# Patient Record
Sex: MALE | Race: WHITE | NOT HISPANIC OR LATINO
[De-identification: names, ages, dates, MRNs, and addresses within clinical notes are randomized per-mention and may not be internally consistent; named-entity substitution may affect disease eponyms.]

---

## 2018-02-27 ENCOUNTER — APPOINTMENT (OUTPATIENT)
Dept: PULMONOLOGY | Facility: CLINIC | Age: 23
End: 2018-02-27
Payer: COMMERCIAL

## 2018-02-27 VITALS
HEART RATE: 66 BPM | HEIGHT: 69 IN | SYSTOLIC BLOOD PRESSURE: 124 MMHG | DIASTOLIC BLOOD PRESSURE: 80 MMHG | OXYGEN SATURATION: 98 % | BODY MASS INDEX: 42.51 KG/M2 | WEIGHT: 287 LBS

## 2018-02-27 DIAGNOSIS — F41.9 ANXIETY DISORDER, UNSPECIFIED: ICD-10-CM

## 2018-02-27 DIAGNOSIS — Z82.61 FAMILY HISTORY OF ARTHRITIS: ICD-10-CM

## 2018-02-27 DIAGNOSIS — Z83.3 FAMILY HISTORY OF DIABETES MELLITUS: ICD-10-CM

## 2018-02-27 DIAGNOSIS — Z86.59 PERSONAL HISTORY OF OTHER MENTAL AND BEHAVIORAL DISORDERS: ICD-10-CM

## 2018-02-27 DIAGNOSIS — Z78.9 OTHER SPECIFIED HEALTH STATUS: ICD-10-CM

## 2018-02-27 DIAGNOSIS — Z83.49 FAMILY HISTORY OF OTHER ENDOCRINE, NUTRITIONAL AND METABOLIC DISEASES: ICD-10-CM

## 2018-02-27 DIAGNOSIS — Z82.49 FAMILY HISTORY OF ISCHEMIC HEART DISEASE AND OTHER DISEASES OF THE CIRCULATORY SYSTEM: ICD-10-CM

## 2018-02-27 DIAGNOSIS — Z86.14 PERSONAL HISTORY OF METHICILLIN RESISTANT STAPHYLOCOCCUS AUREUS INFECTION: ICD-10-CM

## 2018-02-27 PROCEDURE — 99204 OFFICE O/P NEW MOD 45 MIN: CPT | Mod: 25

## 2018-02-27 PROCEDURE — 94010 BREATHING CAPACITY TEST: CPT

## 2018-02-27 PROCEDURE — 71046 X-RAY EXAM CHEST 2 VIEWS: CPT

## 2018-02-27 RX ORDER — ESCITALOPRAM OXALATE 20 MG/1
20 TABLET, FILM COATED ORAL
Refills: 0 | Status: ACTIVE | COMMUNITY

## 2018-05-01 ENCOUNTER — APPOINTMENT (OUTPATIENT)
Dept: PULMONOLOGY | Facility: CLINIC | Age: 23
End: 2018-05-01
Payer: COMMERCIAL

## 2018-05-01 VITALS
SYSTOLIC BLOOD PRESSURE: 124 MMHG | HEIGHT: 69 IN | WEIGHT: 290 LBS | OXYGEN SATURATION: 98 % | DIASTOLIC BLOOD PRESSURE: 80 MMHG | HEART RATE: 60 BPM | BODY MASS INDEX: 42.95 KG/M2

## 2018-05-01 PROCEDURE — 99214 OFFICE O/P EST MOD 30 MIN: CPT | Mod: 25

## 2018-05-01 PROCEDURE — 94010 BREATHING CAPACITY TEST: CPT

## 2018-08-28 ENCOUNTER — APPOINTMENT (OUTPATIENT)
Dept: PULMONOLOGY | Facility: CLINIC | Age: 23
End: 2018-08-28
Payer: COMMERCIAL

## 2018-08-28 ENCOUNTER — NON-APPOINTMENT (OUTPATIENT)
Age: 23
End: 2018-08-28

## 2018-08-28 VITALS
OXYGEN SATURATION: 98 % | SYSTOLIC BLOOD PRESSURE: 130 MMHG | WEIGHT: 300 LBS | HEART RATE: 85 BPM | HEIGHT: 69 IN | DIASTOLIC BLOOD PRESSURE: 88 MMHG | RESPIRATION RATE: 14 BRPM | BODY MASS INDEX: 44.43 KG/M2

## 2018-08-28 PROCEDURE — 99214 OFFICE O/P EST MOD 30 MIN: CPT | Mod: 25

## 2018-08-28 PROCEDURE — 94010 BREATHING CAPACITY TEST: CPT

## 2018-09-19 ENCOUNTER — RX RENEWAL (OUTPATIENT)
Age: 23
End: 2018-09-19

## 2018-10-15 ENCOUNTER — MEDICATION RENEWAL (OUTPATIENT)
Age: 23
End: 2018-10-15

## 2018-11-03 ENCOUNTER — RX RENEWAL (OUTPATIENT)
Age: 23
End: 2018-11-03

## 2018-11-05 ENCOUNTER — APPOINTMENT (OUTPATIENT)
Dept: FAMILY MEDICINE | Facility: CLINIC | Age: 23
End: 2018-11-05
Payer: COMMERCIAL

## 2018-11-05 ENCOUNTER — TRANSCRIPTION ENCOUNTER (OUTPATIENT)
Age: 23
End: 2018-11-05

## 2018-11-05 VITALS
HEIGHT: 69 IN | BODY MASS INDEX: 46.06 KG/M2 | DIASTOLIC BLOOD PRESSURE: 80 MMHG | RESPIRATION RATE: 16 BRPM | HEART RATE: 84 BPM | OXYGEN SATURATION: 98 % | SYSTOLIC BLOOD PRESSURE: 130 MMHG | WEIGHT: 311 LBS

## 2018-11-05 DIAGNOSIS — F95.2 TOURETTE'S DISORDER: ICD-10-CM

## 2018-11-05 PROCEDURE — 36415 COLL VENOUS BLD VENIPUNCTURE: CPT

## 2018-11-05 PROCEDURE — 99385 PREV VISIT NEW AGE 18-39: CPT | Mod: 25

## 2018-11-05 RX ORDER — MODAFINIL 200 MG/1
200 TABLET ORAL
Qty: 30 | Refills: 5 | Status: DISCONTINUED | COMMUNITY
Start: 2018-05-01 | End: 2018-11-05

## 2018-11-06 ENCOUNTER — APPOINTMENT (OUTPATIENT)
Dept: PULMONOLOGY | Facility: CLINIC | Age: 23
End: 2018-11-06
Payer: COMMERCIAL

## 2018-11-06 ENCOUNTER — NON-APPOINTMENT (OUTPATIENT)
Age: 23
End: 2018-11-06

## 2018-11-06 VITALS
SYSTOLIC BLOOD PRESSURE: 120 MMHG | WEIGHT: 304 LBS | DIASTOLIC BLOOD PRESSURE: 83 MMHG | BODY MASS INDEX: 43.52 KG/M2 | OXYGEN SATURATION: 98 % | RESPIRATION RATE: 17 BRPM | HEART RATE: 86 BPM | HEIGHT: 70 IN

## 2018-11-06 PROCEDURE — 99214 OFFICE O/P EST MOD 30 MIN: CPT | Mod: 25

## 2018-11-06 PROCEDURE — 94010 BREATHING CAPACITY TEST: CPT

## 2018-11-06 PROCEDURE — 71046 X-RAY EXAM CHEST 2 VIEWS: CPT

## 2018-11-06 NOTE — REASON FOR VISIT
[Follow-Up] : a follow-up visit [FreeTextEntry1] : abnormal PFTs, low testosterone STEPHANIE on CPAP, overweight, poor sleep, RLS and SOB

## 2018-11-06 NOTE — HISTORY OF PRESENT ILLNESS
[FreeTextEntry1] : Mr. Mathur is a 23 year old male with a history of abnormal PFTs, low testosterone STEPHANIE on CPAP, overweight, poor sleep, RLS and SOB presenting to the office today for a follow up visit. His chief complaint is poor sleep\par - He has been experimenting with his Modinafil. Since it would last too long, he has been waking up at 5 AM, forcing himself to take 200 mg, and going back to sleep. He notes that this has been helping. \par - He has been getting to sleep easier since taking Melatonin, but he has more difficulty remaining asleep. \par - he is up for at least once an hour. \par - Most of the time, he wakes up from rolling over \par - He has been SOB. \par - He has been having reflux \par - Coughing. He has been coughing leading to emesis. \par - He has a frequent feeling of a lump in his throat he needs to clear.\par - He has been feeling congested recently. \par - He denies any chest pressure, chest pain, wheezing

## 2018-11-06 NOTE — PROCEDURE
[FreeTextEntry1] : CXR revealed a normal sized heart; there was no evidence of infiltrate or effusion-- A normal chest radiograph. \par \par PFT- spi reveals mild restrictive dysfunction; FEV1 was 3.61L which is 78% of predicted; normal flow volume loop

## 2018-11-06 NOTE — ADDENDUM
[FreeTextEntry1] : Documented by Oracio Haile acting as a scribe for Dr. Brant Marin on 11/6/18\par \par All medical record entries made by the Scribe were at my, Dr. Brant Marin's, direction and personally dictated by me on 11/6/18. I have reviewed the chart and agree that the record accurately reflects my personal performance of the history, physical exam, assessment and plan. I have also personally directed, reviewed, and agree with the discharge instructions. \par \par \par \par \par

## 2018-11-06 NOTE — ASSESSMENT
[FreeTextEntry1] : Mr. Mathur is a 23 year old male with a history of SOB, GERD, poor sleep, Tourette's syndrome, ADD, sleep apnea, ?RLS, insomnia, obesity, OCD, and anxiety presenting to the office for an initial pulmonary re-evaluation.\par \par His shortness of breath is multifactorial due to:\par -obesity/out of shape\par -poor breathing mechanics\par Less likely\par -?CAD\par -?asthma\par \par problem 1: STEPHANIE\par -continue use of CPAP at pressure of 10, using religiously, tolerating it well, and seeing benefits. \par -continue of Modafinil 200 mg QAM \par -Sleep apnea is associated with adverse clinical consequences which an affect most organ systems.  Cardiovascular disease risk includes arrhythmias, atrial fibrillation, hypertension, coronary artery disease, and stroke. Metabolic disorders include diabetes type 2, non-alcoholic fatty liver disease. Mood disorder especially depression; and cognitive decline especially in the elderly. Associations with  chronic reflux/Leahy’s esophagus some but not all inclusive. \par -Reasons  include arousal consistent with hypopnea; respiratory events most prominent in REM sleep or supine position; therefore sleep staging and body position are important for accurate diagnosis and estimation of AHI. \par \par problem 2: insomnia/poor sleep\par -recommended to use Sleep Guard\par -recommended to use sunglasses 30 minutes before bedtime and to try room darkening window shades\par -Good sleep hygiene was encouraged including avoiding watching television an hour before bed, keeping caffeine at a low,  avoiding reading, television, or anything, in bed, no drinking any liquids three hours before bedtime, and only getting into bed when tired and ready for sleep. \par \par problem 3: RLS\par - He is s/p blood work, which revealed: ferratin level (normal), iron binding level (normal), testosterone level (low), TFT (normal) and vitamin D level (normal)\par - Continue Requip 1.0 mg QHS \par -Restless Legs Syndrome (RLS), also known as Stewart-Ekbom Disease, is a common sleep -related movement disorder. About 1 in 10 adults in the [place] have problems from restless leg syndrome. It also can be seen in about 2% of children. Women are twice as likely as men to have RLS. People with RLS will have symptoms  most often during times when they are less active, especially at bedtime. RLS most often causes an overwhelming urge to move your legs and sometimes other parts of your body. This urge is associated with unpleasant sensations in different parts of th body. The symptoms can be mild to severe and can affect your ability to go to sleep and stay asleep. People with RLS often sleep less at night and feel more tired during the day. \par \par problem 4: abnormal PFTs- ?Asthma \par -confirms sleep apnea\par - He is being given a script for an MCT to r/o for asthma\par - Add Breo 200 1 inhalation QD\par \par problem 5: residual fatigue\par -continue Modafinil 200 mg QAM (5AM)\par \par Problem 6: GERD\par - Continue Prilosec before breakfast\par - Add Zantac 300 mg QHS\par - He is scheduled to have an endoscopy performed at [facility] \par \par problem 7: low testosterone\par -recommended supplemental Boron 6mg QD\par -Referred to endocrinologist for further evaluation \par \par problem 8: poor breathing mechanics\par -Proper breathing techniques were reviewed with an emphasis of exhalation. Patient instructed to breath in for 1 second and out for four seconds. Patient was encouraged to not talk while walking. \par \par problem 9: obesity/out of shape\par -recommended to increase protein and decrease carbohydrates\par -recommended to reduce caffeine and increase water intake\par -Weight loss, exercise, and diet control were discussed and are highly encouraged. Treatment options were given such as, aqua therapy, and contacting a nutritionist. Recommended to use the elliptical, stationary bike, refrain from use of treadmill. Mindful eating was explained to the patient. Obesity is associated with worsening asthma, shortness of breath, and potential for cardiac disease, diabetes, and other underlying medical conditions.\par \par problem 10: health maintenance \par - He received an influenza vaccination 2018\par -recommended strep pneumonia vaccines: Prevnar-13 vaccine, followed by Pneumo vaccine 23 one year following\par -recommended early intervention for URIs\par -recommended regular osteoporosis evaluations\par -recommended early dermatological evaluations\par -recommended after the age of 50 to the age of 70, colonoscopy every 5 years \par  \par \par Follow up in 4 months \par Patient is encouraged to call with any changes, concerns or questions.

## 2018-11-07 LAB
ALBUMIN SERPL ELPH-MCNC: 4.3 G/DL
ALP BLD-CCNC: 81 U/L
ALT SERPL-CCNC: 60 U/L
ANION GAP SERPL CALC-SCNC: 13 MMOL/L
AST SERPL-CCNC: 26 U/L
BASOPHILS # BLD AUTO: 0.02 K/UL
BASOPHILS NFR BLD AUTO: 0.3 %
BILIRUB SERPL-MCNC: 0.5 MG/DL
BUN SERPL-MCNC: 6 MG/DL
CALCIUM SERPL-MCNC: 9.4 MG/DL
CHLORIDE SERPL-SCNC: 103 MMOL/L
CHOLEST SERPL-MCNC: 150 MG/DL
CHOLEST/HDLC SERPL: 3.9 RATIO
CO2 SERPL-SCNC: 24 MMOL/L
CREAT SERPL-MCNC: 0.6 MG/DL
EOSINOPHIL # BLD AUTO: 0.09 K/UL
EOSINOPHIL NFR BLD AUTO: 1.1 %
GLUCOSE SERPL-MCNC: 90 MG/DL
HBA1C MFR BLD HPLC: 5.3 %
HCT VFR BLD CALC: 48.6 %
HDLC SERPL-MCNC: 38 MG/DL
HGB BLD-MCNC: 14.8 G/DL
IMM GRANULOCYTES NFR BLD AUTO: 0.1 %
LDLC SERPL CALC-MCNC: 84 MG/DL
LYMPHOCYTES # BLD AUTO: 2.47 K/UL
LYMPHOCYTES NFR BLD AUTO: 31 %
MAN DIFF?: NORMAL
MCHC RBC-ENTMCNC: 28.5 PG
MCHC RBC-ENTMCNC: 30.5 GM/DL
MCV RBC AUTO: 93.5 FL
MONOCYTES # BLD AUTO: 0.78 K/UL
MONOCYTES NFR BLD AUTO: 9.8 %
NEUTROPHILS # BLD AUTO: 4.59 K/UL
NEUTROPHILS NFR BLD AUTO: 57.7 %
PLATELET # BLD AUTO: 245 K/UL
POTASSIUM SERPL-SCNC: 4.2 MMOL/L
PROT SERPL-MCNC: 7 G/DL
RBC # BLD: 5.2 M/UL
RBC # FLD: 13.4 %
SODIUM SERPL-SCNC: 140 MMOL/L
TRIGL SERPL-MCNC: 140 MG/DL
TSH SERPL-ACNC: 6.5 UIU/ML
WBC # FLD AUTO: 7.96 K/UL

## 2018-11-19 ENCOUNTER — MEDICATION RENEWAL (OUTPATIENT)
Age: 23
End: 2018-11-19

## 2018-11-28 ENCOUNTER — APPOINTMENT (OUTPATIENT)
Dept: PULMONOLOGY | Facility: CLINIC | Age: 23
End: 2018-11-28
Payer: COMMERCIAL

## 2018-11-28 PROCEDURE — 94070 EVALUATION OF WHEEZING: CPT

## 2018-11-28 PROCEDURE — 95070 INHLJ BRNCL CHALLENGE TSTG: CPT

## 2019-01-03 ENCOUNTER — NON-APPOINTMENT (OUTPATIENT)
Age: 24
End: 2019-01-03

## 2019-01-03 ENCOUNTER — APPOINTMENT (OUTPATIENT)
Dept: PULMONOLOGY | Facility: CLINIC | Age: 24
End: 2019-01-03
Payer: COMMERCIAL

## 2019-01-03 VITALS
HEIGHT: 71 IN | RESPIRATION RATE: 14 BRPM | HEART RATE: 77 BPM | SYSTOLIC BLOOD PRESSURE: 123 MMHG | BODY MASS INDEX: 43.12 KG/M2 | WEIGHT: 308 LBS | OXYGEN SATURATION: 97 % | DIASTOLIC BLOOD PRESSURE: 72 MMHG

## 2019-01-03 PROCEDURE — 94010 BREATHING CAPACITY TEST: CPT

## 2019-01-03 PROCEDURE — 99214 OFFICE O/P EST MOD 30 MIN: CPT | Mod: 25

## 2019-01-03 NOTE — REASON FOR VISIT
[Follow-Up] : a follow-up visit [FreeTextEntry1] : abnormal PFTs, GERD, low testosterone, STEPHANIE on CPAP, overweight, poor sleep, RLS, and SOB

## 2019-01-03 NOTE — HISTORY OF PRESENT ILLNESS
[FreeTextEntry1] : Mr. Mathur is a 23 year old male with a history of abnormal PFTs, GERD, low testosterone, STEPHANIE on CPAP, overweight, poor sleep, RLS, and SOB presenting to the office today for a follow up visit. His chief complaint is difficulty breathing / nasal congestion. \par -he states that he has been sick with a URI. he has been given doxycycline, promethazine and prednisone. \par -he reports that he has still been breathing heavily, and his sinuses are very congested \par -he states that he has had difficulty sleeping as well\par -he denies any headaches, nausea, vomiting, fever, chills, sweats, chest pain, chest pressure, diarrhea, constipation, dysphagia, dizziness, leg swelling, leg pain, itchy eyes, itchy ears, heartburn, reflux, or sour taste in the mouth.

## 2019-01-03 NOTE — PHYSICAL EXAM

## 2019-01-03 NOTE — ADDENDUM
[FreeTextEntry1] : All medical record entries made by tomas Ashford were at Dr. Brant Marin's, direction and personally dictated by me on 01/03/2019. I have reviewed the chart and agree that the record accurately reflects my personal performance of the history, physical exam, assessment and plan. I have also personally directed, reviewed, and agree with the discharge instructions.

## 2019-01-03 NOTE — ASSESSMENT
[FreeTextEntry1] : Mr. Mathur is a 23 year old male with a history of SOB, GERD, poor sleep, Tourette's syndrome, ADD, sleep apnea, ?RLS, insomnia, obesity, OCD, and anxiety presenting to the office for an initial pulmonary re-evaluation.\par \par His shortness of breath is multifactorial due to:\par -obesity/out of shape\par -poor breathing mechanics\par Less likely\par -?CAD\par -?asthma\par \par problem 1: STEPHANIE\par -continue use of CPAP at pressure of 10, using religiously, tolerating it well, and seeing benefits. \par -continue of Modafinil 200 mg QAM \par -Sleep apnea is associated with adverse clinical consequences which an affect most organ systems.  Cardiovascular disease risk includes arrhythmias, atrial fibrillation, hypertension, coronary artery disease, and stroke. Metabolic disorders include diabetes type 2, non-alcoholic fatty liver disease. Mood disorder especially depression; and cognitive decline especially in the elderly. Associations with  chronic reflux/Leahy’s esophagus some but not all inclusive. \par -Reasons  include arousal consistent with hypopnea; respiratory events most prominent in REM sleep or supine position; therefore sleep staging and body position are important for accurate diagnosis and estimation of AHI. \par \par problem 2: insomnia/poor sleep\par -recommended to use Sleep Guard\par -recommended to use sunglasses 30 minutes before bedtime and to try room darkening window shades\par -Good sleep hygiene was encouraged including avoiding watching television an hour before bed, keeping caffeine at a low,  avoiding reading, television, or anything, in bed, no drinking any liquids three hours before bedtime, and only getting into bed when tired and ready for sleep. \par \par problem 3: RLS\par - He is s/p blood work, which revealed: ferratin level (normal), iron binding level (normal), testosterone level (low), TFT (normal) and vitamin D level (normal)\par - Continue Requip 1.0 mg QHS \par -Restless Legs Syndrome (RLS), also known as Stewart-Ekbom Disease, is a common sleep -related movement disorder. About 1 in 10 adults in the [place] have problems from restless leg syndrome. It also can be seen in about 2% of children. Women are twice as likely as men to have RLS. People with RLS will have symptoms  most often during times when they are less active, especially at bedtime. RLS most often causes an overwhelming urge to move your legs and sometimes other parts of your body. This urge is associated with unpleasant sensations in different parts of th body. The symptoms can be mild to severe and can affect your ability to go to sleep and stay asleep. People with RLS often sleep less at night and feel more tired during the day. \par \par problem 4: abnormal PFTs-  c/w Asthma \par -MCT c/w asthma\par -confirms sleep apnea\par -continue Breo 200 1 inhalation QD\par -add Ventolin 2 puffs Q6H \par -add Singulair 10 mg QHS \par \par problem 5: allergy sinus \par -add Olopatadine 0.6% 1 sniff BID \par Environmental measures for allergies were encouraged including mattress and pillow cover, air purifier, and environmental controls \par \par problem 6: residual fatigue\par -continue Modafinil 200 mg QAM (5AM)\par \par Problem 7: GERD\par - Continue Prilosec before breakfast\par - continue Zantac 300 mg QHS\par - He is scheduled to have an endoscopy performed at [facility] \par \par Things to avoid including overeating, spicy foods, tight clothing, eating within three hours of bed, this list is not all inclusive. \par -Rule of 2s: avoid eating too much, eating too late, eating too spicy, eating two hours before bed\par -For treatment of reflux, possible options discussed including diet control, H2 blockers, PPIs, as well as coating motility agents discussed as treatment options. Timing of meals and proximity of last meal to sleep were discussed. If symptoms persist, a formal gastrointestinal evaluation is needed. \par \par problem 8: low testosterone\par -recommended supplemental Boron 6mg QD\par -Referred to endocrinologist for further evaluation \par \par problem 9: poor breathing mechanics\par -Proper breathing techniques were reviewed with an emphasis of exhalation. Patient instructed to breath in for 1 second and out for four seconds. Patient was encouraged to not talk while walking. \par \par problem 10: obesity/out of shape\par -recommended to increase protein and decrease carbohydrates\par -recommended to reduce caffeine and increase water intake\par -Weight loss, exercise, and diet control were discussed and are highly encouraged. Treatment options were given such as, aqua therapy, and contacting a nutritionist. Recommended to use the elliptical, stationary bike, refrain from use of treadmill. Mindful eating was explained to the patient. Obesity is associated with worsening asthma, shortness of breath, and potential for cardiac disease, diabetes, and other underlying medical conditions.\par \par problem 11: health maintenance \par - He received an influenza vaccination 2018\par -recommended strep pneumonia vaccines: Prevnar-13 vaccine, followed by Pneumo vaccine 23 one year following\par -recommended early intervention for URIs\par -recommended regular osteoporosis evaluations\par -recommended early dermatological evaluations\par -recommended after the age of 50 to the age of 70, colonoscopy every 5 years \par  \par \par Follow up in 4 months \par Patient is encouraged to call with any changes, concerns or questions.

## 2019-01-03 NOTE — PROCEDURE
[FreeTextEntry1] : PFT - spi reveals mild obstructive dysfunction; FEV1 is 3.76 which is 80% of predicted, normal flow volume loop

## 2019-01-07 ENCOUNTER — RX RENEWAL (OUTPATIENT)
Age: 24
End: 2019-01-07

## 2019-03-21 ENCOUNTER — RX RENEWAL (OUTPATIENT)
Age: 24
End: 2019-03-21

## 2019-03-21 ENCOUNTER — TRANSCRIPTION ENCOUNTER (OUTPATIENT)
Age: 24
End: 2019-03-21

## 2019-04-10 ENCOUNTER — APPOINTMENT (OUTPATIENT)
Dept: PULMONOLOGY | Facility: CLINIC | Age: 24
End: 2019-04-10
Payer: COMMERCIAL

## 2019-04-10 ENCOUNTER — NON-APPOINTMENT (OUTPATIENT)
Age: 24
End: 2019-04-10

## 2019-04-10 VITALS
DIASTOLIC BLOOD PRESSURE: 70 MMHG | HEART RATE: 92 BPM | BODY MASS INDEX: 46.65 KG/M2 | RESPIRATION RATE: 16 BRPM | WEIGHT: 315 LBS | SYSTOLIC BLOOD PRESSURE: 110 MMHG | OXYGEN SATURATION: 97 % | HEIGHT: 69 IN

## 2019-04-10 PROCEDURE — 99214 OFFICE O/P EST MOD 30 MIN: CPT | Mod: 25

## 2019-04-10 PROCEDURE — 94010 BREATHING CAPACITY TEST: CPT

## 2019-04-10 NOTE — ADDENDUM
[FreeTextEntry1] : Documented by Trang Hammonds acting as a scribe for Dr. Brant Marin on 4/10/2019.\par \par All medical record entries made by the scribe, Trang Hammonds, were at my, Dr. Brant Marin's, direction and personally dictated by me on 4/10/2019. I have reviewed the chart and agree that the record accurately reflects my personal performance of the history, physical exam, assessment and plan. I have also personally directed, reviewed, and agree with the discharge instructions.

## 2019-04-10 NOTE — ASSESSMENT
[FreeTextEntry1] : Mr. Mathur is a 24 year old male with a history of SOB, GERD, poor sleep, Tourette's syndrome, ADD, sleep apnea, ?RLS, insomnia, obesity, OCD, and anxiety presenting to the office for an initial pulmonary re-evaluation - residual fatigue. \par \par His shortness of breath is multifactorial due to:\par -obesity/out of shape\par -poor breathing mechanics\par Less likely\par -?CAD\par -?asthma\par \par problem 1: STEPHANIE\par -continue use of CPAP at pressure of 10, using religiously, tolerating it well, and seeing benefits. \par -continue of Modafinil 200 mg QAM \par -Sleep apnea is associated with adverse clinical consequences which an affect most organ systems.  Cardiovascular disease risk includes arrhythmias, atrial fibrillation, hypertension, coronary artery disease, and stroke. Metabolic disorders include diabetes type 2, non-alcoholic fatty liver disease. Mood disorder especially depression; and cognitive decline especially in the elderly. Associations with  chronic reflux/Leahy’s esophagus some but not all inclusive. \par -Reasons  include arousal consistent with hypopnea; respiratory events most prominent in REM sleep or supine position; therefore sleep staging and body position are important for accurate diagnosis and estimation of AHI. \par \par problem 2: insomnia/poor sleep\par -recommended to use Sleep Guard\par -recommended to use sunglasses 30 minutes before bedtime and to try room darkening window shades\par -Good sleep hygiene was encouraged including avoiding watching television an hour before bed, keeping caffeine at a low,  avoiding reading, television, or anything, in bed, no drinking any liquids three hours before bedtime, and only getting into bed when tired and ready for sleep. \par \par problem 3: RLS\par - He is s/p blood work, which revealed: ferratin level (normal), iron binding level (normal), testosterone level (low), TFT (normal) and vitamin D level (normal)\par - Continue Requip 2.0 mg QHS \par -Restless Legs Syndrome (RLS), also known as Stewart-Ekbom Disease, is a common sleep -related movement disorder. About 1 in 10 adults in the [place] have problems from restless leg syndrome. It also can be seen in about 2% of children. Women are twice as likely as men to have RLS. People with RLS will have symptoms  most often during times when they are less active, especially at bedtime. RLS most often causes an overwhelming urge to move your legs and sometimes other parts of your body. This urge is associated with unpleasant sensations in different parts of th body. The symptoms can be mild to severe and can affect your ability to go to sleep and stay asleep. People with RLS often sleep less at night and feel more tired during the day. \par \par problem 4: abnormal PFTs-  c/w Asthma \par -MCT c/w asthma\par -confirms sleep apnea - inspiratory limb flattened\par -continue Breo 200 1 inhalation QD\par -add Ventolin 2 puffs Q6H \par -add Singulair 10 mg QHS \par \par problem 5: allergy sinus \par -add Olopatadine 0.6% 1 sniff BID \par Environmental measures for allergies were encouraged including mattress and pillow cover, air purifier, and environmental controls \par \par problem 6: residual fatigue\par -continue Modafinil 200 mg QAM (5AM) - I-STOP reviewed\par \par Problem 7: GERD\par - Continue Prilosec before breakfast\par - continue Zantac 300 mg QHS\par - Marmaduke Feldman - c/w mild GERD/HH \par \par Things to avoid including overeating, spicy foods, tight clothing, eating within three hours of bed, this list is not all inclusive. \par -Rule of 2s: avoid eating too much, eating too late, eating too spicy, eating two hours before bed\par -For treatment of reflux, possible options discussed including diet control, H2 blockers, PPIs, as well as coating motility agents discussed as treatment options. Timing of meals and proximity of last meal to sleep were discussed. If symptoms persist, a formal gastrointestinal evaluation is needed. \par \par problem 8: low testosterone\par -recommended supplemental Boron 6mg QD\par -Referred to endocrinologist for further evaluation \par \par problem 9: poor breathing mechanics\par -Proper breathing techniques were reviewed with an emphasis of exhalation. Patient instructed to breath in for 1 second and out for four seconds. Patient was encouraged to not talk while walking. \par \par problem 10: obesity/out of shape\par -recommended to increase protein and decrease carbohydrates\par -recommended to reduce caffeine and increase water intake\par -Weight loss, exercise, and diet control were discussed and are highly encouraged. Treatment options were given such as, aqua therapy, and contacting a nutritionist. Recommended to use the elliptical, stationary bike, refrain from use of treadmill. Mindful eating was explained to the patient. Obesity is associated with worsening asthma, shortness of breath, and potential for cardiac disease, diabetes, and other underlying medical conditions.\par \par problem 11: health maintenance \par - He received an influenza vaccination 2018\par -recommended strep pneumonia vaccines: Prevnar-13 vaccine, followed by Pneumo vaccine 23 one year following\par -recommended early intervention for URIs\par -recommended regular osteoporosis evaluations\par -recommended early dermatological evaluations\par -recommended after the age of 50 to the age of 70, colonoscopy every 5 years \par  \par \par Follow up in 4 months \par Patient is encouraged to call with any changes, concerns or questions.

## 2019-04-10 NOTE — PROCEDURE
[FreeTextEntry1] : PFT - spi reveals mild obstructive dysfunction; FEV1 is 4.22 which is 95% of predicted, normal flow volume loop

## 2019-04-10 NOTE — HISTORY OF PRESENT ILLNESS
[FreeTextEntry1] : Mr. Mathur is a 24 year old male with a history of abnormal PFTs, GERD, low testosterone, STEPHANIE on CPAP, overweight, poor sleep, RLS, and SOB presenting to the office today for a follow up visit. His chief complaint is fatigue. \par -he notes the past three days he felt noticeably exhausted. \par -he notes he has to sleep 12 hours each night, if he sleeps less he feels groggy and unable to concentrate. \par -he notes he uses the CPAP every night. \par -he reports that he has still been breathing heavily, and his sinuses are very congested \par -he states that he continues to have difficulty falling sleeping. \par -he denies any headaches, nausea, vomiting, fever, chills, sweats, chest pain, chest pressure, diarrhea, constipation, dysphagia, dizziness, leg swelling, leg pain, itchy eyes, itchy ears, heartburn, reflux, or sour taste in the mouth.

## 2019-05-07 ENCOUNTER — RX RENEWAL (OUTPATIENT)
Age: 24
End: 2019-05-07

## 2019-05-07 ENCOUNTER — APPOINTMENT (OUTPATIENT)
Dept: FAMILY MEDICINE | Facility: CLINIC | Age: 24
End: 2019-05-07
Payer: COMMERCIAL

## 2019-05-07 VITALS
RESPIRATION RATE: 18 BRPM | WEIGHT: 310 LBS | HEART RATE: 94 BPM | SYSTOLIC BLOOD PRESSURE: 120 MMHG | BODY MASS INDEX: 44.38 KG/M2 | DIASTOLIC BLOOD PRESSURE: 70 MMHG | HEIGHT: 70 IN | OXYGEN SATURATION: 97 %

## 2019-05-07 PROCEDURE — 36415 COLL VENOUS BLD VENIPUNCTURE: CPT

## 2019-05-07 PROCEDURE — 99213 OFFICE O/P EST LOW 20 MIN: CPT | Mod: 25

## 2019-05-08 LAB
MEV IGG FLD QL IA: 168 AU/ML
MEV IGG+IGM SER-IMP: POSITIVE
TESTOST SERPL-MCNC: 158 NG/DL
TSH SERPL-ACNC: 4.44 UIU/ML

## 2019-06-04 ENCOUNTER — MEDICATION RENEWAL (OUTPATIENT)
Age: 24
End: 2019-06-04

## 2019-06-21 ENCOUNTER — RX RENEWAL (OUTPATIENT)
Age: 24
End: 2019-06-21

## 2019-07-14 ENCOUNTER — RX RENEWAL (OUTPATIENT)
Age: 24
End: 2019-07-14

## 2019-08-01 ENCOUNTER — APPOINTMENT (OUTPATIENT)
Dept: FAMILY MEDICINE | Facility: CLINIC | Age: 24
End: 2019-08-01
Payer: COMMERCIAL

## 2019-08-01 VITALS
OXYGEN SATURATION: 98 % | HEIGHT: 70 IN | SYSTOLIC BLOOD PRESSURE: 118 MMHG | HEART RATE: 108 BPM | WEIGHT: 311 LBS | RESPIRATION RATE: 18 BRPM | DIASTOLIC BLOOD PRESSURE: 80 MMHG | BODY MASS INDEX: 44.52 KG/M2

## 2019-08-01 PROCEDURE — 99213 OFFICE O/P EST LOW 20 MIN: CPT | Mod: 25

## 2019-08-01 PROCEDURE — 36415 COLL VENOUS BLD VENIPUNCTURE: CPT

## 2019-08-02 ENCOUNTER — APPOINTMENT (OUTPATIENT)
Dept: PULMONOLOGY | Facility: CLINIC | Age: 24
End: 2019-08-02
Payer: COMMERCIAL

## 2019-08-02 ENCOUNTER — NON-APPOINTMENT (OUTPATIENT)
Age: 24
End: 2019-08-02

## 2019-08-02 VITALS
HEART RATE: 83 BPM | OXYGEN SATURATION: 98 % | DIASTOLIC BLOOD PRESSURE: 80 MMHG | WEIGHT: 311 LBS | BODY MASS INDEX: 44.52 KG/M2 | RESPIRATION RATE: 17 BRPM | HEIGHT: 70 IN | SYSTOLIC BLOOD PRESSURE: 120 MMHG

## 2019-08-02 LAB — TSH SERPL-ACNC: 2.27 UIU/ML

## 2019-08-02 PROCEDURE — 99214 OFFICE O/P EST MOD 30 MIN: CPT | Mod: 25

## 2019-08-02 PROCEDURE — 94010 BREATHING CAPACITY TEST: CPT

## 2019-08-02 NOTE — ASSESSMENT
[FreeTextEntry1] : Mr. Mathur is a 24 year old male with a history of SOB, GERD, poor sleep, Tourette's syndrome, asthma, allergy, ADD, sleep apnea, ?RLS, insomnia, obesity, OCD, and anxiety presenting to the office for a follow up pulmonary re-evaluation - residual fatigue. \par \par His shortness of breath is multifactorial due to:\par -obesity/out of shape\par -poor breathing mechanics\par Less likely\par -?CAD\par -?asthma\par \par problem 1: STEPHANIE\par -continue use of CPAP at pressure of 10, using religiously, tolerating it well, and seeing benefits. \par -continue of Modafinil 200 mg QAM \par -Sleep apnea is associated with adverse clinical consequences which an affect most organ systems.  Cardiovascular disease risk includes arrhythmias, atrial fibrillation, hypertension, coronary artery disease, and stroke. Metabolic disorders include diabetes type 2, non-alcoholic fatty liver disease. Mood disorder especially depression; and cognitive decline especially in the elderly. Associations with  chronic reflux/Leahy’s esophagus some but not all inclusive. \par -Reasons  include arousal consistent with hypopnea; respiratory events most prominent in REM sleep or supine position; therefore sleep staging and body position are important for accurate diagnosis and estimation of AHI. \par \par problem 2: insomnia/poor sleep\par -Recommended to use Insomnitol (take 1st)\par -recommended to use Sleep Guard (2nd, if he wakes up)\par -Recommended to take Requip (3rd, if he wakes up again)\par -recommended to use sunglasses 30 minutes before bedtime and to try room darkening window shades\par -Good sleep hygiene was encouraged including avoiding watching television an hour before bed, keeping caffeine at a low,  avoiding reading, television, or anything, in bed, no drinking any liquids three hours before bedtime, and only getting into bed when tired and ready for sleep. \par \par problem 3: RLS\par - He is s/p blood work, which revealed: ferratin level (normal), iron binding level (normal), testosterone level (low), TFT (normal) and vitamin D level (normal)\par - Continue Requip 2.0 mg QHS \par -Restless Legs Syndrome (RLS), also known as Stewart-Ekbom Disease, is a common sleep -related movement disorder. About 1 in 10 adults in the [place] have problems from restless leg syndrome. It also can be seen in about 2% of children. Women are twice as likely as men to have RLS. People with RLS will have symptoms  most often during times when they are less active, especially at bedtime. RLS most often causes an overwhelming urge to move your legs and sometimes other parts of your body. This urge is associated with unpleasant sensations in different parts of th body. The symptoms can be mild to severe and can affect your ability to go to sleep and stay asleep. People with RLS often sleep less at night and feel more tired during the day. \par \par problem 4: abnormal PFTs-  c/w Asthma \par -MCT c/w asthma\par -confirms sleep apnea - inspiratory limb flattened\par -continue Breo 200 1 inhalation QD\par -continue Ventolin 2 puffs Q6H \par -continue Singulair 10 mg QHS \par \par problem 5: allergy sinus \par -continue Olopatadine 0.6% 1 sniff BID \par Environmental measures for allergies were encouraged including mattress and pillow cover, air purifier, and environmental controls \par \par problem 6: residual fatigue\par -continue Modafinil 200 mg QAM (5AM) - I-STOP reviewed\par \par Problem 7: GERD\par - Continue Prilosec before breakfast\par - continue Zantac 300 mg QHS\par - [place] Feldman - c/w mild GERD/HH \par \par Things to avoid including overeating, spicy foods, tight clothing, eating within three hours of bed, this list is not all inclusive. \par -Rule of 2s: avoid eating too much, eating too late, eating too spicy, eating two hours before bed\par -For treatment of reflux, possible options discussed including diet control, H2 blockers, PPIs, as well as coating motility agents discussed as treatment options. Timing of meals and proximity of last meal to sleep were discussed. If symptoms persist, a formal gastrointestinal evaluation is needed. \par \par problem 8: low testosterone\par -recommended supplemental Boron 6mg QD\par -Referred to endocrinologist for further evaluation \par \par problem 9: poor breathing mechanics\par -Proper breathing techniques were reviewed with an emphasis of exhalation. Patient instructed to breath in for 1 second and out for four seconds. Patient was encouraged to not talk while walking. \par \par problem 10: obesity/out of shape\par -recommended to increase protein and decrease carbohydrates\par -recommended to reduce caffeine and increase water intake\par -Weight loss, exercise, and diet control were discussed and are highly encouraged. Treatment options were given such as, aqua therapy, and contacting a nutritionist. Recommended to use the elliptical, stationary bike, refrain from use of treadmill. Mindful eating was explained to the patient. Obesity is associated with worsening asthma, shortness of breath, and potential for cardiac disease, diabetes, and other underlying medical conditions.\par \par problem 11: health maintenance \par - He received an influenza vaccination 2018\par -recommended strep pneumonia vaccines: Prevnar-13 vaccine, followed by Pneumo vaccine 23 one year following\par -recommended early intervention for URIs\par -recommended regular osteoporosis evaluations\par -recommended early dermatological evaluations\par -recommended after the age of 50 to the age of 70, colonoscopy every 5 years \par  \par \par Follow up in 4 months \par Patient is encouraged to call with any changes, concerns or questions.

## 2019-08-02 NOTE — ADDENDUM
[FreeTextEntry1] : Documented by Julio Henry acting as a scribe for Dr. Brant Marin on 08/02/2019.\par \par All medical record entries made by the Scribe were at my, Dr. Brant Marin's, direction and personally dictated by me on 08/02/2019. I have reviewed the chart and agree that the record accurately reflects my personal performance of the history, physical exam, assessment and plan. I have also personally directed, reviewed, and agree with the discharge instructions.

## 2019-08-02 NOTE — PROCEDURE
[FreeTextEntry1] : PFT revealed normal flows, with a FEV1 of 4.14L, which is 93% of predicted, with a normal flow volume loop

## 2019-08-02 NOTE — REVIEW OF SYSTEMS
[Dyspnea] : dyspnea [Dysphagia] : dysphagia [As Noted in HPI] : as noted in HPI [Difficulty Maintaining Sleep] : difficulty maintaining sleep [Negative] : Pulmonary Hypertension [Difficulty Initiating Sleep] : difficulty falling asleep [Cough] : no cough [Snoring] : no snoring

## 2019-08-02 NOTE — HISTORY OF PRESENT ILLNESS
[FreeTextEntry1] : Mr. Mathur is a 24 year old male with a history of abnormal PFTs, GERD, low testosterone, STEPHANIE on CPAP, overweight, poor sleep, RLS, and SOB presenting to the office today for a follow up visit. His chief complaint is poor sleep.\par -he notes he has been having difficulty sleeping, waking up 5 times nightly.  He reports he rolls over to try to return to sleep. He is unsure if he moves around while he sleeps\par -he notes rarely having dysphagia from eating too quickly\par -he reports having itchy ears\par -he notes he gets a sour / dry taste in his mouth\par -He notes His bowels are regular \par -he reports feeling SOB upon walking up stairs\par -he notes his vision is good\par -he reports he uses the CPAP well and doesn’t snore with it in place.  He notes he isnt able to sleep without the CPAP\par -he notes it takes some time for him to fall asleep, and even longer without using any medications\par -he reports he used Xanax a week ago, and he had the most restful sleep he has had for a long time\par -he reports his weight is stable\par -he denies any coughing, wheezing, chest pain, chest pressure, diarrhea, constipation, dizziness, leg swelling, leg pain, itchy eyes, heartburn, reflux

## 2019-08-02 NOTE — PHYSICAL EXAM
[General Appearance - Well Developed] : well developed [Normal Appearance] : normal appearance [Well Groomed] : well groomed [General Appearance - Well Nourished] : well nourished [No Deformities] : no deformities [General Appearance - In No Acute Distress] : no acute distress [Normal Conjunctiva] : the conjunctiva exhibited no abnormalities [Eyelids - No Xanthelasma] : the eyelids demonstrated no xanthelasmas [Normal Oropharynx] : normal oropharynx [Neck Appearance] : the appearance of the neck was normal [Neck Cervical Mass (___cm)] : no neck mass was observed [Jugular Venous Distention Increased] : there was no jugular-venous distention [Thyroid Diffuse Enlargement] : the thyroid was not enlarged [Thyroid Nodule] : there were no palpable thyroid nodules [Heart Rate And Rhythm] : heart rate and rhythm were normal [Heart Sounds] : normal S1 and S2 [Murmurs] : no murmurs present [Respiration, Rhythm And Depth] : normal respiratory rhythm and effort [Exaggerated Use Of Accessory Muscles For Inspiration] : no accessory muscle use [Auscultation Breath Sounds / Voice Sounds] : lungs were clear to auscultation bilaterally [Abdomen Soft] : soft [Abdomen Tenderness] : non-tender [Abdomen Mass (___ Cm)] : no abdominal mass palpated [Abnormal Walk] : normal gait [Gait - Sufficient For Exercise Testing] : the gait was sufficient for exercise testing [Nail Clubbing] : no clubbing of the fingernails [Cyanosis, Localized] : no localized cyanosis [Petechial Hemorrhages (___cm)] : no petechial hemorrhages [Skin Color & Pigmentation] : normal skin color and pigmentation [] : no rash [No Venous Stasis] : no venous stasis [Skin Lesions] : no skin lesions [No Skin Ulcers] : no skin ulcer [No Xanthoma] : no  xanthoma was observed [Deep Tendon Reflexes (DTR)] : deep tendon reflexes were 2+ and symmetric [Sensation] : the sensory exam was normal to light touch and pinprick [Oriented To Time, Place, And Person] : oriented to person, place, and time [No Focal Deficits] : no focal deficits [Impaired Insight] : insight and judgment were intact [Affect] : the affect was normal [III] : III [FreeTextEntry1] : I:E ratio 1:3; clear

## 2019-09-23 PROBLEM — Z01.818 PRE-OPERATIVE EXAMINATION: Status: ACTIVE | Noted: 2019-09-23

## 2019-09-24 ENCOUNTER — APPOINTMENT (OUTPATIENT)
Dept: SURGERY | Facility: CLINIC | Age: 24
End: 2019-09-24
Payer: COMMERCIAL

## 2019-09-24 VITALS
OXYGEN SATURATION: 99 % | TEMPERATURE: 98.7 F | SYSTOLIC BLOOD PRESSURE: 137 MMHG | BODY MASS INDEX: 44.09 KG/M2 | WEIGHT: 308 LBS | RESPIRATION RATE: 17 BRPM | HEART RATE: 90 BPM | DIASTOLIC BLOOD PRESSURE: 81 MMHG | HEIGHT: 70 IN

## 2019-09-24 DIAGNOSIS — Z01.818 ENCOUNTER FOR OTHER PREPROCEDURAL EXAMINATION: ICD-10-CM

## 2019-09-24 DIAGNOSIS — Z87.19 PERSONAL HISTORY OF OTHER DISEASES OF THE DIGESTIVE SYSTEM: ICD-10-CM

## 2019-09-24 DIAGNOSIS — Z78.9 OTHER SPECIFIED HEALTH STATUS: ICD-10-CM

## 2019-09-24 PROCEDURE — 99245 OFF/OP CONSLTJ NEW/EST HI 55: CPT

## 2019-09-24 RX ORDER — ROPINIROLE 1 MG/1
1 TABLET, FILM COATED ORAL
Qty: 90 | Refills: 1 | Status: DISCONTINUED | COMMUNITY
Start: 2019-06-21 | End: 2019-09-24

## 2019-09-24 RX ORDER — ROPINIROLE 0.5 MG/1
0.5 TABLET, FILM COATED ORAL
Qty: 30 | Refills: 5 | Status: DISCONTINUED | COMMUNITY
Start: 2018-02-27 | End: 2019-09-24

## 2019-09-24 NOTE — HISTORY OF PRESENT ILLNESS
[de-identified] : The patient is a 24 year-old morbidly obese man, 5 feet 10 inches 308 pounds (BMI= 44).  The patient presents requesting weight loss surgery.  He has been more than 100 lb. overweight for the past 5 years and is currently [at] his greatest weight. \par \par The patient has tried numerous weight loss programs including Weight Watchers, and self-directed and physician supervised diets. Patient has not taken weight loss medication due to known side effects. Patient has lost up to 60 pounds on more than one occasion.\par \par The patient denies diabetes, and shortness of breath with exertion, but has weight bearing joint pain, and low back pain.  He denies kidney, urinary tract disease, headaches, dizziness, seizure or neurological disorder other than Tourette syndrome.  He denies untreated thyroid, adrenal, pituitary disease, depression or psychiatric disorder.  The patient denies gallstones, heartburn, ulcer or liver disease.  He denies high blood pressure, high cholesterol, heart attack or stroke.  He denies anemia, bleeding disorder, thrombosis, clotting disorder or easy bruisability.  He denies peripheral edema and has been treated for obstructive sleep apnea.  He denies erectile dysfunction.\par \par  [de-identified] : Lisandro is a 24 year old male here for consultation for weight loss surgery.\par

## 2019-09-24 NOTE — ASSESSMENT
[FreeTextEntry1] : The patient is a morbidly obese man, (BMI= 44), with significant weight related comorbidity including: Obstructive sleep apnea, weightbearing joint pain, low back pain; unable to lose weight and improve his co-morbid conditions with medical management including diet, exercise and weight loss medication.

## 2019-09-24 NOTE — CONSULT LETTER
[Dear  ___] : Dear  [unfilled], [Consult Letter:] : I had the pleasure of evaluating your patient, [unfilled]. [Please see my note below.] : Please see my note below. [Consult Closing:] : Thank you very much for allowing me to participate in the care of this patient.  If you have any questions, please do not hesitate to contact me. [Sincerely,] : Sincerely, [FreeTextEntry3] : Pérez Gambino MD, FACS, FASMBS\par , Department of Surgery [facility]\par Director of Metabolic and Bariatric Surgery, and Robotic Minimally Invasive Surgery at [facility] [DrJosefina  ___] : Dr. BULL

## 2019-09-24 NOTE — PHYSICAL EXAM
[Obese, well nourished, in no acute distress] : obese, well nourished, in no acute distress [Normal] : grossly intact [de-identified] : Normoactive bowel sounds, no hepatosplenomegaly, no masses, non-tender.

## 2019-10-09 ENCOUNTER — APPOINTMENT (OUTPATIENT)
Dept: FAMILY MEDICINE | Facility: CLINIC | Age: 24
End: 2019-10-09
Payer: COMMERCIAL

## 2019-10-09 VITALS
WEIGHT: 308 LBS | OXYGEN SATURATION: 97 % | DIASTOLIC BLOOD PRESSURE: 84 MMHG | RESPIRATION RATE: 17 BRPM | SYSTOLIC BLOOD PRESSURE: 122 MMHG | HEART RATE: 84 BPM | BODY MASS INDEX: 44.09 KG/M2 | HEIGHT: 70 IN

## 2019-10-09 DIAGNOSIS — Z23 ENCOUNTER FOR IMMUNIZATION: ICD-10-CM

## 2019-10-09 PROCEDURE — G0008: CPT

## 2019-10-09 PROCEDURE — 90686 IIV4 VACC NO PRSV 0.5 ML IM: CPT

## 2019-10-09 PROCEDURE — 99213 OFFICE O/P EST LOW 20 MIN: CPT | Mod: 25

## 2019-10-09 NOTE — ASSESSMENT
[FreeTextEntry1] : Patient was given a vaccine and was counseled regarding all side affects. Patient was advised to ice the area if necessary if it is tender or red. Patient was told to return to office if has any fever, nausea, vomiting or increased pain.\par

## 2019-10-09 NOTE — HISTORY OF PRESENT ILLNESS
[FreeTextEntry8] : 24 year old male here for a flu shot. Patients active medications, allergies and issues were all reviewed with the patient at time of visit.\par

## 2019-10-09 NOTE — PHYSICAL EXAM
[Well Nourished] : well nourished [Normal Gait] : normal gait [Normal Insight/Judgement] : insight and judgment were intact [No Rash] : no rash

## 2019-10-31 ENCOUNTER — RX RENEWAL (OUTPATIENT)
Age: 24
End: 2019-10-31

## 2019-12-02 ENCOUNTER — RX RENEWAL (OUTPATIENT)
Age: 24
End: 2019-12-02

## 2019-12-04 ENCOUNTER — NON-APPOINTMENT (OUTPATIENT)
Age: 24
End: 2019-12-04

## 2019-12-04 ENCOUNTER — APPOINTMENT (OUTPATIENT)
Dept: PULMONOLOGY | Facility: CLINIC | Age: 24
End: 2019-12-04
Payer: COMMERCIAL

## 2019-12-04 VITALS
HEIGHT: 69 IN | HEART RATE: 86 BPM | BODY MASS INDEX: 44.14 KG/M2 | WEIGHT: 298 LBS | DIASTOLIC BLOOD PRESSURE: 60 MMHG | OXYGEN SATURATION: 99 % | SYSTOLIC BLOOD PRESSURE: 118 MMHG | RESPIRATION RATE: 17 BRPM

## 2019-12-04 PROCEDURE — 99214 OFFICE O/P EST MOD 30 MIN: CPT | Mod: 25

## 2019-12-04 PROCEDURE — 94010 BREATHING CAPACITY TEST: CPT

## 2019-12-04 NOTE — PHYSICAL EXAM
[General Appearance - Well Developed] : well developed [Normal Appearance] : normal appearance [General Appearance - Well Nourished] : well nourished [Well Groomed] : well groomed [Normal Conjunctiva] : the conjunctiva exhibited no abnormalities [No Deformities] : no deformities [General Appearance - In No Acute Distress] : no acute distress [Normal Oropharynx] : normal oropharynx [Eyelids - No Xanthelasma] : the eyelids demonstrated no xanthelasmas [III] : III [Neck Appearance] : the appearance of the neck was normal [Thyroid Diffuse Enlargement] : the thyroid was not enlarged [Jugular Venous Distention Increased] : there was no jugular-venous distention [Neck Cervical Mass (___cm)] : no neck mass was observed [Thyroid Nodule] : there were no palpable thyroid nodules [Heart Rate And Rhythm] : heart rate and rhythm were normal [Heart Sounds] : normal S1 and S2 [Murmurs] : no murmurs present [Respiration, Rhythm And Depth] : normal respiratory rhythm and effort [Auscultation Breath Sounds / Voice Sounds] : lungs were clear to auscultation bilaterally [Exaggerated Use Of Accessory Muscles For Inspiration] : no accessory muscle use [Abdomen Soft] : soft [Abdomen Tenderness] : non-tender [Abdomen Mass (___ Cm)] : no abdominal mass palpated [Gait - Sufficient For Exercise Testing] : the gait was sufficient for exercise testing [Abnormal Walk] : normal gait [Cyanosis, Localized] : no localized cyanosis [Nail Clubbing] : no clubbing of the fingernails [Skin Color & Pigmentation] : normal skin color and pigmentation [Petechial Hemorrhages (___cm)] : no petechial hemorrhages [Skin Lesions] : no skin lesions [No Venous Stasis] : no venous stasis [] : no rash [Deep Tendon Reflexes (DTR)] : deep tendon reflexes were 2+ and symmetric [No Skin Ulcers] : no skin ulcer [No Xanthoma] : no  xanthoma was observed [No Focal Deficits] : no focal deficits [Sensation] : the sensory exam was normal to light touch and pinprick [Oriented To Time, Place, And Person] : oriented to person, place, and time [Affect] : the affect was normal [Impaired Insight] : insight and judgment were intact [FreeTextEntry1] : I:E ratio 1:3; clear

## 2019-12-04 NOTE — ASSESSMENT
[FreeTextEntry1] : Mr. Mathur is a 24 year old male with a history of SOB, GERD, poor sleep, Tourette's syndrome, asthma, allergy, ADD, sleep apnea, ?RLS, insomnia, obesity, OCD, and anxiety presenting to the office for a follow up pulmonary re-evaluation - residual fatigue, and interrupted sleep.\par \par His shortness of breath is multifactorial due to:\par -obesity/out of shape\par -poor breathing mechanics\par Less likely\par -?CAD\par -?asthma\par \par problem 1: STEPHANIE\par -continue use of CPAP at pressure of 10, using religiously, tolerating it well, and seeing benefits. \par -continue of Modafinil 200 mg QAM \par -Sleep apnea is associated with adverse clinical consequences which an affect most organ systems.  Cardiovascular disease risk includes arrhythmias, atrial fibrillation, hypertension, coronary artery disease, and stroke. Metabolic disorders include diabetes type 2, non-alcoholic fatty liver disease. Mood disorder especially depression; and cognitive decline especially in the elderly. Associations with  chronic reflux/Leahy’s esophagus some but not all inclusive. \par -Reasons  include arousal consistent with hypopnea; respiratory events most prominent in REM sleep or supine position; therefore sleep staging and body position are important for accurate diagnosis and estimation of AHI. \par \par problem 2: insomnia/poor sleep\par -Recommended to use Insomnitol (take 1st) / Requip .5 mg\par -recommended to use Sleep Guard (2nd, if he wakes up)\par -Recommended to take Silenor 3 mg QHS (if he wakes up)\par -recommended to use sunglasses 30 minutes before bedtime and to try room darkening window shades\par -Good sleep hygiene was encouraged including avoiding watching television an hour before bed, keeping caffeine at a low,  avoiding reading, television, or anything, in bed, no drinking any liquids three hours before bedtime, and only getting into bed when tired and ready for sleep. \par \par problem 3: RLS\par - He is s/p blood work, which revealed: ferratin level (normal), iron binding level (normal), testosterone level (low), TFT (normal) and vitamin D level (normal)\par - Continue Requip 2.0 mg QHS \par -Restless Legs Syndrome (RLS), also known as Stewart-Ekbom Disease, is a common sleep -related movement disorder. About 1 in 10 adults in the [place] have problems from restless leg syndrome. It also can be seen in about 2% of children. Women are twice as likely as men to have RLS. People with RLS will have symptoms  most often during times when they are less active, especially at bedtime. RLS most often causes an overwhelming urge to move your legs and sometimes other parts of your body. This urge is associated with unpleasant sensations in different parts of th body. The symptoms can be mild to severe and can affect your ability to go to sleep and stay asleep. People with RLS often sleep less at night and feel more tired during the day. \par \par problem 4: abnormal PFTs-  c/w Asthma \par -MCT c/w asthma\par -confirms sleep apnea - inspiratory limb flattened\par -continue Breo 200 1 inhalation QD\par -continue Ventolin 2 puffs Q6H \par -continue Singulair 10 mg QHS \par \par problem 5: allergy sinus \par -continue Olopatadine 0.6% 1 sniff BID \par Environmental measures for allergies were encouraged including mattress and pillow cover, air purifier, and environmental controls \par \par problem 6: residual fatigue\par -continue Modafinil 200 mg QAM (5AM) - I-STOP reviewed\par \par Problem 7: GERD\par - Continue Prilosec before breakfast\par - continue Zantac 300 mg QHS\par - [place] Feldman - c/w mild GERD/HH \par \par Things to avoid including overeating, spicy foods, tight clothing, eating within three hours of bed, this list is not all inclusive. \par -Rule of 2s: avoid eating too much, eating too late, eating too spicy, eating two hours before bed\par -For treatment of reflux, possible options discussed including diet control, H2 blockers, PPIs, as well as coating motility agents discussed as treatment options. Timing of meals and proximity of last meal to sleep were discussed. If symptoms persist, a formal gastrointestinal evaluation is needed. \par \par problem 8: low testosterone\par -recommended supplemental Boron 6mg QD\par -Referred to endocrinologist for further evaluation \par \par problem 9: poor breathing mechanics\par -Proper breathing techniques were reviewed with an emphasis of exhalation. Patient instructed to breath in for 1 second and out for four seconds. Patient was encouraged to not talk while walking. \par \par problem 10: obesity/out of shape\par -recommended to increase protein and decrease carbohydrates\par -recommended to reduce caffeine and increase water intake\par -Weight loss, exercise, and diet control were discussed and are highly encouraged. Treatment options were given such as, aqua therapy, and contacting a nutritionist. Recommended to use the elliptical, stationary bike, refrain from use of treadmill. Mindful eating was explained to the patient. Obesity is associated with worsening asthma, shortness of breath, and potential for cardiac disease, diabetes, and other underlying medical conditions.\par \par problem 11: health maintenance \par - He received an influenza vaccination 2018\par -recommended strep pneumonia vaccines: Prevnar-13 vaccine, followed by Pneumo vaccine 23 one year following\par -recommended early intervention for URIs\par -recommended regular osteoporosis evaluations\par -recommended early dermatological evaluations\par -recommended after the age of 50 to the age of 70, colonoscopy every 5 years \par  \par \par Follow up in 4 months \par Patient is encouraged to call with any changes, concerns or questions.

## 2019-12-04 NOTE — ADDENDUM
[FreeTextEntry1] : Documented by Julio Henry acting as a scribe for Dr. Brant Marin on 12/04/2019.\par \par All medical record entries made by the Scribe were at my, Dr. Brant Marin's, direction and personally dictated by me on 12/04/2019. I have reviewed the chart and agree that the record accurately reflects my personal performance of the history, physical exam, assessment and plan. I have also personally directed, reviewed, and agree with the discharge instructions.

## 2019-12-04 NOTE — HISTORY OF PRESENT ILLNESS
[FreeTextEntry1] : Mr. Mathur is a 24 year old male with a history of abnormal PFTs, GERD, low testosterone, STEPHANIE on CPAP, overweight, poor sleep, RLS, and SOB presenting to the office today for a follow up visit. His chief complaint is poor sleep.\par -he reports having sleep issues, interrupted over 10 times per night\par -he states he has sweats\par -he reports haivng some dysphagia\par -he notes having an occasional dry mouth\par -he reports he needs to wash his eyes in the morning to prevent a burning sensation\par -he reports having lost 10 pounds by going to the gym 4-5 times per week\par -he reports having MAO\par -he reports he uses his CPAP nightly, and sleeps better with it than without it\par -he doesn’t believe he moves at night\par -he notes his sinuses are clear\par -he states he takes sleep guard and insomnitol, and have helped him initiate sleep, but not remain asleep. He notes he takes Requip 1 hour before going to bed\par -he denies any coughing, wheezing, headaches, nausea, vomiting, fever, chills, chest pain, chest pressure, diarrhea, constipation, dysphagia, dizziness, sour taste in the mouth, heartburn, reflux

## 2019-12-04 NOTE — PROCEDURE
[FreeTextEntry1] : PFT revealed normal flows, with a FEV1 of 4.18L, which is 94% of predicted, with a normal flow volume loop

## 2019-12-04 NOTE — REVIEW OF SYSTEMS
[Recent Wt Loss (___ Lbs)] : recent [unfilled] ~Ulb weight loss [As Noted in HPI] : as noted in HPI [Difficulty Maintaining Sleep] : difficulty maintaining sleep [Snoring] : snoring [Awakes With Dry Mouth] : awakes with dry mouth [Nonrestorative Sleep] : nonrestorative sleep [Hypersomnolence] : sleeping much more than usual [Negative] : Sleep Disorder [Cough] : no cough [Reflux] : no reflux [Wheezing] : no wheezing [Chest Discomfort] : no chest discomfort [Heartburn] : no heartburn [Constipation] : no constipation [Diarrhea] : no diarrhea [Dysphagia] : no dysphagia [Dizziness] : no dizziness [Difficulty Initiating Sleep] : no difficulty falling asleep

## 2020-01-05 ENCOUNTER — RX RENEWAL (OUTPATIENT)
Age: 25
End: 2020-01-05

## 2020-01-06 ENCOUNTER — RX RENEWAL (OUTPATIENT)
Age: 25
End: 2020-01-06

## 2020-02-04 ENCOUNTER — RX RENEWAL (OUTPATIENT)
Age: 25
End: 2020-02-04

## 2020-03-04 ENCOUNTER — APPOINTMENT (OUTPATIENT)
Dept: PULMONOLOGY | Facility: CLINIC | Age: 25
End: 2020-03-04
Payer: COMMERCIAL

## 2020-03-04 ENCOUNTER — LABORATORY RESULT (OUTPATIENT)
Age: 25
End: 2020-03-04

## 2020-03-04 VITALS
DIASTOLIC BLOOD PRESSURE: 70 MMHG | SYSTOLIC BLOOD PRESSURE: 120 MMHG | HEART RATE: 79 BPM | WEIGHT: 296.2 LBS | RESPIRATION RATE: 17 BRPM | HEIGHT: 69 IN | OXYGEN SATURATION: 98 % | BODY MASS INDEX: 43.87 KG/M2

## 2020-03-04 PROCEDURE — 99214 OFFICE O/P EST MOD 30 MIN: CPT | Mod: 25

## 2020-03-04 PROCEDURE — 94010 BREATHING CAPACITY TEST: CPT

## 2020-03-04 PROCEDURE — 95012 NITRIC OXIDE EXP GAS DETER: CPT

## 2020-03-04 RX ORDER — ROPINIROLE 2 MG/1
2 TABLET, FILM COATED ORAL
Qty: 90 | Refills: 1 | Status: DISCONTINUED | COMMUNITY
Start: 2018-11-06 | End: 2020-03-04

## 2020-03-04 NOTE — PHYSICAL EXAM
[Normal Appearance] : normal appearance [General Appearance - Well Developed] : well developed [General Appearance - Well Nourished] : well nourished [No Deformities] : no deformities [Well Groomed] : well groomed [General Appearance - In No Acute Distress] : no acute distress [Normal Conjunctiva] : the conjunctiva exhibited no abnormalities [Eyelids - No Xanthelasma] : the eyelids demonstrated no xanthelasmas [Normal Oropharynx] : normal oropharynx [III] : III [Neck Appearance] : the appearance of the neck was normal [Jugular Venous Distention Increased] : there was no jugular-venous distention [Neck Cervical Mass (___cm)] : no neck mass was observed [Thyroid Diffuse Enlargement] : the thyroid was not enlarged [Heart Rate And Rhythm] : heart rate and rhythm were normal [Thyroid Nodule] : there were no palpable thyroid nodules [Heart Sounds] : normal S1 and S2 [Murmurs] : no murmurs present [Respiration, Rhythm And Depth] : normal respiratory rhythm and effort [Exaggerated Use Of Accessory Muscles For Inspiration] : no accessory muscle use [Auscultation Breath Sounds / Voice Sounds] : lungs were clear to auscultation bilaterally [Abdomen Tenderness] : non-tender [Abdomen Soft] : soft [Gait - Sufficient For Exercise Testing] : the gait was sufficient for exercise testing [Abdomen Mass (___ Cm)] : no abdominal mass palpated [Abnormal Walk] : normal gait [Cyanosis, Localized] : no localized cyanosis [Nail Clubbing] : no clubbing of the fingernails [Skin Color & Pigmentation] : normal skin color and pigmentation [Petechial Hemorrhages (___cm)] : no petechial hemorrhages [] : no rash [No Venous Stasis] : no venous stasis [Skin Lesions] : no skin lesions [No Skin Ulcers] : no skin ulcer [No Xanthoma] : no  xanthoma was observed [Deep Tendon Reflexes (DTR)] : deep tendon reflexes were 2+ and symmetric [No Focal Deficits] : no focal deficits [Sensation] : the sensory exam was normal to light touch and pinprick [Oriented To Time, Place, And Person] : oriented to person, place, and time [Impaired Insight] : insight and judgment were intact [Affect] : the affect was normal [FreeTextEntry1] : I:E ratio 1:3; clear

## 2020-03-04 NOTE — ASSESSMENT
[FreeTextEntry1] : Mr. Mathur is a 24 year old male with a history of SOB, GERD, poor sleep, Tourette's syndrome, asthma, allergy, ADD, sleep apnea, ?RLS, insomnia, obesity, OCD, and anxiety presenting to the office for a follow up pulmonary re-evaluation - residual fatigue, despite 10-12 hours of sleep - ?food allergies. \par \par His shortness of breath is multifactorial due to:\par -obesity/out of shape\par -poor breathing mechanics\par Less likely\par -?CAD\par -?asthma\par \par problem 1: STEPHANIE\par -continue use of CPAP at pressure of 10, using religiously, tolerating it well, and seeing benefits. \par -continue of Modafinil 200 mg QAM \par -Sleep apnea is associated with adverse clinical consequences which an affect most organ systems.  Cardiovascular disease risk includes arrhythmias, atrial fibrillation, hypertension, coronary artery disease, and stroke. Metabolic disorders include diabetes type 2, non-alcoholic fatty liver disease. Mood disorder especially depression; and cognitive decline especially in the elderly. Associations with  chronic reflux/Leahy’s esophagus some but not all inclusive. \par -Reasons  include arousal consistent with hypopnea; respiratory events most prominent in REM sleep or supine position; therefore sleep staging and body position are important for accurate diagnosis and estimation of AHI. \par \par problem 2: insomnia/poor sleep\par -Recommended to use Insomnitol (take 1st) / Requip .5 mg\par -recommended to use Sleep Guard (2nd, if he wakes up)\par -recommended to use sunglasses 30 minutes before bedtime and to try room darkening window shades\par -Good sleep hygiene was encouraged including avoiding watching television an hour before bed, keeping caffeine at a low,  avoiding reading, television, or anything, in bed, no drinking any liquids three hours before bedtime, and only getting into bed when tired and ready for sleep. \par \par problem 3: RLS\par - He is s/p blood work, which revealed: ferratin level (normal), iron binding level (normal), testosterone level (low), TFT (normal) and vitamin D level (normal)\par - Discontinue Requip 2.0 mg QHS and transition to Mirapex 0.5mg QHS\par -Restless Legs Syndrome (RLS), also known as Stewart-Ekbom Disease, is a common sleep -related movement disorder. About 1 in 10 adults in the [place] have problems from restless leg syndrome. It also can be seen in about 2% of children. Women are twice as likely as men to have RLS. People with RLS will have symptoms  most often during times when they are less active, especially at bedtime. RLS most often causes an overwhelming urge to move your legs and sometimes other parts of your body. This urge is associated with unpleasant sensations in different parts of th body. The symptoms can be mild to severe and can affect your ability to go to sleep and stay asleep. People with RLS often sleep less at night and feel more tired during the day. \par \par problem 4: abnormal PFTs-  c/w Asthma \par -MCT c/w asthma\par -confirms sleep apnea - inspiratory limb flattened\par -continue Breo 200 1 inhalation QD\par -continue Ventolin 2 puffs Q6H \par -continue Singulair 10 mg QHS \par \par problem 5: allergy sinus \par -continue Olopatadine 0.6% 1 sniff BID \par Environmental measures for allergies were encouraged including mattress and pillow cover, air purifier, and environmental controls \par \par problem 6: residual fatigue\par -continue Modafinil 200 mg QAM (5AM) - I-STOP reviewed\par \par Problem 7: GERD\par - Continue Prilosec before breakfast\par -Add Pepcid 40mg QHS \par - Bayamon Feldman - c/w mild GERD/HH \par \par Things to avoid including overeating, spicy foods, tight clothing, eating within three hours of bed, this list is not all inclusive. \par -Rule of 2s: avoid eating too much, eating too late, eating too spicy, eating two hours before bed\par -For treatment of reflux, possible options discussed including diet control, H2 blockers, PPIs, as well as coating motility agents discussed as treatment options. Timing of meals and proximity of last meal to sleep were discussed. If symptoms persist, a formal gastrointestinal evaluation is needed. \par \par problem 8: low testosterone\par -recommended supplemental Boron 6mg QD\par -Referred to endocrinologist for further evaluation \par \par problem 9: poor breathing mechanics\par -Proper breathing techniques were reviewed with an emphasis of exhalation. Patient instructed to breath in for 1 second and out for four seconds. Patient was encouraged to not talk while walking. \par \par problem 10: obesity/out of shape\par -recommended to increase protein and decrease carbohydrates\par -recommended to reduce caffeine and increase water intake\par -Weight loss, exercise, and diet control were discussed and are highly encouraged. Treatment options were given such as, aqua therapy, and contacting a nutritionist. Recommended to use the elliptical, stationary bike, refrain from use of treadmill. Mindful eating was explained to the patient. Obesity is associated with worsening asthma, shortness of breath, and potential for cardiac disease, diabetes, and other underlying medical conditions.\par \par problem 11: health maintenance \par - He received an influenza vaccination 2019\par -recommended strep pneumonia vaccines: Prevnar-13 vaccine, followed by Pneumo vaccine 23 one year following\par -recommended early intervention for URIs\par -recommended regular osteoporosis evaluations\par -recommended early dermatological evaluations\par -recommended after the age of 50 to the age of 70, colonoscopy every 5 years \par  \par \par Follow up in 4 months \par Patient is encouraged to call with any changes, concerns or questions.

## 2020-03-04 NOTE — ADDENDUM
[FreeTextEntry1] : Documented by Aram Burgess acting as a scribe for Dr. Brant Marin on 03/04/2020 \par \par All medical record entries made by the Scribe were at my, Dr. Brant Marin's, direction and personally dictated by me on 03/04/2020 . I have reviewed the chart and agree that the record accurately reflects my personal performance of the history, physical exam, assessment and plan. I have also personally directed, reviewed, and agree with the discharge instructions.

## 2020-03-04 NOTE — PROCEDURE
[FreeTextEntry1] : PFT reveals normal flows, with an FEV1 of  4.12L, which is 93% of predicted, with a normal flow volume loop \par \par FENO was 25; a normal value being less than 25\par Fractional exhaled nitric oxide (FENO) is regarded as a simple, noninvasive method for assessing eosinophilic airway inflammation. Produced by a variety of cells within the lung, nitric oxide (NO) concentrations are generally low in healthy individuals. However, high concentrations of NO appear to be involved in nonspecific host defense mechanisms and chronic inflammatory diseases such as asthma. The American Thoracic Society (ATS) therefore has recommended using FENO to aid in the diagnosis and monitoring of eosinophilic airway inflammation and asthma, and for identifying steroid responsive individuals whose chronic respiratory symptoms may be caused by airway inflammation.

## 2020-03-04 NOTE — HISTORY OF PRESENT ILLNESS
[FreeTextEntry1] : Mr. Mathur is a 25 year old male with a history of abnormal PFTs, GERD, low testosterone, STEPHANIE on CPAP, overweight, poor sleep, RLS, and SOB presenting to the office today for a follow up visit. His chief complaint is residual fatigue despite 10-12 hours of sleep. \par -not doing well from the sleep perspective. \par -he states that he feels the need to sleep longer to feel rested. \par -has been sleeping 12 hours and still does not feel rested; wakes up more than 10 times during the 12 hours. \par -he states that he feels like he is sleeping well but does not feel rested when he gets up.\par -denies palpitations. \par -has itchy eyes \par -has a lot of sweats when he is sleeping. \par -not coughing much.\par -no leg swelling or leg pain. \par -no SOB. \par -he notes that His bowels are regular. \par -sense of taste is good. \par -sometimes sense of smell is diminished. \par -works out by going to the gym 3 times a week. \par -he reports that after working out he feels the need to sleep. \par -he has been eating a lot more vegetable and eliminated Carbs and sugars. \par -has lost 15 pounds recently. \par -has seen an endocrinologist - Dr. farshad kinsey\par -he states that any of the meds are not causing him to feel tired, since he has been taking them since he was 9. \par -has been using the CPAP regularly. \par -takes Sleep Guard and Insomintol for his sleep. \par -still taking his RLS medication. \par \par -He denies any chest pain, chest pressure, diarrhea, constipation, dysphagia, dizziness, sour taste in the mouth, leg swelling, leg pain,  itchy ears, heartburn, reflux, myalgias or arthralgias.

## 2020-03-05 LAB
24R-OH-CALCIDIOL SERPL-MCNC: 64.4 PG/ML
25(OH)D3 SERPL-MCNC: 20.2 NG/ML
BASOPHILS # BLD AUTO: 0.03 K/UL
BASOPHILS NFR BLD AUTO: 0.4 %
EOSINOPHIL # BLD AUTO: 0.06 K/UL
EOSINOPHIL NFR BLD AUTO: 0.7 %
HCT VFR BLD CALC: 48.3 %
HGB BLD-MCNC: 15.3 G/DL
IMM GRANULOCYTES NFR BLD AUTO: 0.2 %
LYMPHOCYTES # BLD AUTO: 2.25 K/UL
LYMPHOCYTES NFR BLD AUTO: 26.3 %
MAN DIFF?: NORMAL
MCHC RBC-ENTMCNC: 28 PG
MCHC RBC-ENTMCNC: 31.7 GM/DL
MCV RBC AUTO: 88.5 FL
MONOCYTES # BLD AUTO: 0.8 K/UL
MONOCYTES NFR BLD AUTO: 9.4 %
NEUTROPHILS # BLD AUTO: 5.38 K/UL
NEUTROPHILS NFR BLD AUTO: 63 %
PLATELET # BLD AUTO: 255 K/UL
RBC # BLD: 5.46 M/UL
RBC # FLD: 12.6 %
WBC # FLD AUTO: 8.54 K/UL

## 2020-03-06 ENCOUNTER — NON-APPOINTMENT (OUTPATIENT)
Age: 25
End: 2020-03-06

## 2020-03-06 LAB
A ALTERNATA IGE QN: <0.1 KUA/L
A FUMIGATUS IGE QN: <0.1 KUA/L
C ALBICANS IGE QN: <0.1 KUA/L
C HERBARUM IGE QN: <0.1 KUA/L
CAT DANDER IGE QN: 0.71 KUA/L
COMMON RAGWEED IGE QN: <0.1 KUA/L
D FARINAE IGE QN: <0.1 KUA/L
D PTERONYSS IGE QN: <0.1 KUA/L
DEPRECATED A ALTERNATA IGE RAST QL: 0
DEPRECATED A FUMIGATUS IGE RAST QL: 0
DEPRECATED C ALBICANS IGE RAST QL: 0
DEPRECATED C HERBARUM IGE RAST QL: 0
DEPRECATED CAT DANDER IGE RAST QL: 2
DEPRECATED COMMON RAGWEED IGE RAST QL: 0
DEPRECATED D FARINAE IGE RAST QL: 0
DEPRECATED D PTERONYSS IGE RAST QL: 0
DEPRECATED DOG DANDER IGE RAST QL: NORMAL
DEPRECATED M RACEMOSUS IGE RAST QL: 0
DEPRECATED ROACH IGE RAST QL: 0
DEPRECATED TIMOTHY IGE RAST QL: 2
DEPRECATED WHITE OAK IGE RAST QL: 0
DOG DANDER IGE QN: 0.3 KUA/L
M RACEMOSUS IGE QN: <0.1 KUA/L
ROACH IGE QN: <0.1 KUA/L
TIMOTHY IGE QN: 3.09 KUA/L
TOTAL IGE SMQN RAST: 29 KU/L
WHITE OAK IGE QN: <0.1 KUA/L

## 2020-03-08 LAB
CLAM IGE QN: <0.1 KUA/L
CODFISH IGE QN: <0.1 KUA/L
CORN IGE QN: <0.1 KUA/L
COW MILK IGE QN: 0.1 KUA/L
DEPRECATED CLAM IGE RAST QL: 0
DEPRECATED CODFISH IGE RAST QL: 0
DEPRECATED CORN IGE RAST QL: 0
DEPRECATED COW MILK IGE RAST QL: NORMAL
DEPRECATED EGG WHITE IGE RAST QL: 0
DEPRECATED PEANUT IGE RAST QL: 0
DEPRECATED SCALLOP IGE RAST QL: <0.1 KUA/L
DEPRECATED SESAME SEED IGE RAST QL: 0
DEPRECATED SHRIMP IGE RAST QL: 0
DEPRECATED SOYBEAN IGE RAST QL: 0
DEPRECATED WALNUT IGE RAST QL: 0
DEPRECATED WHEAT IGE RAST QL: 0
EGG WHITE IGE QN: <0.1 KUA/L
PEANUT IGE QN: <0.1 KUA/L
SCALLOP IGE QN: 0
SCALLOP IGE QN: <0.1 KUA/L
SESAME SEED IGE QN: <0.1 KUA/L
SOYBEAN IGE QN: <0.1 KUA/L
WALNUT IGE QN: <0.1 KUA/L
WHEAT IGE QN: <0.1 KUA/L

## 2020-04-28 ENCOUNTER — TRANSCRIPTION ENCOUNTER (OUTPATIENT)
Age: 25
End: 2020-04-28

## 2020-05-04 ENCOUNTER — APPOINTMENT (OUTPATIENT)
Dept: ENDOCRINOLOGY | Facility: CLINIC | Age: 25
End: 2020-05-04
Payer: COMMERCIAL

## 2020-05-04 VITALS — WEIGHT: 296 LBS | BODY MASS INDEX: 43.84 KG/M2 | HEIGHT: 69 IN

## 2020-05-04 PROCEDURE — 99204 OFFICE O/P NEW MOD 45 MIN: CPT | Mod: 95

## 2020-05-04 NOTE — ASSESSMENT
[FreeTextEntry1] : Discussed with the patient in details current approaches to hypogonadism and metabolic syndrome management. \par It is likely multifactorial in etiology , including underlying obesity and STEPHANIE. \par I've advised extensive nutritional education program, including weight loss teaching and evaluation. Proper dietary restrictions and exercise routines discussed. He unfortunately failed several weight loss programs including Weight Watchers.Medical weight loss therapies were reviewed with the patient. Bariatric options discussed again. Patient might be a good candidate for a bariatric surgery (preferably a sleeve gastrectomy) and he'll follow up with Dr. Gambino. \par - R+B of testosterone replacement reviewed in details, including worsening of STEPHANIE and potential adverse effects on CV system, effect on hematocrit, PSA, and liver functions, as well as decrease in sperm count.\par - I reviewed with the patient topical vs injectable vs implantable testosterone preparations, as well as proper use/applications/precautions and monitoring.\par - I advised that the successful weight loss will likely to improve testosterone levels, as well as sleep apnea, blood pressure and sugar, and a cholesterol.\par - will check fasting lipids, a1c, CMP, testosterone, gonadotropins, prolactin. If indicated, will do pituitary MRI\par - monitor thyroid levels. Continue L-thyroxine 50 mcg , pending labs\par RTC post labs

## 2020-05-04 NOTE — HISTORY OF PRESENT ILLNESS
[Home] : at home, [unfilled] , at the time of the visit. [Medical Office: ([facility])___] : at the medical office located in  [Patient] : the patient [Self] : self [FreeTextEntry1] : 25 year  Male referred for the evaluation of his chronic fatigue, low testosterone levels and hypothyroidism management. \par Mr Mathur has been suffering from the morbid obesity and has been diagnosed with STEPHANIE about 4-5 years ago. He's been treated with CPAP which helped with sleeping, but his fatigue did not improve much. He was told that his testosterone was low in the past, but he did not take any testosterone supplements. He was also diagnosed with hypothyroidism about 2 years ago, and he's been on a stable dose of L-thyroxine 50 mcg qd.\par He denies ED and his spontaneous am erections are more or less preserved. However, his libido has decreased a lot. Shaving is well preserved. He is not in relationship right now; masturbates once every other week because of the low desire. Denies visual disturbances or headaches, dizziness, nausea, vomiting, abdominal pain,  breast discharge, worsening in peripheral vision. \par He underwent puberty at a normal rate compared with his peers. No history of learning disabilities or behavioral disorders. No history of insults to the brain or testes, including surgery, trauma, tumors, or radiation exposure. \par No history of diabetes, HIV, liver disease, or kidney disease. No history of medication use including anabolic steroids, glucocorticoids, chronic pain medications, or history of chemotherapy. His sense of smell is intact. \par Family history is negative for infertility problems and low testosterone.\par \par

## 2020-05-04 NOTE — REASON FOR VISIT
[Consultation] : a consultation visit [Hypothyroidism] : hypothyroidism [Weight Management/Obesity] : weight management/obesity [Hypogonadism] : hypogonadism

## 2020-05-05 ENCOUNTER — RX RENEWAL (OUTPATIENT)
Age: 25
End: 2020-05-05

## 2020-06-09 ENCOUNTER — APPOINTMENT (OUTPATIENT)
Dept: PULMONOLOGY | Facility: CLINIC | Age: 25
End: 2020-06-09
Payer: COMMERCIAL

## 2020-06-09 PROCEDURE — 99214 OFFICE O/P EST MOD 30 MIN: CPT | Mod: 95

## 2020-06-09 NOTE — ADDENDUM
[FreeTextEntry1] : Documented by Darleen Jackson acting as a scribe for Dr. Brant Marin on (06/09/2020).\par \par All medical record entries made by the Scribe were at my, Dr. Brant Marin's, direction and personally dictated by me on (06/09/2020). I have reviewed the chart and agree that the record accurately reflects my personal performance of the history, physical exam, assessment and plan. I have also personally directed, reviewed, and agree with the discharge instructions. \par \par \par

## 2020-06-09 NOTE — HISTORY OF PRESENT ILLNESS
[Home] : at home, [unfilled] , at the time of the visit. [Medical Office: ([facility])___] : at the medical office located in  [Verbal consent obtained from patient] : the patient, [unfilled] [FreeTextEntry1] : Mr. Mathur is a 25 year old male with a history of abnormal PFTs, GERD, low testosterone, STEPHANIE on CPAP, overweight, poor sleep, RLS, and SOB presenting to the office today via video call for a follow up visit. His chief complaint is\par - He is doing okay \par - He is fatigued, same since his last visit\par - He is sleeping well \par - He started taking vitamin D supplements\par - He started seeing endocrinology (Mathieu)\par - He is exercising \par - He is not SOB\par - He is sleeping bad because his father was recently diagnosed with cancer \par - He is not dieting \par -he denies any headaches, nausea, vomiting, fever, chills, sweats, chest pain, chest pressure, diarrhea, constipation, dysphagia, dizziness, sour taste in the mouth, leg swelling, leg pain, itchy eyes, itchy ears, heartburn, reflux, myalgias or arthralgias.\par

## 2020-06-09 NOTE — REASON FOR VISIT
[Follow-Up] : a follow-up visit [FreeTextEntry1] : video call- abnormal PFTs, GERD, low testosterone, STEPHANIE on CPAP, overweight, poor sleep, RLS, and SOB

## 2020-06-09 NOTE — ASSESSMENT
[FreeTextEntry1] : Mr. Mathur is a 25 year old male with a history of SOB, GERD, poor sleep, Tourette's syndrome, asthma, allergy, ADD, sleep apnea, ?RLS, insomnia, obesity, OCD, and anxiety presenting to the office via video call for a follow up pulmonary re-evaluation - residual fatigue, despite 10-12 hours of sleep - +food allergies. Dx of hypogonadism, hypothyroidism, allergies- still fatigued. \par \par His shortness of breath is multifactorial due to:\par -obesity/out of shape\par -poor breathing mechanics\par Less likely\par -?CAD\par -?asthma\par \par problem 1: STEPHANIE\par -continue use of CPAP at pressure of 10, using religiously, tolerating it well, and seeing benefits. \par -continue of Modafinil 200 mg QAM \par -Sleep apnea is associated with adverse clinical consequences which an affect most organ systems.  Cardiovascular disease risk includes arrhythmias, atrial fibrillation, hypertension, coronary artery disease, and stroke. Metabolic disorders include diabetes type 2, non-alcoholic fatty liver disease. Mood disorder especially depression; and cognitive decline especially in the elderly. Associations with  chronic reflux/Leahy’s esophagus some but not all inclusive. \par -Reasons  include arousal consistent with hypopnea; respiratory events most prominent in REM sleep or supine position; therefore sleep staging and body position are important for accurate diagnosis and estimation of AHI. \par \par problem 2: insomnia/poor sleep\par -Recommended to use Insomnitol (take 1st) / Requip .5 mg\par -recommended to use Sleep Guard (2nd, if he wakes up)\par -recommended to use sunglasses 30 minutes before bedtime and to try room darkening window shades\par -Good sleep hygiene was encouraged including avoiding watching television an hour before bed, keeping caffeine at a low,  avoiding reading, television, or anything, in bed, no drinking any liquids three hours before bedtime, and only getting into bed when tired and ready for sleep. \par \par problem 3: RLS\par - He is s/p blood work, which revealed: ferritin level (normal), iron binding level (normal), testosterone level (low), TFT (normal) and vitamin D level (normal)\par - continue  Mirapex 0.5mg QHS\par -Restless Legs Syndrome (RLS), also known as Stewart-Ekbom Disease, is a common sleep -related movement disorder. About 1 in 10 adults in the [place] have problems from restless leg syndrome. It also can be seen in about 2% of children. Women are twice as likely as men to have RLS. People with RLS will have symptoms  most often during times when they are less active, especially at bedtime. RLS most often causes an overwhelming urge to move your legs and sometimes other parts of your body. This urge is associated with unpleasant sensations in different parts of th body. The symptoms can be mild to severe and can affect your ability to go to sleep and stay asleep. People with RLS often sleep less at night and feel more tired during the day. \par \par problem 4: abnormal PFTs-  c/w Asthma \par -MCT c/w asthma\par -confirms sleep apnea - inspiratory limb flattened\par -continue Breo 200 1 inhalation QD\par -continue Ventolin 2 puffs Q6H \par -continue Singulair 10 mg QHS \par \par problem 5: allergy sinus \par -continue Olopatadine 0.6% 1 sniff BID \par - add Clarinex 5 mg QAM\par Environmental measures for allergies were encouraged including mattress and pillow cover, air purifier, and environmental controls \par \par problem 6: residual fatigue\par -continue Modafinil 200 mg QAM (5AM) - I-STOP reviewed\par - reread endocrinology results \par \par Problem 7: GERD\par - Continue Prilosec before breakfast\par -continue Pepcid 40mg QHS \par - Alexander Feldman - c/w mild GERD/HH \par \par Things to avoid including overeating, spicy foods, tight clothing, eating within three hours of bed, this list is not all inclusive. \par -Rule of 2s: avoid eating too much, eating too late, eating too spicy, eating two hours before bed\par -For treatment of reflux, possible options discussed including diet control, H2 blockers, PPIs, as well as coating motility agents discussed as treatment options. Timing of meals and proximity of last meal to sleep were discussed. If symptoms persist, a formal gastrointestinal evaluation is needed. \par \par problem 8: low testosterone\par -recommended supplemental Boron 6mg QD\par -Referred to endocrinologist for further evaluation (Mathieu)\par - Referred to urologist (Dave Hoenig)\par \par problem 9: poor breathing mechanics\par -Proper breathing techniques were reviewed with an emphasis of exhalation. Patient instructed to breath in for 1 second and out for four seconds. Patient was encouraged to not talk while walking. \par \par problem 10: obesity/out of shape\par - recommended to see Dr. Silva \par -recommended to increase protein and decrease carbohydrates\par -recommended to reduce caffeine and increase water intake\par -Weight loss, exercise, and diet control were discussed and are highly encouraged. Treatment options were given such as, aqua therapy, and contacting a nutritionist. Recommended to use the elliptical, stationary bike, refrain from use of treadmill. Mindful eating was explained to the patient. Obesity is associated with worsening asthma, shortness of breath, and potential for cardiac disease, diabetes, and other underlying medical conditions.\par \par Problem 11a: COVID-19 precautionary Immune Support Recommendations:\par -OTC Vitamin C 500mg BID \par -OTC Quercetin 250-500mg BID \par -OTC Zinc 75-100mg per day \par -OTC Melatonin 1 or 2mg a night \par -OTC Vitamin D 1-4000mg per day \par -OTC Tonic Water 8oz per day\par -Water 0.5-1 gallon per day\par \par problem 11: health maintenance \par - He received an influenza vaccination 2019\par -recommended strep pneumonia vaccines: Prevnar-13 vaccine, followed by Pneumo vaccine 23 one year following\par -recommended early intervention for URIs\par -recommended regular osteoporosis evaluations\par -recommended early dermatological evaluations\par -recommended after the age of 50 to the age of 70, colonoscopy every 5 years \par  \par \par Follow up in 4 months \par Patient is encouraged to call with any changes, concerns or questions.

## 2020-06-19 ENCOUNTER — TRANSCRIPTION ENCOUNTER (OUTPATIENT)
Age: 25
End: 2020-06-19

## 2020-06-19 LAB
25(OH)D3 SERPL-MCNC: 33 NG/ML
ALBUMIN SERPL ELPH-MCNC: 4.7 G/DL
ALP BLD-CCNC: 94 U/L
ALT SERPL-CCNC: 36 U/L
ANION GAP SERPL CALC-SCNC: 15 MMOL/L
AST SERPL-CCNC: 26 U/L
BASOPHILS # BLD AUTO: 0.01 K/UL
BASOPHILS NFR BLD AUTO: 0.1 %
BILIRUB SERPL-MCNC: 0.3 MG/DL
BUN SERPL-MCNC: 9 MG/DL
CALCIUM SERPL-MCNC: 9.9 MG/DL
CHLORIDE SERPL-SCNC: 99 MMOL/L
CHOLEST SERPL-MCNC: 158 MG/DL
CHOLEST/HDLC SERPL: 3.3 RATIO
CO2 SERPL-SCNC: 24 MMOL/L
CREAT SERPL-MCNC: 0.68 MG/DL
EOSINOPHIL # BLD AUTO: 0.08 K/UL
EOSINOPHIL NFR BLD AUTO: 1.1 %
ESTIMATED AVERAGE GLUCOSE: 108 MG/DL
ESTRADIOL SERPL-MCNC: 28 PG/ML
FSH SERPL-MCNC: 1.2 IU/L
GLUCOSE SERPL-MCNC: 108 MG/DL
HBA1C MFR BLD HPLC: 5.4 %
HCT VFR BLD CALC: 52.3 %
HDLC SERPL-MCNC: 48 MG/DL
HGB BLD-MCNC: 15.9 G/DL
IGF-1 INTERP: NORMAL
IGF-I BLD-MCNC: 364 NG/ML
IMM GRANULOCYTES NFR BLD AUTO: 0.1 %
LDLC SERPL CALC-MCNC: 83 MG/DL
LH SERPL-ACNC: 4.5 IU/L
LYMPHOCYTES # BLD AUTO: 2.46 K/UL
LYMPHOCYTES NFR BLD AUTO: 34.9 %
MAN DIFF?: NORMAL
MCHC RBC-ENTMCNC: 26.9 PG
MCHC RBC-ENTMCNC: 30.4 GM/DL
MCV RBC AUTO: 88.3 FL
MONOCYTES # BLD AUTO: 0.62 K/UL
MONOCYTES NFR BLD AUTO: 8.8 %
NEUTROPHILS # BLD AUTO: 3.86 K/UL
NEUTROPHILS NFR BLD AUTO: 55 %
PLATELET # BLD AUTO: 271 K/UL
POTASSIUM SERPL-SCNC: 4.4 MMOL/L
PROLACTIN SERPL-MCNC: 11.3 NG/ML
PROT SERPL-MCNC: 7.8 G/DL
PSA SERPL-MCNC: 0.43 NG/ML
RBC # BLD: 5.92 M/UL
RBC # FLD: 12.4 %
SHBG SERPL-SCNC: 13 NMOL/L
SODIUM SERPL-SCNC: 138 MMOL/L
T4 FREE SERPL-MCNC: 1.3 NG/DL
TESTOST BND SERPL-MCNC: 13.9 PG/ML
TESTOST SERPL-MCNC: 312.8 NG/DL
TRIGL SERPL-MCNC: 132 MG/DL
TSH SERPL-ACNC: 2.73 UIU/ML
VIT B12 SERPL-MCNC: 647 PG/ML
WBC # FLD AUTO: 7.04 K/UL

## 2020-06-29 LAB
ACTH SER-ACNC: 51.6 PG/ML
CORTIS SERPL-MCNC: 15.7 UG/DL
ESTRADIOL SERPL-MCNC: 35 PG/ML
FSH SERPL-MCNC: 1.1 IU/L
IGF-1 INTERP: NORMAL
IGF-I BLD-MCNC: 296 NG/ML
LH SERPL-ACNC: 5.8 IU/L
PROLACTIN SERPL-MCNC: 47.9 NG/ML
SHBG SERPL-SCNC: 12 NMOL/L

## 2020-07-02 LAB
TESTOST BND SERPL-MCNC: 8.1 PG/ML
TESTOST SERPL-MCNC: 192.6 NG/DL

## 2020-07-05 ENCOUNTER — APPOINTMENT (OUTPATIENT)
Dept: MRI IMAGING | Facility: CLINIC | Age: 25
End: 2020-07-05
Payer: COMMERCIAL

## 2020-07-05 ENCOUNTER — OUTPATIENT (OUTPATIENT)
Dept: OUTPATIENT SERVICES | Facility: HOSPITAL | Age: 25
LOS: 1 days | End: 2020-07-05
Payer: COMMERCIAL

## 2020-07-05 DIAGNOSIS — E22.1 HYPERPROLACTINEMIA: ICD-10-CM

## 2020-07-05 PROCEDURE — 70553 MRI BRAIN STEM W/O & W/DYE: CPT | Mod: 26

## 2020-07-05 PROCEDURE — A9585: CPT

## 2020-07-05 PROCEDURE — 70553 MRI BRAIN STEM W/O & W/DYE: CPT

## 2020-07-07 ENCOUNTER — LABORATORY RESULT (OUTPATIENT)
Age: 25
End: 2020-07-07

## 2020-08-04 ENCOUNTER — TRANSCRIPTION ENCOUNTER (OUTPATIENT)
Age: 25
End: 2020-08-04

## 2020-08-04 LAB
GH SERPL-MCNC: 0.11 NG/ML
IGF-1 INTERP: NORMAL
IGF-I BLD-MCNC: 313 NG/ML

## 2020-08-06 LAB
GH SERPL-MCNC: <0.03 NG/ML
GLUCOSE BS SERPL-MCNC: 96 MG/DL

## 2020-08-07 LAB
GH SERPL-MCNC: <0.03 NG/ML
IGF-1 INTERP: NORMAL
IGF-1 INTERP: NORMAL
IGF-I BLD-MCNC: 276 NG/ML
IGF-I BLD-MCNC: 300 NG/ML

## 2020-08-08 ENCOUNTER — RX RENEWAL (OUTPATIENT)
Age: 25
End: 2020-08-08

## 2020-08-11 ENCOUNTER — TRANSCRIPTION ENCOUNTER (OUTPATIENT)
Age: 25
End: 2020-08-11

## 2020-08-26 ENCOUNTER — APPOINTMENT (OUTPATIENT)
Dept: FAMILY MEDICINE | Facility: CLINIC | Age: 25
End: 2020-08-26
Payer: COMMERCIAL

## 2020-08-26 VITALS
BODY MASS INDEX: 43.69 KG/M2 | RESPIRATION RATE: 17 BRPM | DIASTOLIC BLOOD PRESSURE: 60 MMHG | OXYGEN SATURATION: 99 % | HEIGHT: 69 IN | HEART RATE: 93 BPM | WEIGHT: 295 LBS | SYSTOLIC BLOOD PRESSURE: 120 MMHG

## 2020-08-26 DIAGNOSIS — R22.1 LOCALIZED SWELLING, MASS AND LUMP, NECK: ICD-10-CM

## 2020-08-26 PROCEDURE — 99213 OFFICE O/P EST LOW 20 MIN: CPT

## 2020-08-26 RX ORDER — CABERGOLINE 0.5 MG/1
0.5 TABLET ORAL
Qty: 12 | Refills: 3 | Status: DISCONTINUED | COMMUNITY
Start: 2020-08-07 | End: 2020-08-26

## 2020-08-26 RX ORDER — OLOPATADINE HYDROCHLORIDE 665 UG/1
0.6 SPRAY, METERED NASAL
Qty: 3 | Refills: 1 | Status: DISCONTINUED | COMMUNITY
Start: 2019-01-03 | End: 2020-08-26

## 2020-08-26 RX ORDER — HALOPERIDOL LACTATE 2 MG/ML
CONCENTRATE, ORAL ORAL
Refills: 0 | Status: DISCONTINUED | COMMUNITY
End: 2020-08-26

## 2020-08-26 RX ORDER — MODAFINIL 200 MG/1
200 TABLET ORAL
Qty: 30 | Refills: 5 | Status: DISCONTINUED | COMMUNITY
Start: 2018-11-03 | End: 2020-08-26

## 2020-08-26 RX ORDER — ALBUTEROL SULFATE 90 UG/1
108 (90 BASE) AEROSOL, METERED RESPIRATORY (INHALATION) EVERY 6 HOURS
Qty: 1 | Refills: 5 | Status: DISCONTINUED | COMMUNITY
Start: 2019-01-03 | End: 2020-08-26

## 2020-08-26 RX ORDER — DOXEPIN HYDROCHLORIDE 3 MG/1
3 TABLET ORAL
Qty: 30 | Refills: 5 | Status: DISCONTINUED | COMMUNITY
Start: 2019-12-04 | End: 2020-08-26

## 2020-08-26 NOTE — ASSESSMENT
[FreeTextEntry1] : no palpable lump\par will order us to area of concern\par \par thyroid\par is on synthroid, will order us to evaluate thyroid as well as cause of discomfort\par \par

## 2020-08-26 NOTE — HISTORY OF PRESENT ILLNESS
[FreeTextEntry8] : 25 year old male here with complaints of a lump in left throat. Patients active medications, allergies and issues were all reviewed with the patient at time of visit.\par \par

## 2020-08-29 ENCOUNTER — EMERGENCY (EMERGENCY)
Facility: HOSPITAL | Age: 25
LOS: 1 days | Discharge: HOME | End: 2020-08-29
Attending: EMERGENCY MEDICINE | Admitting: EMERGENCY MEDICINE
Payer: COMMERCIAL

## 2020-08-29 VITALS
HEART RATE: 99 BPM | SYSTOLIC BLOOD PRESSURE: 140 MMHG | RESPIRATION RATE: 18 BRPM | DIASTOLIC BLOOD PRESSURE: 84 MMHG | OXYGEN SATURATION: 100 % | TEMPERATURE: 98 F

## 2020-08-29 PROCEDURE — 93010 ELECTROCARDIOGRAM REPORT: CPT

## 2020-08-29 PROCEDURE — 99284 EMERGENCY DEPT VISIT MOD MDM: CPT | Mod: 25

## 2020-08-29 NOTE — ED ADULT TRIAGE NOTE - CHIEF COMPLAINT QUOTE
PT C/O feeling light headed and dizzy x 1 day. Denies fevers, SOB, LOC, nausea, vomiting. PHX: pituitary tumor, hypothyroid, asthma.

## 2020-08-30 VITALS
RESPIRATION RATE: 17 BRPM | OXYGEN SATURATION: 100 % | HEART RATE: 98 BPM | TEMPERATURE: 98 F | DIASTOLIC BLOOD PRESSURE: 70 MMHG | SYSTOLIC BLOOD PRESSURE: 146 MMHG

## 2020-08-30 LAB
ALBUMIN SERPL ELPH-MCNC: 4.1 G/DL — SIGNIFICANT CHANGE UP (ref 3.3–5)
ALP SERPL-CCNC: 71 U/L — SIGNIFICANT CHANGE UP (ref 40–120)
ALT FLD-CCNC: 40 U/L — SIGNIFICANT CHANGE UP (ref 4–41)
ANION GAP SERPL CALC-SCNC: 15 MMO/L — HIGH (ref 7–14)
APPEARANCE UR: CLEAR — SIGNIFICANT CHANGE UP
AST SERPL-CCNC: 24 U/L — SIGNIFICANT CHANGE UP (ref 4–40)
BASE EXCESS BLDV CALC-SCNC: 1.3 MMOL/L — SIGNIFICANT CHANGE UP
BASOPHILS # BLD AUTO: 0.03 K/UL — SIGNIFICANT CHANGE UP (ref 0–0.2)
BASOPHILS NFR BLD AUTO: 0.4 % — SIGNIFICANT CHANGE UP (ref 0–2)
BILIRUB SERPL-MCNC: 0.4 MG/DL — SIGNIFICANT CHANGE UP (ref 0.2–1.2)
BILIRUB UR-MCNC: NEGATIVE — SIGNIFICANT CHANGE UP
BLOOD GAS VENOUS - CREATININE: 0.71 MG/DL — SIGNIFICANT CHANGE UP (ref 0.5–1.3)
BLOOD GAS VENOUS - FIO2: 21 — SIGNIFICANT CHANGE UP
BLOOD UR QL VISUAL: NEGATIVE — SIGNIFICANT CHANGE UP
BUN SERPL-MCNC: 9 MG/DL — SIGNIFICANT CHANGE UP (ref 7–23)
CALCIUM SERPL-MCNC: 9.6 MG/DL — SIGNIFICANT CHANGE UP (ref 8.4–10.5)
CHLORIDE BLDV-SCNC: 108 MMOL/L — SIGNIFICANT CHANGE UP (ref 96–108)
CHLORIDE SERPL-SCNC: 101 MMOL/L — SIGNIFICANT CHANGE UP (ref 98–107)
CO2 SERPL-SCNC: 23 MMOL/L — SIGNIFICANT CHANGE UP (ref 22–31)
COLOR SPEC: SIGNIFICANT CHANGE UP
CREAT SERPL-MCNC: 0.63 MG/DL — SIGNIFICANT CHANGE UP (ref 0.5–1.3)
EOSINOPHIL # BLD AUTO: 0.08 K/UL — SIGNIFICANT CHANGE UP (ref 0–0.5)
EOSINOPHIL NFR BLD AUTO: 0.9 % — SIGNIFICANT CHANGE UP (ref 0–6)
GAS PNL BLDV: 138 MMOL/L — SIGNIFICANT CHANGE UP (ref 136–146)
GLUCOSE BLDV-MCNC: 126 MG/DL — HIGH (ref 70–99)
GLUCOSE SERPL-MCNC: 124 MG/DL — HIGH (ref 70–99)
GLUCOSE UR-MCNC: NEGATIVE — SIGNIFICANT CHANGE UP
HCO3 BLDV-SCNC: 25 MMOL/L — SIGNIFICANT CHANGE UP (ref 20–27)
HCT VFR BLD CALC: 48.6 % — SIGNIFICANT CHANGE UP (ref 39–50)
HCT VFR BLDV CALC: 47.4 % — SIGNIFICANT CHANGE UP (ref 39–51)
HGB BLD-MCNC: 15.1 G/DL — SIGNIFICANT CHANGE UP (ref 13–17)
HGB BLDV-MCNC: 15.5 G/DL — SIGNIFICANT CHANGE UP (ref 13–17)
IMM GRANULOCYTES NFR BLD AUTO: 0.2 % — SIGNIFICANT CHANGE UP (ref 0–1.5)
KETONES UR-MCNC: NEGATIVE — SIGNIFICANT CHANGE UP
LACTATE BLDV-MCNC: 1.6 MMOL/L — SIGNIFICANT CHANGE UP (ref 0.5–2)
LEUKOCYTE ESTERASE UR-ACNC: NEGATIVE — SIGNIFICANT CHANGE UP
LYMPHOCYTES # BLD AUTO: 2.86 K/UL — SIGNIFICANT CHANGE UP (ref 1–3.3)
LYMPHOCYTES # BLD AUTO: 33.6 % — SIGNIFICANT CHANGE UP (ref 13–44)
MCHC RBC-ENTMCNC: 27 PG — SIGNIFICANT CHANGE UP (ref 27–34)
MCHC RBC-ENTMCNC: 31.1 % — LOW (ref 32–36)
MCV RBC AUTO: 86.9 FL — SIGNIFICANT CHANGE UP (ref 80–100)
MONOCYTES # BLD AUTO: 0.78 K/UL — SIGNIFICANT CHANGE UP (ref 0–0.9)
MONOCYTES NFR BLD AUTO: 9.2 % — SIGNIFICANT CHANGE UP (ref 2–14)
NEUTROPHILS # BLD AUTO: 4.74 K/UL — SIGNIFICANT CHANGE UP (ref 1.8–7.4)
NEUTROPHILS NFR BLD AUTO: 55.7 % — SIGNIFICANT CHANGE UP (ref 43–77)
NITRITE UR-MCNC: NEGATIVE — SIGNIFICANT CHANGE UP
NRBC # FLD: 0 K/UL — SIGNIFICANT CHANGE UP (ref 0–0)
PCO2 BLDV: 41 MMHG — SIGNIFICANT CHANGE UP (ref 41–51)
PH BLDV: 7.41 PH — SIGNIFICANT CHANGE UP (ref 7.32–7.43)
PH UR: 7 — SIGNIFICANT CHANGE UP (ref 5–8)
PLATELET # BLD AUTO: 250 K/UL — SIGNIFICANT CHANGE UP (ref 150–400)
PMV BLD: 9.2 FL — SIGNIFICANT CHANGE UP (ref 7–13)
PO2 BLDV: 51 MMHG — HIGH (ref 35–40)
POTASSIUM BLDV-SCNC: 3.7 MMOL/L — SIGNIFICANT CHANGE UP (ref 3.4–4.5)
POTASSIUM SERPL-MCNC: 4 MMOL/L — SIGNIFICANT CHANGE UP (ref 3.5–5.3)
POTASSIUM SERPL-SCNC: 4 MMOL/L — SIGNIFICANT CHANGE UP (ref 3.5–5.3)
PROT SERPL-MCNC: 7.1 G/DL — SIGNIFICANT CHANGE UP (ref 6–8.3)
PROT UR-MCNC: 10 — SIGNIFICANT CHANGE UP
RBC # BLD: 5.59 M/UL — SIGNIFICANT CHANGE UP (ref 4.2–5.8)
RBC # FLD: 12.3 % — SIGNIFICANT CHANGE UP (ref 10.3–14.5)
RBC CASTS # UR COMP ASSIST: SIGNIFICANT CHANGE UP (ref 0–?)
SAO2 % BLDV: 85 % — SIGNIFICANT CHANGE UP (ref 60–85)
SODIUM SERPL-SCNC: 139 MMOL/L — SIGNIFICANT CHANGE UP (ref 135–145)
SP GR SPEC: 1.02 — SIGNIFICANT CHANGE UP (ref 1–1.04)
UROBILINOGEN FLD QL: NORMAL — SIGNIFICANT CHANGE UP
WBC # BLD: 8.51 K/UL — SIGNIFICANT CHANGE UP (ref 3.8–10.5)
WBC # FLD AUTO: 8.51 K/UL — SIGNIFICANT CHANGE UP (ref 3.8–10.5)
WBC UR QL: SIGNIFICANT CHANGE UP (ref 0–?)

## 2020-08-30 PROCEDURE — 71046 X-RAY EXAM CHEST 2 VIEWS: CPT | Mod: 26

## 2020-08-30 RX ORDER — SODIUM CHLORIDE 9 MG/ML
1000 INJECTION INTRAMUSCULAR; INTRAVENOUS; SUBCUTANEOUS ONCE
Refills: 0 | Status: COMPLETED | OUTPATIENT
Start: 2020-08-30 | End: 2020-08-30

## 2020-08-30 RX ADMIN — SODIUM CHLORIDE 1000 MILLILITER(S): 9 INJECTION INTRAMUSCULAR; INTRAVENOUS; SUBCUTANEOUS at 01:19

## 2020-08-30 NOTE — ED ADULT NURSE NOTE - OBJECTIVE STATEMENT
Pt. presents to room 4, A&OX4, ambulatory, and self-care. Pt. states he woke up this morning at 9 am with increased hunger, headache, and lightheadedness. Pt. denies any visual changes, chest pain, SOB, fevers, chills, abdominal pain. Pt. states he took Tylenol at around 6 p.m. that helped with his HA. Respirations even and unlabored. NSR on cardiac monitor. PHx of GERD, hypothyroid, and pituitary tumor. VSS as noted. Comfort measures in place. Awaiting further orders. Will continue to monitor.

## 2020-08-30 NOTE — ED PROVIDER NOTE - NSFOLLOWUPINSTRUCTIONS_ED_ALL_ED_FT
Follow up with your primary doctor this week.    All labs and reports from tonight's visit have been printed and provided in this discharge paper work.    Seek immediate medical attention for new or worsening symptoms.

## 2020-08-30 NOTE — ED PROVIDER NOTE - OBJECTIVE STATEMENT
Dr Hoffman  24yo M hx of hypothyroidism, dx 7-5-20 via MRI a small pituitary microadenoma, hx depression/anxiety from home co feeling weak, lightheaded and "hungry all day " Endorsing generalized weakness today, no focal weakness or numbness. No change in vision no double or blurred vision no slurred speech. no cp or sob no n/v/d no abdominal pain.  Tolerating po, "feeling like I didn't eat"  no hx of DM Denies drug use or ETOH

## 2020-08-30 NOTE — ED PROVIDER NOTE - PHYSICAL EXAMINATION
Dr Dalton OVERTON no facial asymmetry.  supple neck nl ROM  normal S1-S2  No resp distress. able to speak in full and clear sentences. no wheeze, rales or stridor.   soft nontender abdomen. no  rebound. no guarding. no sign of trauma. no CVAT   AOx3, no focal deficits. CN 2-12 grossly intact. nl gait. no meningeal signs.  no pedal edema. no calf tenderness. normal pulses bilateral feet.

## 2020-08-30 NOTE — ED PROVIDER NOTE - PATIENT PORTAL LINK FT
You can access the FollowMyHealth Patient Portal offered by [facility] by registering at the following website: [email or web address]. By joining [employer]’s FollowMyHealth portal, you will also be able to view your health information using other applications (apps) compatible with our system.

## 2020-09-02 ENCOUNTER — TRANSCRIPTION ENCOUNTER (OUTPATIENT)
Age: 25
End: 2020-09-02

## 2020-09-04 ENCOUNTER — RX RENEWAL (OUTPATIENT)
Age: 25
End: 2020-09-04

## 2020-09-30 ENCOUNTER — APPOINTMENT (OUTPATIENT)
Dept: PULMONOLOGY | Facility: CLINIC | Age: 25
End: 2020-09-30
Payer: COMMERCIAL

## 2020-09-30 VITALS
HEART RATE: 99 BPM | OXYGEN SATURATION: 97 % | BODY MASS INDEX: 45.77 KG/M2 | TEMPERATURE: 98.9 F | DIASTOLIC BLOOD PRESSURE: 70 MMHG | RESPIRATION RATE: 17 BRPM | HEIGHT: 69 IN | WEIGHT: 309 LBS | SYSTOLIC BLOOD PRESSURE: 122 MMHG

## 2020-09-30 PROCEDURE — 95012 NITRIC OXIDE EXP GAS DETER: CPT

## 2020-09-30 PROCEDURE — G0008: CPT

## 2020-09-30 PROCEDURE — 99214 OFFICE O/P EST MOD 30 MIN: CPT | Mod: 25

## 2020-09-30 PROCEDURE — 90682 RIV4 VACC RECOMBINANT DNA IM: CPT

## 2020-09-30 NOTE — HISTORY OF PRESENT ILLNESS
[FreeTextEntry1] : Mr. Mathur is a 25 year old male with a history of abnormal PFTs, GERD, low testosterone, STEPHANIE on CPAP, overweight, poor sleep, RLS, and SOB presenting to the office today for a follow up visit. His chief complaint is fatigue. \par -he notes that he sleeps fine but wakes up feeling exhausted. \par -tries to get 10 hours of sleep but he needs 12 hours of sleep. \par -reports dysphagia. \par -he notes that he has gained some weight recently (8 lbs) \par -has been eating well but has not been able to exercise much. \par -he states that he does not think his medication use is causing him to feel fatigued. \par -he reports that he can not fall asleep without the CPAP. \par -denies sleeping randomly. No longer able to fall asleep on the couch. \par \par -He denies any chest pain, chest pressure, diarrhea, constipation, dizziness, sour taste in the mouth, leg swelling, leg pain, itchy eyes, itchy ears, heartburn, reflux.

## 2020-09-30 NOTE — ASSESSMENT
[FreeTextEntry1] : Mr. Mathur is a 25 year old male with a history of SOB, GERD, poor sleep, Tourette's syndrome, asthma, allergy, ADD, sleep apnea, ?RLS, insomnia, obesity, OCD, and anxiety presenting to the office for a follow up pulmonary re-evaluation - residual fatigue, despite 10-12 hours of sleep - +food allergies. Dx of hypogonadism, hypothyroidism, allergies- still fatigued (still) - with issue of pituitary tumor. \par \par His shortness of breath is multifactorial due to:\par -obesity/out of shape\par -poor breathing mechanics\par Less likely\par -?CAD\par -?asthma\par \par problem 1: STEPHANIE\par -continue use of CPAP at pressure of 10, using religiously, tolerating it well, and seeing benefits. \par -off of Modafinil 200 mg QAM due to "crashing" \par -Sleep apnea is associated with adverse clinical consequences which an affect most organ systems.  Cardiovascular disease risk includes arrhythmias, atrial fibrillation, hypertension, coronary artery disease, and stroke. Metabolic disorders include diabetes type 2, non-alcoholic fatty liver disease. Mood disorder especially depression; and cognitive decline especially in the elderly. Associations with  chronic reflux/Leahy’s esophagus some but not all inclusive. \par -Reasons  include arousal consistent with hypopnea; respiratory events most prominent in REM sleep or supine position; therefore sleep staging and body position are important for accurate diagnosis and estimation of AHI. \par \par problem 2: insomnia/poor sleep\par -Recommended to use Insomnitol (take 1st) / Requip .5 mg\par -recommended to use Sleep Guard (2nd, if he wakes up)\par -recommended to use sunglasses 30 minutes before bedtime and to try room darkening window shades\par -Good sleep hygiene was encouraged including avoiding watching television an hour before bed, keeping caffeine at a low,  avoiding reading, television, or anything, in bed, no drinking any liquids three hours before bedtime, and only getting into bed when tired and ready for sleep. \par \par problem 3: RLS\par - He is s/p blood work, which revealed: ferritin level (normal), iron binding level (normal), testosterone level (low), TFT (normal) and vitamin D level (normal)\par - continue  Mirapex 0.5mg QHS\par -Restless Legs Syndrome (RLS), also known as Stewart-Ekbom Disease, is a common sleep -related movement disorder. About 1 in 10 adults in the [place] have problems from restless leg syndrome. It also can be seen in about 2% of children. Women are twice as likely as men to have RLS. People with RLS will have symptoms  most often during times when they are less active, especially at bedtime. RLS most often causes an overwhelming urge to move your legs and sometimes other parts of your body. This urge is associated with unpleasant sensations in different parts of th body. The symptoms can be mild to severe and can affect your ability to go to sleep and stay asleep. People with RLS often sleep less at night and feel more tired during the day. \par \par problem 4: abnormal PFTs-  c/w Asthma \par -MCT c/w asthma\par -confirms sleep apnea - inspiratory limb flattened\par -continue Breo 200 1 inhalation QD\par -continue Ventolin 2 puffs Q6H \par -continue Singulair 10 mg QHS \par \par problem 5: allergy sinus \par -continue Olopatadine 0.6% 1 sniff BID \par - add Clarinex 5 mg QAM\par Environmental measures for allergies were encouraged including mattress and pillow cover, air purifier, and environmental controls \par \par problem 6: residual fatigue\par -off Modafinil 200 mg QAM (5AM) - I-STOP reviewed due to "crashing" \par -endocrinology results f/u \par \par Problem 7: GERD\par - Continue Prilosec before breakfast\par -continue Pepcid 40mg QHS \par - Stoneham Feldman - c/w mild GERD/HH \par \par Things to avoid including overeating, spicy foods, tight clothing, eating within three hours of bed, this list is not all inclusive. \par -Rule of 2s: avoid eating too much, eating too late, eating too spicy, eating two hours before bed\par -For treatment of reflux, possible options discussed including diet control, H2 blockers, PPIs, as well as coating motility agents discussed as treatment options. Timing of meals and proximity of last meal to sleep were discussed. If symptoms persist, a formal gastrointestinal evaluation is needed. \par \par problem 8: low testosterone\par -recommended supplemental Boron 6mg QD\par -Referred to endocrinologist for further evaluation (Mathieu)\par - Referred to urologist (Dave Hoenig)\par \par problem 9: poor breathing mechanics\par -Proper breathing techniques were reviewed with an emphasis of exhalation. Patient instructed to breath in for 1 second and out for four seconds. Patient was encouraged to not talk while walking. \par \par problem 10: obesity/out of shape\par - recommended to see Dr. Silva \par -recommended to increase protein and decrease carbohydrates\par -recommended to reduce caffeine and increase water intake\par -Weight loss, exercise, and diet control were discussed and are highly encouraged. Treatment options were given such as, aqua therapy, and contacting a nutritionist. Recommended to use the elliptical, stationary bike, refrain from use of treadmill. Mindful eating was explained to the patient. Obesity is associated with worsening asthma, shortness of breath, and potential for cardiac disease, diabetes, and other underlying medical conditions.\par \par Problem 11a: COVID-19 precautionary Immune Support Recommendations:\par -OTC Vitamin C 500mg BID \par -OTC Quercetin 250-500mg BID \par -OTC Zinc 75-100mg per day \par -OTC Melatonin 1 or 2mg a night \par -OTC Vitamin D 1-4000mg per day \par -OTC Tonic Water 8oz per day\par -Water 0.5-1 gallon per day\par \par problem 11: health maintenance \par -s/p Influenza vaccine 09/2020. \par -recommended strep pneumonia vaccines: Prevnar-13 vaccine, followed by Pneumo vaccine 23 one year following\par -recommended early intervention for URIs\par -recommended regular osteoporosis evaluations\par -recommended early dermatological evaluations\par -recommended after the age of 50 to the age of 70, colonoscopy every 5 years \par  \par \par Follow up in 4 months \par Patient is encouraged to call with any changes, concerns or questions.

## 2020-09-30 NOTE — PROCEDURE
[FreeTextEntry1] : FENO was 24; a normal value being less than 25\par Fractional exhaled nitric oxide (FENO) is regarded as a simple, noninvasive method for assessing eosinophilic airway inflammation. Produced by a variety of cells within the lung, nitric oxide (NO) concentrations are generally low in healthy individuals. However, high concentrations of NO appear to be involved in nonspecific host defense mechanisms and chronic inflammatory diseases such as asthma. The American Thoracic Society (ATS) therefore has recommended using FENO to aid in the diagnosis and monitoring of eosinophilic airway inflammation and asthma, and for identifying steroid responsive individuals whose chronic respiratory symptoms may be caused by airway inflammation.

## 2020-09-30 NOTE — PHYSICAL EXAM
[No Acute Distress] : no acute distress [Normal Oropharynx] : normal oropharynx [Normal Appearance] : normal appearance [No Neck Mass] : no neck mass [Normal Rate/Rhythm] : normal rate/rhythm [Normal S1, S2] : normal s1, s2 [No Murmurs] : no murmurs [No Resp Distress] : no resp distress [Clear to Auscultation Bilaterally] : clear to auscultation bilaterally [No Abnormalities] : no abnormalities [Benign] : benign [Normal Gait] : normal gait [No Clubbing] : no clubbing [No Cyanosis] : no cyanosis [No Edema] : no edema [FROM] : FROM [Normal Color/ Pigmentation] : normal color/ pigmentation [No Focal Deficits] : no focal deficits [Oriented x3] : oriented x3 [Normal Affect] : normal affect [III] : Mallampati Class: III [TextBox_68] : I:E 1:3, clear

## 2020-09-30 NOTE — ADDENDUM
[FreeTextEntry1] : Documented by Aram Burgess acting as a scribe for Dr. Brant Marin on 09/30/2020 \par \par All medical record entries made by the Scribe were at my, Dr. Brant Marin's, direction and personally dictated by me on 09/30/2020 . I have reviewed the chart and agree that the record accurately reflects my personal performance of the history, physical exam, assessment and plan. I have also personally directed, reviewed, and agree with the discharge instructions. \par \par \par

## 2020-10-12 ENCOUNTER — APPOINTMENT (OUTPATIENT)
Dept: ENDOCRINOLOGY | Facility: CLINIC | Age: 25
End: 2020-10-12
Payer: COMMERCIAL

## 2020-10-12 VITALS
TEMPERATURE: 97.9 F | OXYGEN SATURATION: 98 % | HEIGHT: 69 IN | BODY MASS INDEX: 46.21 KG/M2 | RESPIRATION RATE: 16 BRPM | WEIGHT: 312 LBS | DIASTOLIC BLOOD PRESSURE: 60 MMHG | SYSTOLIC BLOOD PRESSURE: 120 MMHG | HEART RATE: 84 BPM

## 2020-10-12 LAB
ALBUMIN SERPL ELPH-MCNC: 4.4 G/DL
ALP BLD-CCNC: 78 U/L
ALT SERPL-CCNC: 44 U/L
ANION GAP SERPL CALC-SCNC: 15 MMOL/L
AST SERPL-CCNC: 23 U/L
BASOPHILS # BLD AUTO: 0.02 K/UL
BASOPHILS NFR BLD AUTO: 0.3 %
BILIRUB DIRECT SERPL-MCNC: 0.2 MG/DL
BILIRUB INDIRECT SERPL-MCNC: 0.5 MG/DL
BILIRUB SERPL-MCNC: 0.7 MG/DL
BUN SERPL-MCNC: 10 MG/DL
CALCIUM SERPL-MCNC: 9.6 MG/DL
CHLORIDE SERPL-SCNC: 101 MMOL/L
CO2 SERPL-SCNC: 23 MMOL/L
CREAT SERPL-MCNC: 0.68 MG/DL
EOSINOPHIL # BLD AUTO: 0.1 K/UL
EOSINOPHIL NFR BLD AUTO: 1.3 %
ESTRADIOL SERPL-MCNC: 26 PG/ML
FSH SERPL-MCNC: 0.9 IU/L
GLUCOSE SERPL-MCNC: 114 MG/DL
HCT VFR BLD CALC: 50.2 %
HGB BLD-MCNC: 15.9 G/DL
IMM GRANULOCYTES NFR BLD AUTO: 0.3 %
LH SERPL-ACNC: 3.7 IU/L
LYMPHOCYTES # BLD AUTO: 2.51 K/UL
LYMPHOCYTES NFR BLD AUTO: 33.2 %
MAN DIFF?: NORMAL
MCHC RBC-ENTMCNC: 27.6 PG
MCHC RBC-ENTMCNC: 31.7 GM/DL
MCV RBC AUTO: 87 FL
MONOCYTES # BLD AUTO: 0.64 K/UL
MONOCYTES NFR BLD AUTO: 8.5 %
NEUTROPHILS # BLD AUTO: 4.26 K/UL
NEUTROPHILS NFR BLD AUTO: 56.4 %
OSMOLALITY SERPL: 288 MOSMOL/KG
OSMOLALITY UR: 820 MOSM/KG
PLATELET # BLD AUTO: 265 K/UL
POTASSIUM SERPL-SCNC: 4.2 MMOL/L
PROT SERPL-MCNC: 7 G/DL
PSA SERPL-MCNC: 0.42 NG/ML
RBC # BLD: 5.77 M/UL
RBC # FLD: 12.5 %
SODIUM SERPL-SCNC: 139 MMOL/L
T4 FREE SERPL-MCNC: 1.4 NG/DL
TSH SERPL-ACNC: 2.19 UIU/ML
WBC # FLD AUTO: 7.55 K/UL

## 2020-10-12 PROCEDURE — 36415 COLL VENOUS BLD VENIPUNCTURE: CPT

## 2020-10-12 PROCEDURE — 99215 OFFICE O/P EST HI 40 MIN: CPT | Mod: 25

## 2020-10-12 NOTE — ASSESSMENT
[FreeTextEntry1] : - repeat testo, IGF-1,BMP, urine and serum osm\par - r/o CDI given his clinical presentation and microadenoma in the posterior pituitary\par - since off haldol, might consider dostinex and TRT\par - R+B of testosterone replacement reviewed in details, including worsening of STEPHANIE and potential adverse effects on CV system, effect on hematocrit, PSA, and liver functions, as well as decrease in sperm count.\par - I reviewed with the patient topical vs injectable vs implantable testosterone preparations, as well as proper use/applications/precautions and monitoring.\par - I advised that the successful weight loss will likely to improve testosterone levels, as well as sleep apnea, blood pressure and sugar, and a cholesterol.\par - repeat  pituitary MRI in 01/21\par - monitor thyroid levels. Continue L-thyroxine 50 mcg\par - 1mg ONDST\par I've advised extensive nutritional education program, including weight loss teaching and evaluation. Proper dietary restrictions and exercise routines discussed. He unfortunately failed several weight loss programs including Weight Watchers.Medical weight loss therapies were reviewed with the patient. Bariatric options discussed again. \par RTC post labs

## 2020-10-12 NOTE — HISTORY OF PRESENT ILLNESS
[FreeTextEntry1] : 25 year  Male f/u for the evaluation of his chronic fatigue, low testosterone levels and hypothyroidism management. \par \par *** Oct 12, 2020 ***\par \par off Haldol for about 2 weeks. now, on Abilify 5 mg. did not start dostinex b/o was on haldol at that time\par 2hr OGTT- GH < 0.03, glucose- 105\par \par states that had an increased thirst within past year, drinking about a gallon of cold water a day. Urinates at least 10 times a day, but no nocturia. no pit issues in the family. 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 He's been treated with CPAP which helped with sleeping, but his fatigue did not improve much. He was told that his testosterone was low in the past, but he did not take any testosterone supplements. He was also diagnosed with hypothyroidism about 2 years ago, and he's been on a stable dose of L-thyroxine 50 mcg qd.\par He denies ED and his spontaneous am erections are more or less preserved. However, his libido has decreased a lot. Shaving is well preserved. He is not in relationship right now; masturbates once every other week because of the low desire. Denies visual disturbances or headaches, dizziness, nausea, vomiting, abdominal pain,  breast discharge, worsening in peripheral vision. \par He underwent puberty at a normal rate compared with his peers. No history of learning disabilities or behavioral disorders. No history of insults to the brain or testes, including surgery, trauma, tumors, or radiation exposure. \par No history of diabetes, HIV, liver disease, or kidney disease. No history of medication use including anabolic steroids, glucocorticoids, chronic pain medications, or history of chemotherapy. His sense of smell is intact. \par Family history is negative for infertility problems and low testosterone.\par \par

## 2020-10-13 ENCOUNTER — TRANSCRIPTION ENCOUNTER (OUTPATIENT)
Age: 25
End: 2020-10-13

## 2020-10-13 LAB
GH SERPL-MCNC: <0.03 NG/ML
IGF-1 INTERP: NORMAL
IGF-I BLD-MCNC: 267 NG/ML
PROLACTIN SERPL-MCNC: 5.9 NG/ML
PROLACTIN SERPL-MCNC: 6.7 NG/ML

## 2020-10-14 ENCOUNTER — TRANSCRIPTION ENCOUNTER (OUTPATIENT)
Age: 25
End: 2020-10-14

## 2020-10-14 LAB — SHBG SERPL-SCNC: 11 NMOL/L

## 2020-10-19 ENCOUNTER — TRANSCRIPTION ENCOUNTER (OUTPATIENT)
Age: 25
End: 2020-10-19

## 2020-10-19 LAB
ALPHA SUBUNIT SERPL-MCNC: 0.24 NG/ML
TESTOST BND SERPL-MCNC: 9.5 PG/ML
TESTOST SERPL-MCNC: 235.6 NG/DL

## 2020-10-21 LAB
MONOMERIC PROLACTIN (ICMA)*: 5.7 NG/ML
PERCENT MACROPROLACTIN: 11 %
PROLACTIN, SERUM (ICMA)*: 6.4 NG/ML

## 2020-11-02 ENCOUNTER — RX RENEWAL (OUTPATIENT)
Age: 25
End: 2020-11-02

## 2020-11-09 ENCOUNTER — TRANSCRIPTION ENCOUNTER (OUTPATIENT)
Age: 25
End: 2020-11-09

## 2020-11-18 ENCOUNTER — TRANSCRIPTION ENCOUNTER (OUTPATIENT)
Age: 25
End: 2020-11-18

## 2020-12-15 ENCOUNTER — RX RENEWAL (OUTPATIENT)
Age: 25
End: 2020-12-15

## 2020-12-22 ENCOUNTER — APPOINTMENT (OUTPATIENT)
Dept: FAMILY MEDICINE | Facility: CLINIC | Age: 25
End: 2020-12-22

## 2021-01-03 ENCOUNTER — TRANSCRIPTION ENCOUNTER (OUTPATIENT)
Age: 26
End: 2021-01-03

## 2021-01-08 DIAGNOSIS — Z01.812 ENCOUNTER FOR PREPROCEDURAL LABORATORY EXAMINATION: ICD-10-CM

## 2021-01-13 ENCOUNTER — APPOINTMENT (OUTPATIENT)
Dept: ENDOCRINOLOGY | Facility: CLINIC | Age: 26
End: 2021-01-13
Payer: COMMERCIAL

## 2021-01-13 PROCEDURE — 99213 OFFICE O/P EST LOW 20 MIN: CPT | Mod: 95

## 2021-01-13 NOTE — ASSESSMENT
[FreeTextEntry1] : - repeat testo, CBC, liver functions, PSA, thyroid panel\par - cont Axiron 2 depressions daily and L-thyroxine 50 mcg, pending labs\par - r/o CDI given his clinical presentation and microadenoma in the posterior pituitary. monitor urine lytes/ serum/urine osm\par - since off haldol, might consider dostinex \par - repeat  pituitary MRI in 01/21\par - monitor thyroid levels. Continue \par I've advised extensive nutritional education program, including weight loss teaching and evaluation. Proper dietary restrictions and exercise routines discussed. He unfortunately failed several weight loss programs including Weight Watchers.Medical weight loss therapies were reviewed with the patient. Bariatric options discussed again. \par RTC 3 mos

## 2021-01-13 NOTE — HISTORY OF PRESENT ILLNESS
[Home] : at home, [unfilled] , at the time of the visit. [Medical Office: ([facility])___] : at the medical office located in  [Verbal consent obtained from patient] : the patient, [unfilled] [FreeTextEntry1] : 25 year  Male f/u for the evaluation of his chronic fatigue, low testosterone levels and hypothyroidism management. \par \par *** Televisit  Jan 13, 2021 ***\par \par under stress. Father with mts brain cancer, in the hospice now.\par otherwise , feels a lot better since starting TRT. More energetic, no need to take daytime naps anymore. Libido is still on a lower side, but attributes to his stress.\par currently on Axiron 2 depressions daily, L-thyroxine 50 mcg\par no recent labs\par \par \par *** Oct 12, 2020 ***\par \par off Haldol for about 2 weeks. now, on Abilify 5 mg. did not start dostinex b/o was on haldol at that time\par 2hr OGTT- GH < 0.03, glucose- 105\par \par states that had an increased thirst within past year, drinking about a gallon of cold water a day. Urinates at least 10 times a day, but no nocturia. no pit issues in the family. 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 He's been treated with CPAP which helped with sleeping, but his fatigue did not improve much. He was told that his testosterone was low in the past, but he did not take any testosterone supplements. He was also diagnosed with hypothyroidism about 2 years ago, and he's been on a stable dose of L-thyroxine 50 mcg qd.\par He denies ED and his spontaneous am erections are more or less preserved. However, his libido has decreased a lot. Shaving is well preserved. He is not in relationship right now; masturbates once every other week because of the low desire. Denies visual disturbances or headaches, dizziness, nausea, vomiting, abdominal pain,  breast discharge, worsening in peripheral vision. \par He underwent puberty at a normal rate compared with his peers. No history of learning disabilities or behavioral disorders. No history of insults to the brain or testes, including surgery, trauma, tumors, or radiation exposure. \par No history of diabetes, HIV, liver disease, or kidney disease. No history of medication use including anabolic steroids, glucocorticoids, chronic pain medications, or history of chemotherapy. His sense of smell is intact. \par Family history is negative for infertility problems and low testosterone.\par \par

## 2021-02-01 ENCOUNTER — APPOINTMENT (OUTPATIENT)
Dept: PULMONOLOGY | Facility: CLINIC | Age: 26
End: 2021-02-01

## 2021-02-03 ENCOUNTER — RX RENEWAL (OUTPATIENT)
Age: 26
End: 2021-02-03

## 2021-02-04 ENCOUNTER — APPOINTMENT (OUTPATIENT)
Dept: PULMONOLOGY | Facility: CLINIC | Age: 26
End: 2021-02-04
Payer: COMMERCIAL

## 2021-02-04 VITALS — BODY MASS INDEX: 43.81 KG/M2 | HEIGHT: 70 IN | WEIGHT: 306 LBS

## 2021-02-04 PROCEDURE — 99214 OFFICE O/P EST MOD 30 MIN: CPT | Mod: 95

## 2021-02-04 RX ORDER — ARIPIPRAZOLE 5 MG/1
5 TABLET ORAL
Qty: 90 | Refills: 0 | Status: ACTIVE | COMMUNITY
Start: 2020-11-16

## 2021-02-04 NOTE — REASON FOR VISIT
[Follow-Up] : a follow-up visit [FreeTextEntry1] : video call - abnormal PFTs, GERD, low testosterone, STEPHANIE on CPAP, overweight, poor sleep, RLS, and SOB

## 2021-02-04 NOTE — REVIEW OF SYSTEMS
[Negative] : Endocrine [Fatigue] : no fatigue [EDS] : no eds [Nasal Congestion] : no nasal congestion [Sinus Problems] : no sinus problems [Chest Discomfort] : no chest discomfort [GERD] : no gerd [Diarrhea] : no diarrhea [Constipation] : no constipation [Dysphagia] : no dysphagia [Dizziness] : no dizziness

## 2021-02-04 NOTE — ADDENDUM
[FreeTextEntry1] : Documented by Julio Henry acting as a scribe for Dr. Brant Marin on 02/04/2021.\par \par All medical record entries made by the Scribe were at my, Dr. Brant Marin's, direction and personally dictated by me on 02/04/2021. I have reviewed the chart and agree that the record accurately reflects my personal performance of the history, physical exam, assessment and plan. I have also personally directed, reviewed, and agree with the discharge instructions. \par

## 2021-02-04 NOTE — ASSESSMENT
[FreeTextEntry1] : Mr. Mathur is a 25 year old male with a history of SOB, GERD, poor sleep, Tourette's syndrome, asthma, allergy, ADD, sleep apnea, ?RLS, insomnia, obesity, OCD, and anxiety presenting to the office via video call for a follow up pulmonary re-evaluation - +food allergies. Dx of hypogonadism, hypothyroidism, pituitary tumor, allergies- still fatigued, but better on testosterone Rx\par \par His shortness of breath is multifactorial due to:\par -obesity/out of shape\par -poor breathing mechanics\par Less likely\par -?CAD\par -?asthma\par \par problem 1: STEPHANIE\par -continue use of CPAP at pressure of 10, using religiously, tolerating it well, and seeing benefits. \par -off of Modafinil 200 mg QAM due to "crashing" \par -Sleep apnea is associated with adverse clinical consequences which an affect most organ systems.  Cardiovascular disease risk includes arrhythmias, atrial fibrillation, hypertension, coronary artery disease, and stroke. Metabolic disorders include diabetes type 2, non-alcoholic fatty liver disease. Mood disorder especially depression; and cognitive decline especially in the elderly. Associations with  chronic reflux/Leahy’s esophagus some but not all inclusive. \par -Reasons  include arousal consistent with hypopnea; respiratory events most prominent in REM sleep or supine position; therefore sleep staging and body position are important for accurate diagnosis and estimation of AHI. \par \par problem 2: insomnia/poor sleep\par -Recommended to use Insomnitol (take 1st) / Requip .5 mg\par -recommended to use Sleep Guard (2nd, if he wakes up)\par -recommended to use sunglasses 30 minutes before bedtime and to try room darkening window shades\par -Good sleep hygiene was encouraged including avoiding watching television an hour before bed, keeping caffeine at a low,  avoiding reading, television, or anything, in bed, no drinking any liquids three hours before bedtime, and only getting into bed when tired and ready for sleep. \par \par problem 3: RLS\par - He is s/p blood work, which revealed: ferritin level (normal), iron binding level (normal), testosterone level (low), TFT (normal) and vitamin D level (normal)\par - continue  Mirapex 0.5mg QHS\par -Restless Legs Syndrome (RLS), also known as Stewart-Ekbom Disease, is a common sleep -related movement disorder. About 1 in 10 adults in the [place] have problems from restless leg syndrome. It also can be seen in about 2% of children. Women are twice as likely as men to have RLS. People with RLS will have symptoms  most often during times when they are less active, especially at bedtime. RLS most often causes an overwhelming urge to move your legs and sometimes other parts of your body. This urge is associated with unpleasant sensations in different parts of th body. The symptoms can be mild to severe and can affect your ability to go to sleep and stay asleep. People with RLS often sleep less at night and feel more tired during the day. \par \par problem 4: abnormal PFTs-  c/w Asthma \par -MCT c/w asthma\par -confirms sleep apnea - inspiratory limb flattened\par -continue Breo 200 1 inhalation QD\par -continue Ventolin 2 puffs Q6H \par -continue Singulair 10 mg QHS \par \par problem 5: allergy sinus \par -continue Olopatadine 0.6% 1 sniff BID \par - add Clarinex 5 mg QAM\par Environmental measures for allergies were encouraged including mattress and pillow cover, air purifier, and environmental controls \par \par problem 6: residual fatigue\par -off Modafinil 200 mg QAM (5AM) - I-STOP reviewed due to "crashing" \par -endocrinology results f/u \par \par Problem 7: GERD\par - Continue Prilosec 20 mg QAM before breakfast\par -continue Pepcid 40mg QHS \par - [place] Feldman - c/w mild GERD/HH \par \par Things to avoid including overeating, spicy foods, tight clothing, eating within three hours of bed, this list is not all inclusive. \par -Rule of 2s: avoid eating too much, eating too late, eating too spicy, eating two hours before bed\par -For treatment of reflux, possible options discussed including diet control, H2 blockers, PPIs, as well as coating motility agents discussed as treatment options. Timing of meals and proximity of last meal to sleep were discussed. If symptoms persist, a formal gastrointestinal evaluation is needed. \par \par problem 8: low testosterone\par -recommended supplemental Boron 6mg QD\par -continue on testosterone Rx (Abelev)\par -Referred to endocrinologist for further evaluation (Absteph)\par - Referred to urologist (Dave Hoenig)\par \par problem 9: poor breathing mechanics\par -Proper breathing techniques were reviewed with an emphasis of exhalation. Patient instructed to breath in for 1 second and out for four seconds. Patient was encouraged to not talk while walking. \par \par problem 10: obesity/out of shape\par - recommended to see Dr. Silva \par -recommended to increase protein and decrease carbohydrates\par -recommended to reduce caffeine and increase water intake\par -Weight loss, exercise, and diet control were discussed and are highly encouraged. Treatment options were given such as, aqua therapy, and contacting a nutritionist. Recommended to use the elliptical, stationary bike, refrain from use of treadmill. Mindful eating was explained to the patient. Obesity is associated with worsening asthma, shortness of breath, and potential for cardiac disease, diabetes, and other underlying medical conditions.\par \par Problem 11a: COVID-19 precautionary Immune Support Recommendations:\par -OTC Vitamin C 500mg BID \par -OTC Quercetin 250-500mg BID \par -OTC Zinc 75-100mg per day \par -OTC Melatonin 1 or 2mg a night \par -OTC Vitamin D 1-4000mg per day \par -OTC Tonic Water 8oz per day\par -Water 0.5-1 gallon per day\par \par problem 11: health maintenance \par -s/p Influenza vaccine 09/2020. \par -recommended strep pneumonia vaccines: Prevnar-13 vaccine, followed by Pneumo vaccine 23 one year following\par -recommended early intervention for URIs\par -recommended regular osteoporosis evaluations\par -recommended early dermatological evaluations\par -recommended after the age of 50 to the age of 70, colonoscopy every 5 years \par  \par \par Follow up in 4 months \par Patient is encouraged to call with any changes, concerns or questions.

## 2021-02-04 NOTE — HISTORY OF PRESENT ILLNESS
[Home] : at home, [unfilled] , at the time of the visit. [Medical Office: ([facility])___] : at the medical office located in  [Verbal consent obtained from patient] : the patient, [unfilled] [FreeTextEntry1] : Mr. Mathur is a 25 year old male with a history of abnormal PFTs, GERD, low testosterone, STEPHANIE on CPAP, overweight, poor sleep, RLS, and SOB presenting to the office via video call today for a follow up visit. His chief complaint is dealing with his father's passing\par -he notes he is mourning the loss of his father currently\par -he states his sleep quality has improved.\par -he has been taking testosterone (patch under each arm QD) for 3 months now, given by his endo, and it has increased his energy level. His energy level is now 6-7/10 rather than 3/10\par -he reports his weight is down slightly as he now has the time to exercise\par -he reports he has a pituitary tumor and is due for a 6 month follow up MRI\par -he notes he uses his CPAP nightly and now needs to sleep fewer hours than before. He previously needed 12 hours of sleep, and now sleeps 8-9 hours per night. He has been sleeping more over the past few weeks due to depression from his father's passing\par -he reports he sweats often at night\par -he reports his sinuses are clear\par -he states he is able to fall asleep more quickly and easily than usual, but occasionally wakes up during the night and having a hard time falling back to sleep over the past few weeks\par -he has been using his sleep medications and supplements\par -he started taking clarinex, and now doesn’t need his nasal spray\par -he denies any chest pain, chest pressure, diarrhea, constipation, dysphagia, dizziness, sour taste in the mouth, heartburn, reflux

## 2021-02-08 ENCOUNTER — TRANSCRIPTION ENCOUNTER (OUTPATIENT)
Age: 26
End: 2021-02-08

## 2021-02-24 ENCOUNTER — TRANSCRIPTION ENCOUNTER (OUTPATIENT)
Age: 26
End: 2021-02-24

## 2021-02-27 LAB
ALBUMIN SERPL ELPH-MCNC: 4.1 G/DL
ALP BLD-CCNC: 76 U/L
ALT SERPL-CCNC: 45 U/L
AST SERPL-CCNC: 24 U/L
BASOPHILS # BLD AUTO: 0.04 K/UL
BASOPHILS NFR BLD AUTO: 0.4 %
BILIRUB DIRECT SERPL-MCNC: 0.1 MG/DL
BILIRUB INDIRECT SERPL-MCNC: 0.3 MG/DL
BILIRUB SERPL-MCNC: 0.4 MG/DL
EOSINOPHIL # BLD AUTO: 0.11 K/UL
EOSINOPHIL NFR BLD AUTO: 1.2 %
HCT VFR BLD CALC: 49.5 %
HGB BLD-MCNC: 15.5 G/DL
IMM GRANULOCYTES NFR BLD AUTO: 0.3 %
LYMPHOCYTES # BLD AUTO: 2.7 K/UL
LYMPHOCYTES NFR BLD AUTO: 28.9 %
MAN DIFF?: NORMAL
MCHC RBC-ENTMCNC: 26.8 PG
MCHC RBC-ENTMCNC: 31.3 GM/DL
MCV RBC AUTO: 85.6 FL
MONOCYTES # BLD AUTO: 0.9 K/UL
MONOCYTES NFR BLD AUTO: 9.6 %
NEUTROPHILS # BLD AUTO: 5.56 K/UL
NEUTROPHILS NFR BLD AUTO: 59.6 %
PLATELET # BLD AUTO: 283 K/UL
PROT SERPL-MCNC: 6.7 G/DL
PSA SERPL-MCNC: 0.41 NG/ML
RBC # BLD: 5.78 M/UL
RBC # FLD: 13 %
T4 FREE SERPL-MCNC: 1.1 NG/DL
TSH SERPL-ACNC: 1.7 UIU/ML
WBC # FLD AUTO: 9.34 K/UL

## 2021-02-28 ENCOUNTER — OUTPATIENT (OUTPATIENT)
Dept: OUTPATIENT SERVICES | Facility: HOSPITAL | Age: 26
LOS: 1 days | End: 2021-02-28
Payer: COMMERCIAL

## 2021-02-28 ENCOUNTER — RESULT REVIEW (OUTPATIENT)
Age: 26
End: 2021-02-28

## 2021-02-28 ENCOUNTER — APPOINTMENT (OUTPATIENT)
Dept: MRI IMAGING | Facility: CLINIC | Age: 26
End: 2021-02-28
Payer: COMMERCIAL

## 2021-02-28 DIAGNOSIS — Z00.8 ENCOUNTER FOR OTHER GENERAL EXAMINATION: ICD-10-CM

## 2021-02-28 PROCEDURE — A9585: CPT

## 2021-02-28 PROCEDURE — 70553 MRI BRAIN STEM W/O & W/DYE: CPT

## 2021-02-28 PROCEDURE — 70553 MRI BRAIN STEM W/O & W/DYE: CPT | Mod: 26

## 2021-03-01 LAB — SHBG SERPL-SCNC: 10.4 NMOL/L

## 2021-03-02 ENCOUNTER — TRANSCRIPTION ENCOUNTER (OUTPATIENT)
Age: 26
End: 2021-03-02

## 2021-03-03 ENCOUNTER — NON-APPOINTMENT (OUTPATIENT)
Age: 26
End: 2021-03-03

## 2021-03-03 LAB
TESTOST BND SERPL-MCNC: 7.8 PG/ML
TESTOST SERPL-MCNC: 241 NG/DL

## 2021-03-09 ENCOUNTER — TRANSCRIPTION ENCOUNTER (OUTPATIENT)
Age: 26
End: 2021-03-09

## 2021-03-12 ENCOUNTER — APPOINTMENT (OUTPATIENT)
Dept: PULMONOLOGY | Facility: CLINIC | Age: 26
End: 2021-03-12
Payer: COMMERCIAL

## 2021-03-12 VITALS
DIASTOLIC BLOOD PRESSURE: 70 MMHG | OXYGEN SATURATION: 98 % | BODY MASS INDEX: 45.1 KG/M2 | HEIGHT: 70 IN | WEIGHT: 315 LBS | TEMPERATURE: 97.3 F | SYSTOLIC BLOOD PRESSURE: 120 MMHG | HEART RATE: 93 BPM | RESPIRATION RATE: 16 BRPM

## 2021-03-12 PROCEDURE — 99214 OFFICE O/P EST MOD 30 MIN: CPT

## 2021-03-12 PROCEDURE — 99072 ADDL SUPL MATRL&STAF TM PHE: CPT

## 2021-03-12 NOTE — HISTORY OF PRESENT ILLNESS
[Obstructive Sleep Apnea] : obstructive sleep apnea [Awakes Unrefreshed] : awakes unrefreshed [Awakes with Dry Mouth] : awakes with dry mouth [Daytime Somnolence] : daytime somnolence [Difficulty Maintaining Sleep] : difficulty maintaining sleep [Frequent Nocturnal Awakening] : frequent nocturnal awakening [Nonrestorative Sleep] : nonrestorative sleep [TextBox_4] : Mr. Mathur is a 26 year old male with a history of abnormal PFTs, GERD, low testosterone, STEPHANIE on CPAP, overweight, poor sleep, RLS, and SOB presenting to the office for a follow up visit. \par \par His chief concern is obtaining new PAP device because his presented with an Error message on Tuesday morning. \par \par Patient states he has been on CPAP therapy x 7-8 years.  He notes he was RX'ed the device from Dr. Youngblood of [place] via [facility].  He states they have since been bought out by [employer].  Patient admits to snoring before he was on the PAP.  He notes he remembers being told that his apnea number wasn't too bad, but he was told he snored a lot.  He states he cannot sleep without his CPAP device. He states he uses a FFM, size medium.  His current device is the Resmed Airsense and he believes his current pressure is set at 10 CMH2O. \par \par Patient admits to going to sleep between 10 P-12A  and awakens 7A on weekdays and 11A on weekends.  Pt states better quality sleep with device in use, but nonrestorative sleep and EDS persists. He feels that 12 hr sleep will leave him feeling restored upon awakening. He notes he does awaken with dry mouth.  He states he has issues falling asleep and sometimes staying asleep.  He is unsure what awakens him, but he rolls over and falls back to sleep.  He is unsure how frequently that occurs. \par \par Patient states he is currently taking montelukast & mirapex.  Patient not on daily maintenance inhaler noting since he started Montelukast he has experienced no breathing issues. He states he received his first dose of Pfizer Covid-19 vaccination on 3/3 & is due 3/24 for his 2nd dose.  Patient did admit to feeling as though he needed a rescue inhaler post 1st injection.  He states he was told not to take it from the provider at the [place] where he received his vaccination.  \par \par Patient denies awakening with headache, sleepy driving, witnessed apneas, awakening gasping/choking \par Patient denies fever/chills, decreased appetite, SOB @ rest or exertion, wheezing, chest tightness, or any other issues at this time.  [Tired while Driving] : not tired while driving

## 2021-03-12 NOTE — DISCUSSION/SUMMARY
[FreeTextEntry1] : Discussed re-diagnostic RX needed.  Patient admits he would feel more comfortable at home.\par \par Explained after that test next steps are:\par 1. If I can obtain data from current device and [id number] is treating him, then will RX new device post HST with current pressure setting. Explained our office will reach out to [facility], but quickest option would be to drop his SD Card off for review. \par \par 2. If current setting is not treating him, will need to have in lab PAP titraiton.

## 2021-03-12 NOTE — ASSESSMENT
[FreeTextEntry1] : Mr. Mathur is a 26 year old male with a history of SOB, GERD, poor sleep, Tourette's syndrome, asthma, allergy, ADD, sleep apnea, ?RLS, insomnia, obesity, OCD, and anxiety presenting to the office via video call for a follow up pulmonary re-evaluation - +food allergies. Dx of hypogonadism, hypothyroidism, pituitary tumor, allergies- still fatigued, but better on testosterone Rx.\par \par 1. Asthma:\par - Continue Albuterol 2 puffs Q4-6H PRN; advised to use post 2nd injection if symptomatic.\par - Continue Montelukast 10 mg PO QHS. \par \par 2. STEPHANIE: \par - I have discussed all the negative health consequences associated with obstructive and central sleep apnea including heart conditions/MI, hypertension, diabetes, chronic inflammation, memory issues, stroke, obesity, decreased libido, sleep related accidents, as well as anxiety and depression. Additional recommendations included: Avoid alcohol and sedatives at bedtime. Proper sleep hygiene such as maintaining a regular sleep routine, avoiding naps if possible, not watching TV or reading in bed,  and maintaining a quiet, comfortable bedroom. Sleepy driving avoidance and risks discussed with patient. Diet, exercise and weight loss suggested.\par - RX for [place] HST to repeat study.\par - Will try to obtain access to his current Resmed Airsense device; explained our office will reach out to [employer] [facility], but quickest option would be to drop his SD Card off for review. \par \par Patient to follow up with Dr. Marin as scheduled.\par Patient to call with further questions and concerns.\par Patient verbalizes understanding of care plan and is agreeable.\par \par \par \par

## 2021-04-02 ENCOUNTER — TRANSCRIPTION ENCOUNTER (OUTPATIENT)
Age: 26
End: 2021-04-02

## 2021-04-07 ENCOUNTER — APPOINTMENT (OUTPATIENT)
Dept: PULMONOLOGY | Facility: CLINIC | Age: 26
End: 2021-04-07
Payer: COMMERCIAL

## 2021-04-07 ENCOUNTER — TRANSCRIPTION ENCOUNTER (OUTPATIENT)
Age: 26
End: 2021-04-07

## 2021-04-07 VITALS — HEIGHT: 70 IN | WEIGHT: 315 LBS | BODY MASS INDEX: 45.1 KG/M2

## 2021-04-07 PROCEDURE — 99214 OFFICE O/P EST MOD 30 MIN: CPT | Mod: 95

## 2021-04-07 NOTE — PHYSICAL EXAM
[No Acute Distress] : no acute distress [Well Nourished] : well nourished [No Deformities] : no deformities [No Resp Distress] : no resp distress [Normal Color/ Pigmentation] : normal color/ pigmentation [No Focal Deficits] : no focal deficits [Oriented x3] : oriented x3 [Normal Mood] : normal mood [Normal Affect] : normal affect [TextBox_11] : Unable to evaluate d/t video visit.  [TextBox_44] : Unable to evaluate d/t video visit.  [TextBox_54] : Unable to evaluate d/t video visit.  [TextBox_80] : Unable to evaluate d/t video visit.  [TextBox_68] : Unable to evaluate d/t video visit.  [TextBox_99] : Unable to evaluate d/t video visit.  [TextBox_105] : Unable to evaluate d/t video visit.

## 2021-04-07 NOTE — REASON FOR VISIT
[Home] : at home, [unfilled] , at the time of the visit. [Medical Office: ([facility])___] : at the medical office located in  [Verbal consent obtained from patient] : the patient, [unfilled] [Follow-Up] : a follow-up visit [Sleep Apnea] : sleep apnea

## 2021-04-07 NOTE — HISTORY OF PRESENT ILLNESS
[TextBox_4] : Mr. Mathur is a 26 year old male with a history of abnormal PFTs, GERD, low testosterone, STEPHANIE on CPAP, overweight, poor sleep, RLS, and SOB presenting to the office for a follow up visit. \par \par His chief concern is obtaining new PAP device. \par \par 3/12/21 Visit:\par Patient states he has been on CPAP therapy x 7-8 years. He notes he was RX'ed the device from Dr. Youngblood of [place] via [facility]. He states they have since been bought out by [employer]. Patient admits to snoring before he was on the PAP. He notes he remembers being told that his apnea number wasn't too bad, but he was told he snored a lot. He states he cannot sleep without his CPAP device. He states he uses a FFM, size medium. His current device is the Resmed Airsense and he believes his current pressure is set at 10 CMH2O. \par \par Patient admits to going to sleep between 10 P-12A and awakens 7A on weekdays and 11A on weekends. Pt states better quality sleep with device in use, but nonrestorative sleep and EDS persists. He feels that 12 hr sleep will leave him feeling restored upon awakening. He notes he does awaken with dry mouth. He states he has issues falling asleep and sometimes staying asleep. He is unsure what awakens him, but he rolls over and falls back to sleep. He is unsure how frequently that occurs. \par \par Patient states he is currently taking montelukast & mirapex. Patient not on daily maintenance inhaler noting since he started Montelukast he has experienced no breathing issues. \par \par 4/7/21 UPDATE:\par Patient s/p re-diagnostic HST (results below). Patient states his device has continued to show an error message every time he turns the device on.  Patient states he received his 2nd dose of Pfizer Covid-19 Vaccination on 3/24/21. \par  \par Patient denies awakening with headache, sleepy driving, witnessed apneas, awakening gasping/choking \par Patient denies fever/chills, decreased appetite, SOB @ rest or exertion, wheezing, chest tightness, or any other issues at this time.

## 2021-04-07 NOTE — ASSESSMENT
[FreeTextEntry1] : Mr. Mathur is a 26 year old male with a history of abnormal PFTs, GERD, low testosterone, STEPHANIE on CPAP, overweight, poor sleep, RLS, and SOB presenting to the office for a follow up visit.  His chief concern is obtaining new PAP device. \par \par STEPHANIE: \par - I have discussed all the negative health consequences associated with obstructive and central sleep apnea including heart conditions/MI, hypertension, diabetes, chronic inflammation, memory issues, stroke, obesity, decreased libido, sleep related accidents, as well as anxiety and depression. Additional recommendations included: Avoid alcohol and sedatives at bedtime. Proper sleep hygiene such as maintaining a regular sleep routine, avoiding naps if possible, not watching TV or reading in bed,  and maintaining a quiet, comfortable bedroom. Sleepy driving avoidance and risks discussed with patient. Diet, exercise and weight loss suggested\par - Additionally, I have discussed the need for compliance to using the machine nightly for adequate therapy as well as for ongoing coverage by insurance companies. Without adequate compliance, the DME company/insurance company may deny the patient replacement equipment or may also have the right to re-possess the machine.  Compliance to most insurance companies requires 4 hours or greater of pap use nightly.  The patient verbalized understanding and knows the next course of action. He will await to hear from the [employer].\par - RX for Dreamstation APAP in CPAP mode of [id number] w/ FFM (Med) RX'ed to be sent via [facility]. \par - Explained need for OPOX after new device is in use to monitor SPO2 w/ PAP device in use. Patient to contact our office when he receives his new device for further evaluation and RX. \par \par Patient to follow up with Dr. Marin as scheduled for 6/9/21.  Will be able to use that at 30-90 follow up visit post PAP initiation. \par Patient to call with further questions and concerns.\par Patient verbalizes understanding of care plan and is agreeable.\par

## 2021-04-12 ENCOUNTER — TRANSCRIPTION ENCOUNTER (OUTPATIENT)
Age: 26
End: 2021-04-12

## 2021-04-19 ENCOUNTER — APPOINTMENT (OUTPATIENT)
Dept: ENDOCRINOLOGY | Facility: CLINIC | Age: 26
End: 2021-04-19
Payer: COMMERCIAL

## 2021-04-19 VITALS
TEMPERATURE: 98.3 F | DIASTOLIC BLOOD PRESSURE: 60 MMHG | HEIGHT: 70 IN | HEART RATE: 96 BPM | WEIGHT: 315 LBS | OXYGEN SATURATION: 98 % | RESPIRATION RATE: 18 BRPM | BODY MASS INDEX: 45.1 KG/M2 | SYSTOLIC BLOOD PRESSURE: 120 MMHG

## 2021-04-19 PROCEDURE — 36415 COLL VENOUS BLD VENIPUNCTURE: CPT

## 2021-04-19 PROCEDURE — 99214 OFFICE O/P EST MOD 30 MIN: CPT | Mod: 25

## 2021-04-19 PROCEDURE — 99072 ADDL SUPL MATRL&STAF TM PHE: CPT

## 2021-04-19 NOTE — HISTORY OF PRESENT ILLNESS
[FreeTextEntry1] : 26 year  Male f/u for the evaluation of his chronic fatigue, low testosterone levels and hypothyroidism management. \par \par *** Apr 19, 2021 ***\par \par under stress. father passed away from . \par to see Dr. Gambino next month\par on Axiron 2 depressions daily, L-thyroxine 50 mcg\par with fatigue and night time sweating. seeing pulmonary. Libido is ok, but not great. \par Pit MRI (2/28/21)- pit microadenoma is less conspicuous  than on the prior exam. infundibulum is slightly deviated ti the left\par \par *** Televisit  Jan 13, 2021 ***\par \par under stress. Father with mts brain cancer, in the hospice now.\par otherwise , feels a lot better since starting TRT. More energetic, no need to take daytime naps anymore. Libido is still on a lower side, but attributes to his stress.\par currently on Axiron 2 depressions daily, L-thyroxine 50 mcg\par no recent labs\par \par \par *** Oct 12, 2020 ***\par \par off Haldol for about 2 weeks. now, on Abilify 5 mg. did not start dostinex b/o was on haldol at that time\par 2hr OGTT- GH < 0.03, glucose- 105\par \par states that had an increased thirst within past year, drinking about a gallon of cold water a day. Urinates at least 10 times a day, but no nocturia. no pit issues in the family. 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 He's been treated with CPAP which helped with sleeping, but his fatigue did not improve much. He was told that his testosterone was low in the past, but he did not take any testosterone supplements. He was also diagnosed with hypothyroidism about 2 years ago, and he's been on a stable dose of L-thyroxine 50 mcg qd.\par He denies ED and his spontaneous am erections are more or less preserved. However, his libido has decreased a lot. Shaving is well preserved. He is not in relationship right now; masturbates once every other week because of the low desire. Denies visual disturbances or headaches, dizziness, nausea, vomiting, abdominal pain,  breast discharge, worsening in peripheral vision. \par He underwent puberty at a normal rate compared with his peers. No history of learning disabilities or behavioral disorders. No history of insults to the brain or testes, including surgery, trauma, tumors, or radiation exposure. \par No history of diabetes, HIV, liver disease, or kidney disease. No history of medication use including anabolic steroids, glucocorticoids, chronic pain medications, or history of chemotherapy. His sense of smell is intact. \par Family history is negative for infertility problems and low testosterone.\par \par

## 2021-04-19 NOTE — ASSESSMENT
[FreeTextEntry1] : - repeat testo, CBC, liver functions, PSA, thyroid panel\par - cont Axiron 2 depressions daily and L-thyroxine 50 mcg, pending labs\par - r/o CDI given his clinical presentation and microadenoma in the posterior pituitary. monitor urine lytes/ serum/urine osm\par - since off haldol, might consider dostinex if needed \par - repeat  pituitary MRI in 02/22\par I've advised extensive nutritional education program, including weight loss teaching and evaluation. Proper dietary restrictions and exercise routines discussed. He unfortunately failed several weight loss programs including Weight Watchers.Medical weight loss therapies were reviewed with the patient. Bariatric options discussed again. He'll see Dr. Gambino next month\par RTC 3 mos

## 2021-04-20 ENCOUNTER — TRANSCRIPTION ENCOUNTER (OUTPATIENT)
Age: 26
End: 2021-04-20

## 2021-04-20 LAB
ALBUMIN SERPL ELPH-MCNC: 4.5 G/DL
ALP BLD-CCNC: 80 U/L
ALT SERPL-CCNC: 45 U/L
ANION GAP SERPL CALC-SCNC: 14 MMOL/L
AST SERPL-CCNC: 26 U/L
BASOPHILS # BLD AUTO: 0.03 K/UL
BASOPHILS NFR BLD AUTO: 0.4 %
BILIRUB SERPL-MCNC: 0.4 MG/DL
BUN SERPL-MCNC: 11 MG/DL
CALCIUM SERPL-MCNC: 9.7 MG/DL
CHLORIDE SERPL-SCNC: 102 MMOL/L
CO2 SERPL-SCNC: 20 MMOL/L
CREAT SERPL-MCNC: 0.71 MG/DL
EOSINOPHIL # BLD AUTO: 0.05 K/UL
EOSINOPHIL NFR BLD AUTO: 0.6 %
GLUCOSE SERPL-MCNC: 147 MG/DL
HCT VFR BLD CALC: 51 %
HGB BLD-MCNC: 15.7 G/DL
IMM GRANULOCYTES NFR BLD AUTO: 0.3 %
LYMPHOCYTES # BLD AUTO: 1.99 K/UL
LYMPHOCYTES NFR BLD AUTO: 25.6 %
MAN DIFF?: NORMAL
MCHC RBC-ENTMCNC: 26.2 PG
MCHC RBC-ENTMCNC: 30.8 GM/DL
MCV RBC AUTO: 85.1 FL
MONOCYTES # BLD AUTO: 0.52 K/UL
MONOCYTES NFR BLD AUTO: 6.7 %
NEUTROPHILS # BLD AUTO: 5.16 K/UL
NEUTROPHILS NFR BLD AUTO: 66.4 %
OSMOLALITY SERPL: 286 MOSMOL/KG
OSMOLALITY UR: 589 MOSM/KG
PLATELET # BLD AUTO: 265 K/UL
POTASSIUM SERPL-SCNC: 4.1 MMOL/L
PROLACTIN SERPL-MCNC: 4.1 NG/ML
PROT SERPL-MCNC: 6.9 G/DL
PSA SERPL-MCNC: 0.38 NG/ML
RBC # BLD: 5.99 M/UL
RBC # FLD: 13 %
SODIUM SERPL-SCNC: 137 MMOL/L
T4 FREE SERPL-MCNC: 1.2 NG/DL
TSH SERPL-ACNC: 1.8 UIU/ML
WBC # FLD AUTO: 7.77 K/UL

## 2021-04-22 LAB — SHBG SERPL-SCNC: 10.9 NMOL/L

## 2021-04-26 ENCOUNTER — TRANSCRIPTION ENCOUNTER (OUTPATIENT)
Age: 26
End: 2021-04-26

## 2021-04-26 LAB
TESTOST BND SERPL-MCNC: 4.9 PG/ML
TESTOST SERPL-MCNC: 157.5 NG/DL

## 2021-05-27 ENCOUNTER — APPOINTMENT (OUTPATIENT)
Dept: SURGERY | Facility: CLINIC | Age: 26
End: 2021-05-27
Payer: COMMERCIAL

## 2021-05-27 VITALS
OXYGEN SATURATION: 97 % | HEIGHT: 69 IN | HEART RATE: 75 BPM | DIASTOLIC BLOOD PRESSURE: 83 MMHG | SYSTOLIC BLOOD PRESSURE: 125 MMHG | TEMPERATURE: 98 F | BODY MASS INDEX: 44.49 KG/M2 | WEIGHT: 300.38 LBS

## 2021-05-27 DIAGNOSIS — Z56.0 UNEMPLOYMENT, UNSPECIFIED: ICD-10-CM

## 2021-05-27 DIAGNOSIS — Z82.49 FAMILY HISTORY OF ISCHEMIC HEART DISEASE AND OTHER DISEASES OF THE CIRCULATORY SYSTEM: ICD-10-CM

## 2021-05-27 DIAGNOSIS — Z82.5 FAMILY HISTORY OF ASTHMA AND OTHER CHRONIC LOWER RESPIRATORY DISEASES: ICD-10-CM

## 2021-05-27 DIAGNOSIS — Z82.0 FAMILY HISTORY OF EPILEPSY AND OTHER DISEASES OF THE NERVOUS SYSTEM: ICD-10-CM

## 2021-05-27 PROCEDURE — 99072 ADDL SUPL MATRL&STAF TM PHE: CPT

## 2021-05-27 PROCEDURE — 99205 OFFICE O/P NEW HI 60 MIN: CPT

## 2021-05-27 SDOH — ECONOMIC STABILITY - INCOME SECURITY: UNEMPLOYMENT, UNSPECIFIED: Z56.0

## 2021-05-27 NOTE — CONSULT LETTER
[Dear  ___] : Dear  [unfilled], [Consult Letter:] : I had the pleasure of evaluating your patient, [unfilled]. [Please see my note below.] : Please see my note below. [Consult Closing:] : Thank you very much for allowing me to participate in the care of this patient.  If you have any questions, please do not hesitate to contact me. [Sincerely,] : Sincerely, [FreeTextEntry3] : Pérez Gambino MD, FACS, FASMBS\par , Department of Surgery [facility]\par Director of Metabolic and Bariatric Surgery [facility]\par Director of Metabolic and Bariatric Surgery, and Robotic Minimally Invasive Surgery at [facility]

## 2021-05-27 NOTE — PHYSICAL EXAM
[Obese, well nourished, in no acute distress] : obese, well nourished, in no acute distress [Normal] : affect appropriate [de-identified] : Normoactive bowel sounds, no hepatosplenomegaly, no masses, non-tender.

## 2021-05-27 NOTE — HISTORY OF PRESENT ILLNESS
[de-identified] : The patient is a 26 year-old morbidly obese man, 5 feet 9 inches, 300 lbs. (BMI=44).  The patient originally presented 10/20/2019 at 308 pounds (BMI= 44). the patient presents today requesting weight loss surgery. He has been more than 100 lbs. overweight for the past 7 years and is currently 100 lbs greater than his greatest weight. ASA has lost up to 25lbs. on more than one occasion.  Most recently since April.\par \par The patient has tried numerous weight loss programs including Weight Watchers & self-directed and physician supervised diets. ASA has not taken weight loss medication due to known side effects. \par \par The patient denies diabetes, denies shortness of breath with exertion, has WEIGHT BEARING JOINT PAIN/LOWER BACK PAIN.  He has ASTHMA. He has STEPHANIE (on CPAP). He denies kidney, urinary tract disease, incontinence. He denies erectile dysfunction. He denies headache, dizziness, seizure or neurological disorders.\par He denies untreated thyroid, adrenal, pituitary disease. He denies depression, reports TOURETTE'S SYNDROME, mild case. The patient denies gallstones, reports HEARTBURN (on omeprazole), denies ulcers & denies liver disease. He denies high blood pressure, denies high cholesterol, denies history of heart attack or stroke. He denies anemia, denies bleeding disorders, thrombosis, clotting disorder or easy bruisability.  He denies peripheral edema. \par \par

## 2021-05-27 NOTE — REASON FOR VISIT
[Initial Consult] : an initial consult for [Morbid Obesity (BMI>40)] : morbid obesity (bmi>40) [FreeTextEntry2] : Reconsult, first visit 9/24/2019

## 2021-05-27 NOTE — PROCEDURE
[FreeTextEntry1] : The patient is a morbidly obese man, (BMI= 44), with significant weight related comorbidity including: Obstructive sleep apnea, reactive airways disease, weightbearing joint pain, low back pain, and gastroesophageal reflux; unable to lose weight and improve his co-morbid conditions with medical management including diet, exercise and weight loss medication.

## 2021-05-28 PROBLEM — Z56.0 UNEMPLOYED: Status: ACTIVE | Noted: 2021-05-28

## 2021-05-28 PROBLEM — Z82.0 FAMILY HISTORY OF SLEEP APNEA: Status: ACTIVE | Noted: 2021-05-28

## 2021-05-28 PROBLEM — Z82.5 FAMILY HISTORY OF ASTHMA: Status: ACTIVE | Noted: 2021-05-28

## 2021-05-28 PROBLEM — Z82.49 FAMILY HISTORY OF HEART DISEASE: Status: ACTIVE | Noted: 2021-05-28

## 2021-06-09 ENCOUNTER — APPOINTMENT (OUTPATIENT)
Dept: PULMONOLOGY | Facility: CLINIC | Age: 26
End: 2021-06-09
Payer: COMMERCIAL

## 2021-06-09 ENCOUNTER — NON-APPOINTMENT (OUTPATIENT)
Age: 26
End: 2021-06-09

## 2021-06-09 VITALS
WEIGHT: 288 LBS | DIASTOLIC BLOOD PRESSURE: 65 MMHG | TEMPERATURE: 97.7 F | HEIGHT: 69 IN | BODY MASS INDEX: 42.65 KG/M2 | HEART RATE: 76 BPM | SYSTOLIC BLOOD PRESSURE: 120 MMHG | RESPIRATION RATE: 18 BRPM | OXYGEN SATURATION: 97 %

## 2021-06-09 PROCEDURE — 99072 ADDL SUPL MATRL&STAF TM PHE: CPT

## 2021-06-09 PROCEDURE — 99214 OFFICE O/P EST MOD 30 MIN: CPT | Mod: 25

## 2021-06-09 PROCEDURE — 95012 NITRIC OXIDE EXP GAS DETER: CPT

## 2021-06-09 PROCEDURE — 94010 BREATHING CAPACITY TEST: CPT

## 2021-06-09 NOTE — ASSESSMENT
[FreeTextEntry1] : Mr. Mathur is a 26 year old male with a history of SOB, GERD, poor sleep, Tourette's syndrome, asthma, allergy, ADD, sleep apnea, ?RLS, insomnia, obesity, OCD, and anxiety presenting to the office for a follow up pulmonary re-evaluation - +food allergies. Dx of hypogonadism, hypothyroidism, pituitary tumor, allergies- improved with weight loss / new CPAP machine\par \par His shortness of breath is multifactorial due to:\par -obesity/out of shape\par -poor breathing mechanics\par Less likely\par -?CAD\par -?asthma\par \par problem 1: STEPHANIE\par -Settings: Dreamstation APAP CPAP mode of 10 cm H2O with FFM (BigFix)\par -off of Modafinil 200 mg QAM due to "crashing" \par -Sleep apnea is associated with adverse clinical consequences which an affect most organ systems.  Cardiovascular disease risk includes arrhythmias, atrial fibrillation, hypertension, coronary artery disease, and stroke. Metabolic disorders include diabetes type 2, non-alcoholic fatty liver disease. Mood disorder especially depression; and cognitive decline especially in the elderly. Associations with  chronic reflux/Leahy’s esophagus some but not all inclusive. \par -Reasons  include arousal consistent with hypopnea; respiratory events most prominent in REM sleep or supine position; therefore sleep staging and body position are important for accurate diagnosis and estimation of AHI. \par \par problem 2: insomnia/poor sleep\par -Recommended to use Insomnitol (take 1st) / Requip .5 mg\par -recommended to use Sleep Guard (2nd, if he wakes up)\par -recommended to use sunglasses 30 minutes before bedtime and to try room darkening window shades\par -Good sleep hygiene was encouraged including avoiding watching television an hour before bed, keeping caffeine at a low,  avoiding reading, television, or anything, in bed, no drinking any liquids three hours before bedtime, and only getting into bed when tired and ready for sleep. \par \par problem 3: RLS\par - He is s/p blood work, which revealed: ferritin level (normal), iron binding level (normal), testosterone level (low), TFT (normal) and vitamin D level (normal)\par - continue  Mirapex 0.5mg QHS\par -Restless Legs Syndrome (RLS), also known as Stewart-Ekbom Disease, is a common sleep -related movement disorder. About 1 in 10 adults in the [place] have problems from restless leg syndrome. It also can be seen in about 2% of children. Women are twice as likely as men to have RLS. People with RLS will have symptoms  most often during times when they are less active, especially at bedtime. RLS most often causes an overwhelming urge to move your legs and sometimes other parts of your body. This urge is associated with unpleasant sensations in different parts of th body. The symptoms can be mild to severe and can affect your ability to go to sleep and stay asleep. People with RLS often sleep less at night and feel more tired during the day. \par \par problem 4: abnormal PFTs-  c/w Asthma \par -MCT c/w asthma\par -confirms sleep apnea - inspiratory limb flattened\par -continue Breo 200 1 inhalation QD\par -continue Ventolin 2 puffs Q6H \par -continue Singulair 10 mg QHS \par \par problem 5: allergy sinus \par -continue Olopatadine 0.6% 1 sniff BID \par - add Clarinex 5 mg QAM\par Environmental measures for allergies were encouraged including mattress and pillow cover, air purifier, and environmental controls \par \par problem 6: residual fatigue\par -off Modafinil 200 mg QAM (5AM) - I-STOP reviewed due to "crashing" \par -endocrinology results f/u \par \par Problem 7: GERD\par - Continue Prilosec 20 mg QAM before breakfast\par -continue Pepcid 40mg QHS \par - Blaine Feldman - c/w mild GERD/HH \par \par Things to avoid including overeating, spicy foods, tight clothing, eating within three hours of bed, this list is not all inclusive. \par -Rule of 2s: avoid eating too much, eating too late, eating too spicy, eating two hours before bed\par -For treatment of reflux, possible options discussed including diet control, H2 blockers, PPIs, as well as coating motility agents discussed as treatment options. Timing of meals and proximity of last meal to sleep were discussed. If symptoms persist, a formal gastrointestinal evaluation is needed. \par \par problem 8: low testosterone\par -recommended supplemental Boron 6mg QD\par -continue on testosterone Rx (Abelev)\par -Referred to endocrinologist for further evaluation (Abelev)\par - Referred to urologist (Dave Hoenig)\par \par problem 9: poor breathing mechanics\par -Proper breathing techniques were reviewed with an emphasis of exhalation. Patient instructed to breath in for 1 second and out for four seconds. Patient was encouraged to not talk while walking. \par \par problem 10: obesity/out of shape\par - recommended to see Dr. Luca Singh\par -recommended to increase protein and decrease carbohydrates\par -recommended to reduce caffeine and increase water intake\par -Weight loss, exercise, and diet control were discussed and are highly encouraged. Treatment options were given such as, aqua therapy, and contacting a nutritionist. Recommended to use the elliptical, stationary bike, refrain from use of treadmill. Mindful eating was explained to the patient. Obesity is associated with worsening asthma, shortness of breath, and potential for cardiac disease, diabetes, and other underlying medical conditions.\par \par Problem 11a: COVID-19 precautionary Immune Support Recommendations:\par -OTC Vitamin C 500mg BID \par -OTC Quercetin 250-500mg BID \par -OTC Zinc 75-100mg per day \par -OTC Melatonin 1 or 2mg a night \par -OTC Vitamin D 1-4000mg per day \par -OTC Tonic Water 8oz per day\par -Water 0.5-1 gallon per day\par \par problem 11: health maintenance \par -s/p Covid-19 vaccine Pfizer x2\par -s/p Influenza vaccine 09/2020. \par -recommended strep pneumonia vaccines: Prevnar-13 vaccine, followed by Pneumo vaccine 23 one year following\par -recommended early intervention for URIs\par -recommended regular osteoporosis evaluations\par -recommended early dermatological evaluations\par -recommended after the age of 50 to the age of 70, colonoscopy every 5 years \par  \par \par Follow up in 4 months \par Patient is encouraged to call with any changes, concerns or questions.

## 2021-06-09 NOTE — REVIEW OF SYSTEMS
[Recent Wt Loss (___ Lbs)] : ~T recent [unfilled] lb weight loss [Negative] : Endocrine [EDS] : no eds [Dyspnea] : no dyspnea [SOB on Exertion] : no sob on exertion [Chest Discomfort] : no chest discomfort [GERD] : no gerd [Diarrhea] : no diarrhea [Constipation] : no constipation [Dysphagia] : no dysphagia

## 2021-06-09 NOTE — ADDENDUM
[FreeTextEntry1] : Documented by Julio Henry acting as a scribe for Dr. Brant Marin on 06/09/2021.\par \par All medical record entries made by the Scribe were at my, Dr. Brant Marin's, direction and personally dictated by me on 06/09/2021. I have reviewed the chart and agree that the record accurately reflects my personal performance of the history, physical exam, assessment and plan. I have also personally directed, reviewed, and agree with the discharge instructions. \par

## 2021-06-09 NOTE — HISTORY OF PRESENT ILLNESS
[FreeTextEntry1] : Mr. Mathur is a 26 year old male with a history of abnormal PFTs, GERD, low testosterone, STEPHANIE on CPAP, overweight, poor sleep, RLS, and SOB presenting to the office via video call today for a follow up visit. His chief complaint is\par -he reports feeling generally well\par -he states he has lost 29 pounds. He saw a bariatric doctor and was suggested to have surgery. He spoke with a dietician, and now eats only protein shakes 3 days per week, and cut out carbs and sugars\par -he notes his energy level has improved with weight loss and a new CPAP machine. he has trouble sleeping without his CPAP machine\par -he states he now sleeps 8 hours waking up rested. previously 9-10 hours waking up tired\par -he notes he exercises 2 days at the gym, and other days taking a walk with his dog\par -he reports he will be checking blood work every so often as he loses weight\par -he notes he still has some restless legs during the day, not as much at night\par -he states he is s/p the Pfizer vaccine 3/2021\par -he reports he only wheezes or coughs when doing strenuous activity\par -he denies any palpitations, chest pain, chest pressure, diarrhea, constipation, dysphagia, dizziness, sour taste in the mouth, leg swelling, leg pain, itchy eyes, itchy ears, heartburn, reflux, myalgias or arthralgias.

## 2021-06-09 NOTE — PROCEDURE
[FreeTextEntry1] : PFTS-spi reveals normal flows--fev1 was 4.04 liters or 92% of predicted, flat inspiratory limb\par \par FENO was 28; a normal value being less than 25\par Fractional exhaled nitric oxide (FENO) is regarded as a simple, noninvasive method for assessing eosinophilic airway inflammation. Produced by a variety of cells within the lung, nitric oxide (NO) concentrations are generally low in healthy individuals. However, high concentrations of NO appear to be involved in nonspecific host defense mechanisms and chronic inflammatory diseases such as asthma. The American Thoracic Society (ATS) therefore has recommended using FENO to aid in the diagnosis and monitoring of eosinophilic airway inflammation and asthma, and for identifying steroid responsive individuals whose chronic respiratory symptoms may be caused by airway inflammation.

## 2021-06-24 NOTE — ED PROVIDER NOTE - NSTIMEPROVIDERCAREINITIATE_GEN_ER
Dr. Glass from [facility] would like a call from an NP.  They did not want anyone pulled from a room and said it was not urgent.  Please call Dr. Glass @ [phone].   30-Aug-2020 00:34

## 2021-07-14 ENCOUNTER — EMERGENCY (EMERGENCY)
Facility: HOSPITAL | Age: 26
LOS: 1 days | Discharge: HOME | End: 2021-07-14
Attending: EMERGENCY MEDICINE | Admitting: EMERGENCY MEDICINE
Payer: COMMERCIAL

## 2021-07-14 ENCOUNTER — TRANSCRIPTION ENCOUNTER (OUTPATIENT)
Age: 26
End: 2021-07-14

## 2021-07-14 VITALS
TEMPERATURE: 98 F | OXYGEN SATURATION: 100 % | HEART RATE: 100 BPM | SYSTOLIC BLOOD PRESSURE: 144 MMHG | DIASTOLIC BLOOD PRESSURE: 84 MMHG | RESPIRATION RATE: 18 BRPM

## 2021-07-14 PROCEDURE — 99284 EMERGENCY DEPT VISIT MOD MDM: CPT

## 2021-07-14 NOTE — ED ADULT TRIAGE NOTE - CHIEF COMPLAINT QUOTE
pt c/o feeling off balance and dizzy since Friday. Pt denies headache, vision changes, n/v, weakness.

## 2021-07-14 NOTE — ED ADULT TRIAGE NOTE - CODE STROKE ACTIVE YN
PHYSICIAN NEXT STEPS:  Review Only    CHIEF COMPLAINT:  Chief Complaint/Protocol Used: PCP Call - No Triage  Onset: today      ASSESSMENT:  ? Onset: today  ? Normal True  ? Reason For Call Or Question: Patient called inquiring about post op instructions   ? Caller: Patient  -------------------------------------------------------    DISPOSITION:  Disposition Recommendation: Call PCP Now  Questions that led to disposition:  ? [1] Follow-up call from patient regarding patient's clinical status AND [2] information urgent  Patient Directed To: Unspecified  Patient Intended Action: Send a message to my doctor      CALL NOTES:  05/10/2021 at 11:22 AM by jones boland  ? Patient called because she had GB surgery on Friday nad did not have post op instructions. She had questions that needed an answer. I connected her to an agent to connect with the office. The agent said she sent a high priority message to Dr. Skinner's   office to call patient.     DISPOSITION OVERRIDE/PROVIDER CONSULT:  Disposition Override: N/A  Override Source: Unspecified  Consulted with PCP: No  Consulted with On-Call Physician: No    CALLER CONTACT INFO:  Name: Pooja Guadarrama (Self)  Phone 1: [phone] (Home Phone)  Phone 2: [phone] (Mobile) - Preferred      ENCOUNTER STARTED:  05/10/21 11:19:28 AM  ENCOUNTER ASSIGNED TO/CLOSED BY:  jones boland @ 05/10/21 11:22:40 AM      -------------------------------------------------------    CARE ADVICE given per PCP Call - No Triage guideline.  CALL PCP NOW: You need to discuss this with your doctor. I'll page him now. If you haven't heard from the on-call doctor within 30 minutes, call again.      UNDERSTANDS CARE ADVICE: Yes    AGREES WITH CARE ADVICE: No    WILL FOLLOW CARE ADVICE: No    -------------------------------------------------------  
No

## 2021-07-15 VITALS
SYSTOLIC BLOOD PRESSURE: 124 MMHG | HEART RATE: 83 BPM | RESPIRATION RATE: 16 BRPM | DIASTOLIC BLOOD PRESSURE: 76 MMHG | OXYGEN SATURATION: 98 %

## 2021-07-15 LAB
ALBUMIN SERPL ELPH-MCNC: 4.4 G/DL — SIGNIFICANT CHANGE UP (ref 3.3–5)
ALP SERPL-CCNC: 65 U/L — SIGNIFICANT CHANGE UP (ref 40–120)
ALT FLD-CCNC: 38 U/L — SIGNIFICANT CHANGE UP (ref 4–41)
ANION GAP SERPL CALC-SCNC: 16 MMOL/L — HIGH (ref 7–14)
AST SERPL-CCNC: 28 U/L — SIGNIFICANT CHANGE UP (ref 4–40)
B BURGDOR C6 AB SER-ACNC: NEGATIVE — SIGNIFICANT CHANGE UP
B BURGDOR IGG+IGM SER-ACNC: 0.07 INDEX — SIGNIFICANT CHANGE UP (ref 0.01–0.89)
BILIRUB SERPL-MCNC: 0.5 MG/DL — SIGNIFICANT CHANGE UP (ref 0.2–1.2)
BUN SERPL-MCNC: 14 MG/DL — SIGNIFICANT CHANGE UP (ref 7–23)
CALCIUM SERPL-MCNC: 9.3 MG/DL — SIGNIFICANT CHANGE UP (ref 8.4–10.5)
CHLORIDE SERPL-SCNC: 104 MMOL/L — SIGNIFICANT CHANGE UP (ref 98–107)
CO2 SERPL-SCNC: 21 MMOL/L — LOW (ref 22–31)
CREAT SERPL-MCNC: 0.74 MG/DL — SIGNIFICANT CHANGE UP (ref 0.5–1.3)
GLUCOSE SERPL-MCNC: 131 MG/DL — HIGH (ref 70–99)
HCT VFR BLD CALC: 43.9 % — SIGNIFICANT CHANGE UP (ref 39–50)
HGB BLD-MCNC: 13.9 G/DL — SIGNIFICANT CHANGE UP (ref 13–17)
MAGNESIUM SERPL-MCNC: 2.1 MG/DL — SIGNIFICANT CHANGE UP (ref 1.6–2.6)
MCHC RBC-ENTMCNC: 27.3 PG — SIGNIFICANT CHANGE UP (ref 27–34)
MCHC RBC-ENTMCNC: 31.7 GM/DL — LOW (ref 32–36)
MCV RBC AUTO: 86.2 FL — SIGNIFICANT CHANGE UP (ref 80–100)
NRBC # BLD: 0 /100 WBCS — SIGNIFICANT CHANGE UP
NRBC # FLD: 0 K/UL — SIGNIFICANT CHANGE UP
PHOSPHATE SERPL-MCNC: 4.1 MG/DL — SIGNIFICANT CHANGE UP (ref 2.5–4.5)
PLATELET # BLD AUTO: 226 K/UL — SIGNIFICANT CHANGE UP (ref 150–400)
POTASSIUM SERPL-MCNC: 3.7 MMOL/L — SIGNIFICANT CHANGE UP (ref 3.5–5.3)
POTASSIUM SERPL-SCNC: 3.7 MMOL/L — SIGNIFICANT CHANGE UP (ref 3.5–5.3)
PROT SERPL-MCNC: 6.9 G/DL — SIGNIFICANT CHANGE UP (ref 6–8.3)
RBC # BLD: 5.09 M/UL — SIGNIFICANT CHANGE UP (ref 4.2–5.8)
RBC # FLD: 13.3 % — SIGNIFICANT CHANGE UP (ref 10.3–14.5)
SODIUM SERPL-SCNC: 141 MMOL/L — SIGNIFICANT CHANGE UP (ref 135–145)
T4 FREE SERPL-MCNC: 1.2 NG/DL — SIGNIFICANT CHANGE UP (ref 0.9–1.8)
TSH SERPL-MCNC: 3.24 UIU/ML — SIGNIFICANT CHANGE UP (ref 0.27–4.2)
WBC # BLD: 6.71 K/UL — SIGNIFICANT CHANGE UP (ref 3.8–10.5)
WBC # FLD AUTO: 6.71 K/UL — SIGNIFICANT CHANGE UP (ref 3.8–10.5)

## 2021-07-15 RX ORDER — SODIUM CHLORIDE 9 MG/ML
1000 INJECTION INTRAMUSCULAR; INTRAVENOUS; SUBCUTANEOUS ONCE
Refills: 0 | Status: COMPLETED | OUTPATIENT
Start: 2021-07-15 | End: 2021-07-15

## 2021-07-15 RX ORDER — MECLIZINE HCL 12.5 MG
1 TABLET ORAL
Qty: 21 | Refills: 0
Start: 2021-07-15 | End: 2021-07-21

## 2021-07-15 RX ORDER — MECLIZINE HCL 12.5 MG
25 TABLET ORAL ONCE
Refills: 0 | Status: COMPLETED | OUTPATIENT
Start: 2021-07-15 | End: 2021-07-15

## 2021-07-15 RX ADMIN — Medication 25 MILLIGRAM(S): at 02:20

## 2021-07-15 RX ADMIN — SODIUM CHLORIDE 1000 MILLILITER(S): 9 INJECTION INTRAMUSCULAR; INTRAVENOUS; SUBCUTANEOUS at 02:42

## 2021-07-15 NOTE — ED PROVIDER NOTE - NS ED ROS FT
REVIEW OF SYSTEMS:    CONSTITUTIONAL: No weakness, fevers or chills  EYES/ENT: No visual changes;  No vertigo or throat pain   NECK: No pain or stiffness  RESPIRATORY: No cough, wheezing, hemoptysis; No shortness of breath  CARDIOVASCULAR: No chest pain or palpitations  GASTROINTESTINAL: No abdominal or epigastric pain. No nausea, vomiting, or hematemesis; No diarrhea or constipation. No melena or hematochezia.  GENITOURINARY: No dysuria, frequency or hematuria  NEUROLOGICAL: No numbness or weakness, + imbalance   All other review of systems is negative unless indicated above.

## 2021-07-15 NOTE — ED PROVIDER NOTE - ATTENDING CONTRIBUTION TO CARE
Afebrile. Awake and Alert. Lungs CTA. Heart RRR. Neurologic exam: A&O x3, speech clear, IAN, CN II-XII intact, motor strength +5/5 in all extremities, sensation equal bilaterally, finger-to-nose normal, heal-to-shin, gait steady.    Non-focal neuro exam, will defer CT imaging given age, ALARA and lack of focal findings  Side effect to Lexapro and Abilify considered, however, pt has been on medications long term  Pt has had MRI of pituitary within past 3 months, showing shrinking of adenoma  No sinus or ear Sx  Check lytes and TSH  Will treat with IVF and Meclizine as some positional component, ?BPPV  h/o restless leg syndrome with sensation ground is uneven, ?peripheral neuropathy  Pt established with neurology, advise f/u

## 2021-07-15 NOTE — ED PROVIDER NOTE - PROGRESS NOTE DETAILS
Neurologic exam: A&O x3, speech clear, IAN, CN II-XII intact, motor strength +5/5 in all extremities, sensation equal bilaterally, finger-to-nose normal, gait steady. Pt still has mild sensation the ground is uneven when he walks, but no vertigo and no cerebellar signs on exam. Pt has had MRI within past 4 months which showed improvement in pituitary adenoma. Pt has been on Abilify for >1 year and Omeprazole >15 years. Pt has appt with his Neurologist in on August 8th. Advised pt call office to move up appointment if possible. Return precautions discussed. FARHAT.

## 2021-07-15 NOTE — ED PROVIDER NOTE - PATIENT PORTAL LINK FT
You can access the FollowMyHealth Patient Portal offered by [facility] by registering at the following website: [email or web address]. By joining [facility]’s FollowMyHealth portal, you will also be able to view your health information using other applications (apps) compatible with our system.

## 2021-07-15 NOTE — ED PROVIDER NOTE - PHYSICAL EXAMINATION
GENERAL: NAD, lying in bed comfortably  HEAD:  Atraumatic, Normocephalic  EYES: EOMI, PERRLA, conjunctiva and sclera clear  ENT: Moist mucous membranes  NECK: Supple, No JVD  CHEST/LUNG: Clear to auscultation bilaterally; No rales, rhonchi, wheezing, or rubs. Unlabored respirations  HEART: Regular rate and rhythm; No murmurs, rubs, or gallops  ABDOMEN: Bowel sounds present; Soft, Nontender, Nondistended. No hepatomegaly  EXTREMITIES:  2+ Peripheral Pulses, brisk capillary refill. No clubbing, cyanosis, or edema  NERVOUS SYSTEM:  Alert & Oriented X3, speech clear. No deficits, CN 2-12 grossly intact. Horizontal nystagmus noted, no dysdiadochokinesia, neg finger-to-nose, negative heel-to-shin. heel-to-tie gait misbalanced   MSK: FROM all 4 extremities, full and equal strength  SKIN: No rashes or lesions GENERAL: NAD, lying in bed comfortably  HEAD:  Atraumatic, Normocephalic  EYES: EOMI, PERRLA, conjunctiva and sclera clear  ENT: Moist mucous membranes  NECK: Supple, No JVD  CHEST/LUNG: Clear to auscultation bilaterally; No rales, rhonchi, wheezing, or rubs. Unlabored respirations  HEART: Regular rate and rhythm; No murmurs, rubs, or gallops  ABDOMEN: Bowel sounds present; Soft, Nontender, Nondistended. No hepatomegaly  EXTREMITIES:  2+ Peripheral Pulses, brisk capillary refill. No clubbing, cyanosis, or edema  NERVOUS SYSTEM:  Alert & Oriented X3, speech clear. No deficits, CN 2-12 grossly intact. Horizontal nystagmus noted, no dysdiadochokinesia, neg finger-to-nose, negative heel-to-shin. heel-to-toe gait minor misbalanced   MSK: FROM all 4 extremities, full and equal strength  SKIN: No rashes or lesions

## 2021-07-15 NOTE — ED PROVIDER NOTE - CLINICAL SUMMARY MEDICAL DECISION MAKING FREE TEXT BOX
26M with PMH pituitary adenoma, Teretes, OCD, ADD, asthma, hypothyroidism, restless leg syndrome p/w complaints of imbalance. Given reassuring PE findings, treat conservatively w/ meclizine and IVF. Obtain labs. See attending attestation. FARHAT.

## 2021-07-15 NOTE — ED PROVIDER NOTE - NSFOLLOWUPINSTRUCTIONS_ED_ALL_ED_FT
-Follow-up with your Neurologist within 2 weeks. Call office for appointment. Follow-up with your primary care doctor within 1 week. Call offices for appointment.    -Take Meclizine per prescription.    -Drink plenty of fluids.    -Seek medical attention if you have worsening symptoms, headaches, vision/speech/motor symptoms or you have any concerns.

## 2021-07-30 ENCOUNTER — RX RENEWAL (OUTPATIENT)
Age: 26
End: 2021-07-30

## 2021-08-18 ENCOUNTER — RX RENEWAL (OUTPATIENT)
Age: 26
End: 2021-08-18

## 2021-09-09 ENCOUNTER — APPOINTMENT (OUTPATIENT)
Dept: ENDOCRINOLOGY | Facility: CLINIC | Age: 26
End: 2021-09-09
Payer: COMMERCIAL

## 2021-09-09 VITALS
HEART RATE: 85 BPM | DIASTOLIC BLOOD PRESSURE: 80 MMHG | WEIGHT: 285 LBS | OXYGEN SATURATION: 98 % | BODY MASS INDEX: 42.21 KG/M2 | HEIGHT: 69 IN | TEMPERATURE: 97.6 F | RESPIRATION RATE: 18 BRPM | SYSTOLIC BLOOD PRESSURE: 104 MMHG

## 2021-09-09 PROCEDURE — 99214 OFFICE O/P EST MOD 30 MIN: CPT

## 2021-09-09 NOTE — ASSESSMENT
[FreeTextEntry1] : - praised on wt loss. \par - repeat testo, CBC, liver functions, PSA, thyroid panel\par - might be able to observe off TRT since I'd expect an improvement in T levels with wt loss\par - cont  L-thyroxine 50 mcg, pending labs\par - r/o CDI given his clinical presentation and microadenoma in the posterior pituitary. monitor urine lytes/ serum/urine osm\par - since off haldol, might consider dostinex if needed \par - repeat  pituitary MRI in 02/22\par - Medical weight loss therapies were reviewed with the patient. He'll f/u  Dr. Gambino next month, but is open for trying GLP1 agonists. we discussed use of Saxenda or Wegovy, and might try after the blood work\par RTC 3 mos

## 2021-09-09 NOTE — HISTORY OF PRESENT ILLNESS
[FreeTextEntry1] : 26 year  Male f/u for the evaluation of his chronic fatigue, low testosterone levels and hypothyroidism management. \par \par *** Sep 09, 2021 ***\par \par feels much better, lost 40 lbs on the diet. seeing a dietician. saw Dr. Gambino, but wants to hold on with the sx for now b/o wt loss\par off TRT for about two month (b/o insurance issues), but is not feeling tired, denies ED/libido changes\par taking L-thyroxine 50 mcg\par \par *** Apr 19, 2021 ***\par \par under stress. father passed away from . \par to see Dr. Gambino next month\par on Axiron 2 depressions daily, L-thyroxine 50 mcg\par with fatigue and night time sweating. seeing pulmonary. Libido is ok, but not great. \par Pit MRI (2/28/21)- pit microadenoma is less conspicuous  than on the prior exam. infundibulum is slightly deviated ti the left\par \par *** Televisit  Jan 13, 2021 ***\par \par under stress. Father with mts brain cancer, in the hospice now.\par otherwise , feels a lot better since starting TRT. More energetic, no need to take daytime naps anymore. Libido is still on a lower side, but attributes to his stress.\par currently on Axiron 2 depressions daily, L-thyroxine 50 mcg\par no recent labs\par \par \par *** Oct 12, 2020 ***\par \par off Haldol for about 2 weeks. now, on Abilify 5 mg. did not start dostinex b/o was on haldol at that time\par 2hr OGTT- GH < 0.03, glucose- 105\par \par states that had an increased thirst within past year, drinking about a gallon of cold water a day. Urinates at least 10 times a day, but no nocturia. no pit issues in the family. 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 He's been treated with CPAP which helped with sleeping, but his fatigue did not improve much. He was told that his testosterone was low in the past, but he did not take any testosterone supplements. He was also diagnosed with hypothyroidism about 2 years ago, and he's been on a stable dose of L-thyroxine 50 mcg qd.\par He denies ED and his spontaneous am erections are more or less preserved. However, his libido has decreased a lot. Shaving is well preserved. He is not in relationship right now; masturbates once every other week because of the low desire. Denies visual disturbances or headaches, dizziness, nausea, vomiting, abdominal pain,  breast discharge, worsening in peripheral vision. \par He underwent puberty at a normal rate compared with his peers. No history of learning disabilities or behavioral disorders. No history of insults to the brain or testes, including surgery, trauma, tumors, or radiation exposure. \par No history of diabetes, HIV, liver disease, or kidney disease. No history of medication use including anabolic steroids, glucocorticoids, chronic pain medications, or history of chemotherapy. His sense of smell is intact. \par Family history is negative for infertility problems and low testosterone.\par \par

## 2021-09-14 ENCOUNTER — TRANSCRIPTION ENCOUNTER (OUTPATIENT)
Age: 26
End: 2021-09-14

## 2021-10-06 ENCOUNTER — RX RENEWAL (OUTPATIENT)
Age: 26
End: 2021-10-06

## 2021-10-20 ENCOUNTER — RX RENEWAL (OUTPATIENT)
Age: 26
End: 2021-10-20

## 2021-11-02 NOTE — DISCUSSION/SUMMARY
Pt states \"chest doesn't feel as tight\" after resp tx     Phil Espinoza RN  11/02/21 1389 [FreeTextEntry1] : - 3/23/2021 [employer] HST shows mild STEPHANIE w/ RDI of 13.6, SPO2 <90% for 6.9 mins and HR Range of  BPM. \par - Obtained recent PAP data on AirView from pt's current Resmed Device. \par 2/10-3/11/2021 data shows adequate treatment with CPAP @ [id number].\par \par - Explained need for OPOX after new device is in use to monitor SPO2 w/ PAP device in use. Patient to contact our office when he receives his new device for further evaluation. \par \par - Patient notes he would like to continue with his current DME Company, [employer]. \par - Patient would like to switch to Duo Security device d/t detachable portion for easier travel.  He notes he has had issues when he's traveled to [place] in past years.

## 2021-11-24 ENCOUNTER — TRANSCRIPTION ENCOUNTER (OUTPATIENT)
Age: 26
End: 2021-11-24

## 2021-12-08 ENCOUNTER — TRANSCRIPTION ENCOUNTER (OUTPATIENT)
Age: 26
End: 2021-12-08

## 2021-12-13 ENCOUNTER — APPOINTMENT (OUTPATIENT)
Dept: ENDOCRINOLOGY | Facility: CLINIC | Age: 26
End: 2021-12-13
Payer: COMMERCIAL

## 2021-12-13 VITALS
TEMPERATURE: 97.9 F | HEIGHT: 69 IN | RESPIRATION RATE: 17 BRPM | SYSTOLIC BLOOD PRESSURE: 110 MMHG | HEART RATE: 85 BPM | WEIGHT: 290 LBS | BODY MASS INDEX: 42.95 KG/M2 | OXYGEN SATURATION: 98 % | DIASTOLIC BLOOD PRESSURE: 60 MMHG

## 2021-12-13 PROCEDURE — 99214 OFFICE O/P EST MOD 30 MIN: CPT | Mod: 25

## 2021-12-13 RX ORDER — HALOPERIDOL 1 MG/1
1 TABLET ORAL
Qty: 60 | Refills: 0 | Status: DISCONTINUED | COMMUNITY
Start: 2020-08-25 | End: 2021-12-13

## 2021-12-13 RX ORDER — OMEPRAZOLE 40 MG/1
40 CAPSULE, DELAYED RELEASE ORAL
Qty: 90 | Refills: 0 | Status: DISCONTINUED | COMMUNITY
Start: 2019-06-19 | End: 2021-12-13

## 2021-12-13 RX ORDER — TESTOSTERONE 30 MG/1.5ML
30 SOLUTION TOPICAL
Qty: 1 | Refills: 0 | Status: DISCONTINUED | COMMUNITY
Start: 2020-10-19 | End: 2021-12-13

## 2021-12-13 RX ORDER — MODAFINIL 200 MG/1
200 TABLET ORAL
Qty: 30 | Refills: 5 | Status: DISCONTINUED | COMMUNITY
Start: 2019-05-07 | End: 2021-12-13

## 2021-12-13 RX ORDER — HALOPERIDOL 2 MG/1
2 TABLET ORAL
Qty: 360 | Refills: 0 | Status: DISCONTINUED | COMMUNITY
Start: 2019-07-30 | End: 2021-12-13

## 2021-12-13 NOTE — HISTORY OF PRESENT ILLNESS
[FreeTextEntry1] : 26 year  Male f/u for multiple medical issues\par \par *** Dec 13, 2021 ***\par \par feels well, but regained few lbs\par wants to wait with a bariatric sx since overall lost weight but is more open to weight loss meds\par did not repeat labs yet\par off TRT\par less tired, libido improved with weight loss. denies ED\par \par *** Sep 09, 2021 ***\par \par feels much better, lost 40 lbs on the diet. seeing a dietician. saw Dr. Gambino, but wants to hold on with the sx for now b/o wt loss\par off TRT for about two month (b/o insurance issues), but is not feeling tired, denies ED/libido changes\par taking L-thyroxine 50 mcg\par \par *** Apr 19, 2021 ***\par \par under stress. father passed away from . \par to see Dr. Gambino next month\par on Axiron 2 depressions daily, L-thyroxine 50 mcg\par with fatigue and night time sweating. seeing pulmonary. Libido is ok, but not great. \par Pit MRI (2/28/21)- pit microadenoma is less conspicuous  than on the prior exam. infundibulum is slightly deviated ti the left\par \par *** Televisit  Jan 13, 2021 ***\par \par under stress. Father with mts brain cancer, in the hospice now.\par otherwise , feels a lot better since starting TRT. More energetic, no need to take daytime naps anymore. Libido is still on a lower side, but attributes to his stress.\par currently on Axiron 2 depressions daily, L-thyroxine 50 mcg\par no recent labs\par \par \par *** Oct 12, 2020 ***\par \par off Haldol for about 2 weeks. now, on Abilify 5 mg. did not start dostinex b/o was on haldol at that time\par 2hr OGTT- GH < 0.03, glucose- 105\par \par states that had an increased thirst within past year, drinking about a gallon of cold water a day. Urinates at least 10 times a day, but no nocturia. no pit issues in the family. 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 He's been treated with CPAP which helped with sleeping, but his fatigue did not improve much. He was told that his testosterone was low in the past, but he did not take any testosterone supplements. He was also diagnosed with hypothyroidism about 2 years ago, and he's been on a stable dose of L-thyroxine 50 mcg qd.\par He denies ED and his spontaneous am erections are more or less preserved. However, his libido has decreased a lot. Shaving is well preserved. He is not in relationship right now; masturbates once every other week because of the low desire. Denies visual disturbances or headaches, dizziness, nausea, vomiting, abdominal pain,  breast discharge, worsening in peripheral vision. \par He underwent puberty at a normal rate compared with his peers. No history of learning disabilities or behavioral disorders. No history of insults to the brain or testes, including surgery, trauma, tumors, or radiation exposure. \par No history of diabetes, HIV, liver disease, or kidney disease. No history of medication use including anabolic steroids, glucocorticoids, chronic pain medications, or history of chemotherapy. His sense of smell is intact. \par Family history is negative for infertility problems and low testosterone.\par \par

## 2021-12-13 NOTE — ASSESSMENT
[FreeTextEntry1] : 1. Pit microadenoma. Hypogonadism\par - needs fasting labs asap\par - repeat testo, CBC, liver functions, PSA, thyroid panel\par - might be able to observe off TRT since I'd expect an improvement in T levels with wt loss\par - r/o CDI given his clinical presentation and microadenoma in the posterior pituitary. monitor urine lytes/ serum/urine osm\par - since off haldol, might consider dostinex if needed \par - repeat  pituitary MRI in 02/22\par \par 2. Hypothyroidism\par - cont  L-thyroxine 50 mcg, pending labs\par - Proper intake of levothyroxine is reviewed in details, including on an empty stomach, with water only, at least one hour before taking any medications, vitamins, or supplements and three-four hours before taking iron or calcium.\par \par 3. Morbid obesity\par - Medical weight loss therapies were reviewed with the patient. \par - will try  GLP1 agonists. Start  Wegovy after the blood work\par RTC 3 mos

## 2021-12-30 ENCOUNTER — APPOINTMENT (OUTPATIENT)
Dept: PULMONOLOGY | Facility: CLINIC | Age: 26
End: 2021-12-30
Payer: COMMERCIAL

## 2021-12-30 DIAGNOSIS — Z20.822 CONTACT WITH AND (SUSPECTED) EXPOSURE TO COVID-19: ICD-10-CM

## 2021-12-30 PROCEDURE — 99214 OFFICE O/P EST MOD 30 MIN: CPT | Mod: 95

## 2021-12-30 NOTE — ADDENDUM
[FreeTextEntry1] : Documented by Lissette Figueroa acting as a scribe for Dr. Brant Marin on 12/30/2021 .\par \par All medical record entries made by the Scribe were at my, Dr. Brant Marin's, direction and personally dictated by me on. I have reviewed the chart and agree that the record accurately reflects my personal performance of the history, physical exam, assessment and plan. I have also personally directed, reviewed, and agree with the discharge instructions.

## 2021-12-30 NOTE — ASSESSMENT
[FreeTextEntry1] : Mr. Mathur is a 26 year old male with a history of SOB, GERD, poor sleep, Tourette's syndrome, asthma, allergy, ADD, sleep apnea, ?RLS, insomnia, obesity, OCD, and anxiety presenting to the office for a follow up pulmonary re-evaluation - +food allergies. Dx of hypogonadism, hypothyroidism, pituitary tumor, allergies- improved with but sleep initiation issues-?Covid-19 infection \par \par His shortness of breath is multifactorial due to:\par -obesity/out of shape\par -poor breathing mechanics\par Less likely\par -?CAD\par -?asthma\par \par problem 1: STEPHANIE\par -Settings: Dreamstation APAP CPAP mode of 10 cm H2O with FFM ([employer])\par -off of Modafinil 200 mg QAM due to "crashing" \par -Sleep apnea is associated with adverse clinical consequences which an affect most organ systems.  Cardiovascular disease risk includes arrhythmias, atrial fibrillation, hypertension, coronary artery disease, and stroke. Metabolic disorders include diabetes type 2, non-alcoholic fatty liver disease. Mood disorder especially depression; and cognitive decline especially in the elderly. Associations with  chronic reflux/Leahy’s esophagus some but not all inclusive. \par -Reasons  include arousal consistent with hypopnea; respiratory events most prominent in REM sleep or supine position; therefore sleep staging and body position are important for accurate diagnosis and estimation of AHI. \par \par Problem 1A: Suspected COVID-19 infection \par -Alkaseltzer cold and flu \par -Recommended to quarantine for 10 days \par - Clean your hands often. Wash your hands often with soap and water for at least 20 seconds, especially after blowing your nose, coughing, or sneezing, or having been in a public place.\par - If soap and water are not available, use a hand  that contains at least 60% alcohol.\par - To the extent possible, avoid touching high-touch surfaces in public places - elevator buttons, door handles, handrails, handshaking with people, etc. Use a tissue or your sleeve to cover your hand or finger if you must touch something.\par - Wash your hands after touching surfaces in public places.\par - Avoid touching your face, nose, eyes, etc.\par - Clean and disinfect your home to remove germs: practice routine cleaning of frequently touched surfaces (for example: tables, doorknobs, light switches, handles, desks, toilets, faucets, sinks & cell phones)\par - Avoid crowds, especially in poorly ventilated spaces. Your risk of exposure to respiratory viruses like COVID-19 may increase in crowded, closed-in settings with little air circulation if there are people in the crowd who are sick. All patients are recommended to practice social distancing and stay at least 6 feet away from others. \par - Avoid all non-essential travel including plane trips, and especially avoid embarking on cruise ships.\par -If COVID-19 is spreading in your community, take extra measures to put distance between yourself and other people to further reduce your risk of being exposed to this new virus.\par -Stay home as much as possible.\par - Consider ways of getting food brought to your house through family, social, or commercial networks\par -Be aware that the virus has been known to live in the air up to 3 hours post exposure, cardboard up to 24 hours post exposure, copper up to 4 hours post exposure, steel and plastic up to 2-3 days post exposure. Risk of transmission from these surfaces are affected by many variables.\par COVID-19 precautionary Immune Support Recommendations:\par -OTC Vitamin C 500mg BID \par -OTC Quercetin 250-500mg BID \par -OTC Zinc 75-100mg per day \par -OTC Melatonin 1 or 2mg a night \par -OTC Vitamin D 1-4000mg per day \par -OTC Tonic Water 8oz per day\par -Water 0.5-1 gallon per day\par Asthma and COVID19:\par You need to make sure your asthma is under control. This often requires the use of inhaled corticosteroids (and sometimes oral corticosteroids). Inhaled corticosteroids do not likely reduce your immune system’s ability to fight infections, but oral corticosteroids may. It is important to use the steps above to protect yourself to limit your exposure to any respiratory virus. \par \par problem 2: insomnia/poor sleep\par -Recommended to use Insomnitol (take 1st) / Requip .5 mg\par -recommended to use Sleep Guard (2nd, if he wakes up)\par -Recommended Perrigo OTC \par -recommended to use sunglasses 30 minutes before bedtime and to try room darkening window shades\par -Good sleep hygiene was encouraged including avoiding watching television an hour before bed, keeping caffeine at a low,  avoiding reading, television, or anything, in bed, no drinking any liquids three hours before bedtime, and only getting into bed when tired and ready for sleep. \par \par problem 3: RLS\par - He is s/p blood work, which revealed: ferritin level (normal), iron binding level (normal), testosterone level (low), TFT (normal) and vitamin D level (normal)\par - continue  Mirapex 0.5mg QHS\par -Restless Legs Syndrome (RLS), also known as Stewart-Ekbom Disease, is a common sleep -related movement disorder. About 1 in 10 adults in the [place] have problems from restless leg syndrome. It also can be seen in about 2% of children. Women are twice as likely as men to have RLS. People with RLS will have symptoms  most often during times when they are less active, especially at bedtime. RLS most often causes an overwhelming urge to move your legs and sometimes other parts of your body. This urge is associated with unpleasant sensations in different parts of th body. The symptoms can be mild to severe and can affect your ability to go to sleep and stay asleep. People with RLS often sleep less at night and feel more tired during the day. \par \par problem 4: abnormal PFTs-  c/w Asthma \par -MCT c/w asthma\par -confirms sleep apnea - inspiratory limb flattened\par -continue Breo 200 1 inhalation QD\par -continue Ventolin 2 puffs Q6H \par -continue Singulair 10 mg QHS \par \par problem 5: allergy sinus \par -continue Olopatadine 0.6% 1 sniff BID \par - add Clarinex 5 mg QAM\par Environmental measures for allergies were encouraged including mattress and pillow cover, air purifier, and environmental controls \par \par problem 6: residual fatigue\par -off Modafinil 200 mg QAM (5AM) - I-STOP reviewed due to "crashing" \par -endocrinology results f/u \par \par Problem 7: GERD\par - Continue Prilosec 20 mg QAM before breakfast\par -continue Pepcid 40mg QHS \par - [place] Feldman - c/w mild GERD/HH \par \par Things to avoid including overeating, spicy foods, tight clothing, eating within three hours of bed, this list is not all inclusive. \par -Rule of 2s: avoid eating too much, eating too late, eating too spicy, eating two hours before bed\par -For treatment of reflux, possible options discussed including diet control, H2 blockers, PPIs, as well as coating motility agents discussed as treatment options. Timing of meals and proximity of last meal to sleep were discussed. If symptoms persist, a formal gastrointestinal evaluation is needed. \par \par problem 8: low testosterone\par -recommended supplemental Boron 6mg QD\par -continue on testosterone Rx (Abelev)\par -Referred to endocrinologist for further evaluation (Abelev)\par - Referred to urologist (Dave Hoenig)\par \par problem 9: poor breathing mechanics\par -Proper breathing techniques were reviewed with an emphasis of exhalation. Patient instructed to breath in for 1 second and out for four seconds. Patient was encouraged to not talk while walking. \par \par problem 10: obesity/out of shape\par - recommended to see Dr. Luca Singh\par -recommended to increase protein and decrease carbohydrates\par -recommended to reduce caffeine and increase water intake\par -Weight loss, exercise, and diet control were discussed and are highly encouraged. Treatment options were given such as, aqua therapy, and contacting a nutritionist. Recommended to use the elliptical, stationary bike, refrain from use of treadmill. Mindful eating was explained to the patient. Obesity is associated with worsening asthma, shortness of breath, and potential for cardiac disease, diabetes, and other underlying medical conditions.\par \par Problem 11a: COVID-19 precautionary Immune Support Recommendations:\par -OTC Vitamin C 500mg BID \par -OTC Quercetin 250-500mg BID \par -OTC Zinc 75-100mg per day \par -OTC Melatonin 1 or 2mg a night \par -OTC Vitamin D 1-4000mg per day \par -OTC Tonic Water 8oz per day\par -Water 0.5-1 gallon per day\par \par problem 11: health maintenance \par -s/p Covid-19 vaccine Pfizer x3\par -s/p Influenza vaccine 09/2020. \par -recommended strep pneumonia vaccines: Prevnar-13 vaccine, followed by Pneumo vaccine 23 one year following\par -recommended early intervention for URIs\par -recommended regular osteoporosis evaluations\par -recommended early dermatological evaluations\par -recommended after the age of 50 to the age of 70, colonoscopy every 5 years \par  \par \par Follow up in 4 months \par Patient is encouraged to call with any changes, concerns or questions.

## 2021-12-30 NOTE — HISTORY OF PRESENT ILLNESS
[Home] : at home, [unfilled] , at the time of the visit. [Medical Office: ([facility])___] : at the medical office located in  [Verbal consent obtained from patient] : the patient, [unfilled] [FreeTextEntry1] : Mr. Mathur is a 26 year old male with a history of abnormal PFTs, GERD, low testosterone, STEPHANIE on CPAP, overweight, poor sleep, RLS, and SOB presenting to the office via video call today for a follow up visit. His chief complaint is\par -he notes getting tested but results come back later today\par -he notes no additional vision issues\par -he notes his sleep has been alright but not great \par -he notes having trouble falling asleep \par -he notes sleeping less than he should\par -he notes sleeping 4 hrs/night \par -he denies cough and wheeze \par -he notes redesigning his room to avoid watching tv while in bed \par -he denies using anything to get to bed \par -he notes having a new CPAP \par -he notes weighing 290 lbs now\par -he notes biggest issue is sleep initiation \par denies any headaches, nausea, vomiting, fever, chills, sweats, chest pain, chest pressure, diarrhea, constipation, dysphagia, dizziness, leg swelling, leg pain, itchy eyes, itchy ears, heartburn, reflux, or sour taste in the mouth.

## 2022-01-02 ENCOUNTER — TRANSCRIPTION ENCOUNTER (OUTPATIENT)
Age: 27
End: 2022-01-02

## 2022-01-02 LAB
25(OH)D3 SERPL-MCNC: 21.8 NG/ML
ACTH SER-ACNC: 29.3 PG/ML
ALBUMIN SERPL ELPH-MCNC: 4.4 G/DL
ALP BLD-CCNC: 71 U/L
ALT SERPL-CCNC: 29 U/L
ANION GAP SERPL CALC-SCNC: 12 MMOL/L
AST SERPL-CCNC: 21 U/L
BASOPHILS # BLD AUTO: 0.02 K/UL
BASOPHILS NFR BLD AUTO: 0.3 %
BILIRUB SERPL-MCNC: 0.4 MG/DL
BUN SERPL-MCNC: 12 MG/DL
CALCIUM SERPL-MCNC: 9.5 MG/DL
CHLORIDE SERPL-SCNC: 105 MMOL/L
CHOLEST SERPL-MCNC: 175 MG/DL
CO2 SERPL-SCNC: 22 MMOL/L
CORTIS SERPL-MCNC: 8.3 UG/DL
CREAT SERPL-MCNC: 0.64 MG/DL
EOSINOPHIL # BLD AUTO: 0.08 K/UL
EOSINOPHIL NFR BLD AUTO: 1.2 %
ESTIMATED AVERAGE GLUCOSE: 108 MG/DL
FSH SERPL-MCNC: 1 IU/L
GLUCOSE SERPL-MCNC: 97 MG/DL
HBA1C MFR BLD HPLC: 5.4 %
HCT VFR BLD CALC: 52.1 %
HDLC SERPL-MCNC: 45 MG/DL
HGB BLD-MCNC: 16.2 G/DL
IGF-1 INTERP: NORMAL
IGF-I BLD-MCNC: 256 NG/ML
IMM GRANULOCYTES NFR BLD AUTO: 0.3 %
LDLC SERPL CALC-MCNC: 116 MG/DL
LH SERPL-ACNC: 5.6 IU/L
LYMPHOCYTES # BLD AUTO: 1.93 K/UL
LYMPHOCYTES NFR BLD AUTO: 29.1 %
MAN DIFF?: NORMAL
MCHC RBC-ENTMCNC: 27.4 PG
MCHC RBC-ENTMCNC: 31.1 GM/DL
MCV RBC AUTO: 88.2 FL
MONOCYTES # BLD AUTO: 0.53 K/UL
MONOCYTES NFR BLD AUTO: 8 %
NEUTROPHILS # BLD AUTO: 4.06 K/UL
NEUTROPHILS NFR BLD AUTO: 61.1 %
NONHDLC SERPL-MCNC: 130 MG/DL
OSMOLALITY SERPL: 960 MOSMOL/KG
OSMOLALITY UR: 966 MOSM/KG
PLATELET # BLD AUTO: 259 K/UL
POTASSIUM SERPL-SCNC: 4.2 MMOL/L
PROLACTIN SERPL-MCNC: 5.1 NG/ML
PROT SERPL-MCNC: 7.4 G/DL
PSA SERPL-MCNC: 0.47 NG/ML
RBC # BLD: 5.91 M/UL
RBC # FLD: 13 %
SHBG SERPL-SCNC: 11.3 NMOL/L
SODIUM SERPL-SCNC: 139 MMOL/L
T4 FREE SERPL DIALY-MCNC: 1.2 NG/DL
T4 FREE SERPL-MCNC: 1.1 NG/DL
TESTOST BND SERPL-MCNC: 11.2 PG/ML
TESTOSTERONE TOTAL S: 278 NG/DL
TRIGL SERPL-MCNC: 69 MG/DL
TSH SERPL-ACNC: 1.14 UIU/ML
WBC # FLD AUTO: 6.64 K/UL

## 2022-01-04 ENCOUNTER — TRANSCRIPTION ENCOUNTER (OUTPATIENT)
Age: 27
End: 2022-01-04

## 2022-01-05 ENCOUNTER — TRANSCRIPTION ENCOUNTER (OUTPATIENT)
Age: 27
End: 2022-01-05

## 2022-02-19 ENCOUNTER — OUTPATIENT (OUTPATIENT)
Dept: OUTPATIENT SERVICES | Facility: HOSPITAL | Age: 27
LOS: 1 days | End: 2022-02-19
Payer: COMMERCIAL

## 2022-02-19 ENCOUNTER — APPOINTMENT (OUTPATIENT)
Dept: MRI IMAGING | Facility: CLINIC | Age: 27
End: 2022-02-19
Payer: COMMERCIAL

## 2022-02-19 DIAGNOSIS — D35.2 BENIGN NEOPLASM OF PITUITARY GLAND: ICD-10-CM

## 2022-02-19 PROCEDURE — 70553 MRI BRAIN STEM W/O & W/DYE: CPT | Mod: 26

## 2022-02-19 PROCEDURE — 70553 MRI BRAIN STEM W/O & W/DYE: CPT

## 2022-02-19 PROCEDURE — A9585: CPT

## 2022-03-01 ENCOUNTER — RX RENEWAL (OUTPATIENT)
Age: 27
End: 2022-03-01

## 2022-03-16 ENCOUNTER — APPOINTMENT (OUTPATIENT)
Dept: BARIATRICS | Facility: CLINIC | Age: 27
End: 2022-03-16
Payer: COMMERCIAL

## 2022-03-16 ENCOUNTER — APPOINTMENT (OUTPATIENT)
Dept: ENDOCRINOLOGY | Facility: CLINIC | Age: 27
End: 2022-03-16
Payer: COMMERCIAL

## 2022-03-16 VITALS
HEART RATE: 96 BPM | DIASTOLIC BLOOD PRESSURE: 79 MMHG | OXYGEN SATURATION: 97 % | WEIGHT: 303 LBS | SYSTOLIC BLOOD PRESSURE: 134 MMHG | TEMPERATURE: 98.1 F | RESPIRATION RATE: 17 BRPM | BODY MASS INDEX: 44.88 KG/M2 | HEIGHT: 69 IN

## 2022-03-16 VITALS
WEIGHT: 290 LBS | BODY MASS INDEX: 42.95 KG/M2 | RESPIRATION RATE: 17 BRPM | TEMPERATURE: 97.7 F | HEIGHT: 69 IN | HEART RATE: 92 BPM | SYSTOLIC BLOOD PRESSURE: 120 MMHG | DIASTOLIC BLOOD PRESSURE: 70 MMHG | OXYGEN SATURATION: 98 %

## 2022-03-16 DIAGNOSIS — R79.89 OTHER SPECIFIED ABNORMAL FINDINGS OF BLOOD CHEMISTRY: ICD-10-CM

## 2022-03-16 PROCEDURE — 99214 OFFICE O/P EST MOD 30 MIN: CPT

## 2022-03-16 PROCEDURE — 99203 OFFICE O/P NEW LOW 30 MIN: CPT | Mod: 1L

## 2022-03-16 PROCEDURE — XXXXX: CPT

## 2022-03-16 PROCEDURE — 99213 OFFICE O/P EST LOW 20 MIN: CPT | Mod: 1L

## 2022-03-16 NOTE — HISTORY OF PRESENT ILLNESS
[FreeTextEntry1] : 27 year  Male f/u for multiple medical issues\par \par *** Mar 16, 2022 ***\par \par on Wegovy 1 mg qw, more flatulence and loose BM on this dose. No weight loss at all.\par taking L-thyroxine 50 mcg qd\par Pit MRI (2/19/22)- asymmetry of the pit gland. no lesions seen comparing to prior examinations. Stalk in midline, normal optic chiasm\par \par *** Dec 13, 2021 ***\par \par feels well, but regained few lbs\par wants to wait with a bariatric sx since overall lost weight but is more open to weight loss meds\par did not repeat labs yet\par off TRT\par less tired, libido improved with weight loss. denies ED\par \par *** Sep 09, 2021 ***\par \par feels much better, lost 40 lbs on the diet. seeing a dietician. saw Dr. Gambino, but wants to hold on with the sx for now b/o wt loss\par off TRT for about two month (b/o insurance issues), but is not feeling tired, denies ED/libido changes\par taking L-thyroxine 50 mcg\par \par *** Apr 19, 2021 ***\par \par under stress. father passed away from . \par to see Dr. Gambino next month\par on Axiron 2 depressions daily, L-thyroxine 50 mcg\par with fatigue and night time sweating. seeing pulmonary. Libido is ok, but not great. \par Pit MRI (2/28/21)- pit microadenoma is less conspicuous  than on the prior exam. infundibulum is slightly deviated ti the left\par \par *** Televisit  Jan 13, 2021 ***\par \par under stress. Father with mts brain cancer, in the hospice now.\par otherwise , feels a lot better since starting TRT. More energetic, no need to take daytime naps anymore. Libido is still on a lower side, but attributes to his stress.\par currently on Axiron 2 depressions daily, L-thyroxine 50 mcg\par no recent labs\par \par \par *** Oct 12, 2020 ***\par \par off Haldol for about 2 weeks. now, on Abilify 5 mg. did not start dostinex b/o was on haldol at that time\par 2hr OGTT- GH < 0.03, glucose- 105\par \par states that had an increased thirst within past year, drinking about a gallon of cold water a day. Urinates at least 10 times a day, but no nocturia. no pit issues in the family. 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 He's been treated with CPAP which helped with sleeping, but his fatigue did not improve much. He was told that his testosterone was low in the past, but he did not take any testosterone supplements. He was also diagnosed with hypothyroidism about 2 years ago, and he's been on a stable dose of L-thyroxine 50 mcg qd.\par He denies ED and his spontaneous am erections are more or less preserved. However, his libido has decreased a lot. Shaving is well preserved. He is not in relationship right now; masturbates once every other week because of the low desire. Denies visual disturbances or headaches, dizziness, nausea, vomiting, abdominal pain,  breast discharge, worsening in peripheral vision. \par He underwent puberty at a normal rate compared with his peers. No history of learning disabilities or behavioral disorders. No history of insults to the brain or testes, including surgery, trauma, tumors, or radiation exposure. \par No history of diabetes, HIV, liver disease, or kidney disease. No history of medication use including anabolic steroids, glucocorticoids, chronic pain medications, or history of chemotherapy. His sense of smell is intact. \par Family history is negative for infertility problems and low testosterone.\par \par

## 2022-03-16 NOTE — ASSESSMENT
[FreeTextEntry1] : 1. Pit microadenoma. Hypogonadism\par - needs fasting labs asap\par - repeat testo, CBC, liver functions, PSA, thyroid panel\par - might be able to observe off TRT since I'd expect an improvement in T levels with wt loss\par - r/o CDI given his clinical presentation and microadenoma in the posterior pituitary. monitor urine lytes/ serum/urine osm\par - since off haldol, might consider dostinex if needed \par - repeat  pituitary MRI in 02/23\par \par 2. Hypothyroidism\par - cont  L-thyroxine 50 mcg, pending labs\par - Proper intake of levothyroxine is reviewed in details, including on an empty stomach, with water only, at least one hour before taking any medications, vitamins, or supplements and three-four hours before taking iron or calcium.\par \par 3. Morbid obesity\par - Medical weight loss therapies were reviewed with the patient. Failed and intolerant of Wegovy\par - d/c Wegovy. Pt is interested in trying Plenity. Will rx\par - bariatric eval\par RTC 3 mos

## 2022-03-30 ENCOUNTER — APPOINTMENT (OUTPATIENT)
Dept: FAMILY MEDICINE | Facility: CLINIC | Age: 27
End: 2022-03-30
Payer: COMMERCIAL

## 2022-03-30 ENCOUNTER — APPOINTMENT (OUTPATIENT)
Dept: INTERNAL MEDICINE | Facility: CLINIC | Age: 27
End: 2022-03-30
Payer: COMMERCIAL

## 2022-03-30 VITALS
WEIGHT: 300 LBS | SYSTOLIC BLOOD PRESSURE: 122 MMHG | DIASTOLIC BLOOD PRESSURE: 80 MMHG | HEIGHT: 69 IN | OXYGEN SATURATION: 99 % | BODY MASS INDEX: 44.43 KG/M2 | HEART RATE: 88 BPM

## 2022-03-30 DIAGNOSIS — R53.82 CHRONIC FATIGUE, UNSPECIFIED: ICD-10-CM

## 2022-03-30 DIAGNOSIS — R53.81 CHRONIC FATIGUE, UNSPECIFIED: ICD-10-CM

## 2022-03-30 PROCEDURE — 99214 OFFICE O/P EST MOD 30 MIN: CPT

## 2022-03-30 PROCEDURE — ZZZZZ: CPT

## 2022-03-30 RX ORDER — SEMAGLUTIDE 1 MG/.5ML
1 INJECTION, SOLUTION SUBCUTANEOUS
Qty: 1 | Refills: 0 | Status: DISCONTINUED | COMMUNITY
Start: 2021-12-13 | End: 2022-03-30

## 2022-03-30 NOTE — PHYSICAL EXAM
[Well Nourished] : well nourished [No Rash] : no rash [Normal Gait] : normal gait [Normal Insight/Judgement] : insight and judgment were intact

## 2022-03-30 NOTE — ASSESSMENT
[FreeTextEntry1] : fatigue\par seeing endo, reviewed notes\par doing to get bw tomorrow\par reviewed endo notes and orders\par \par night sweats\par get bw and add on quantiferon gold\par \par thyroid\par Patient had a diagnosis of thyroid disorder.  Patient will continue on current dose of medications at this time unless instructed otherwise. Patient was advised to take thyroid medications on a empty stomach and to wait at least 45 minutes before eating for appropriate absorption.\par reviewed bw\par \par known sleep apnea\par on cpap\par \par noticed sweating when he is more tired \par \par \par

## 2022-03-30 NOTE — HISTORY OF PRESENT ILLNESS
[FreeTextEntry8] : 25 year old male here with complaints of not feeling right.  Patients active medications, allergies and issues were all reviewed with the patient at time of visit.\par \par

## 2022-04-01 NOTE — PHYSICAL EXAM
[Obese] : obese [Normal] : affect appropriate [de-identified] : Obese abdomen, centripetal obesity, soft,non-tender, no hernias, no surgical scars

## 2022-04-01 NOTE — REASON FOR VISIT
[Initial Consult] : an initial consult for [Morbid Obesity (BMI<40)] : morbid obesity (bmi<40) [Other___] : [unfilled] [FreeTextEntry2] : and bariatric surgery

## 2022-04-01 NOTE — HISTORY OF PRESENT ILLNESS
[de-identified] : Patient is a 26 y/o man with a BMI of 44 and the following obesity related comorbidities: GERD, STEPHANIE. He  has been struggling with weight for years and has failed multiple attempts at non-surgical weight loss options.  He has tried the following diets and weight loss options: Nutritionist, Medically Supervised Weight Loss, Hypgnosis/Acupuncture, Weight Watchers, Medications.  He* presents for bariatric surgery evaluation. \par \par Current Weight: 300 \par \par Highest Weight in the last 5 years: 330 \par \par Lowest Weight in the last 5 years: 270 \par \par Symptoms of GERD: Yes

## 2022-04-03 LAB
M TB IFN-G BLD-IMP: NEGATIVE
QUANTIFERON TB PLUS MITOGEN MINUS NIL: 10 IU/ML
QUANTIFERON TB PLUS NIL: 0 IU/ML
QUANTIFERON TB PLUS TB1 MINUS NIL: 0.01 IU/ML
QUANTIFERON TB PLUS TB2 MINUS NIL: 0.01 IU/ML

## 2022-04-04 ENCOUNTER — NON-APPOINTMENT (OUTPATIENT)
Age: 27
End: 2022-04-04

## 2022-04-06 ENCOUNTER — APPOINTMENT (OUTPATIENT)
Dept: INTERNAL MEDICINE | Facility: CLINIC | Age: 27
End: 2022-04-06
Payer: COMMERCIAL

## 2022-04-06 PROCEDURE — ZZZZZ: CPT

## 2022-04-12 ENCOUNTER — NON-APPOINTMENT (OUTPATIENT)
Age: 27
End: 2022-04-12

## 2022-04-12 ENCOUNTER — APPOINTMENT (OUTPATIENT)
Dept: PULMONOLOGY | Facility: CLINIC | Age: 27
End: 2022-04-12
Payer: COMMERCIAL

## 2022-04-12 VITALS
RESPIRATION RATE: 17 BRPM | BODY MASS INDEX: 42.95 KG/M2 | WEIGHT: 300 LBS | HEIGHT: 70 IN | HEART RATE: 94 BPM | OXYGEN SATURATION: 98 % | SYSTOLIC BLOOD PRESSURE: 120 MMHG | TEMPERATURE: 97.3 F | DIASTOLIC BLOOD PRESSURE: 70 MMHG

## 2022-04-12 DIAGNOSIS — M19.90 UNSPECIFIED OSTEOARTHRITIS, UNSPECIFIED SITE: ICD-10-CM

## 2022-04-12 PROCEDURE — 99214 OFFICE O/P EST MOD 30 MIN: CPT | Mod: 25

## 2022-04-12 PROCEDURE — 94010 BREATHING CAPACITY TEST: CPT

## 2022-04-12 PROCEDURE — 95012 NITRIC OXIDE EXP GAS DETER: CPT

## 2022-04-12 RX ORDER — SERTRALINE HYDROCHLORIDE 100 MG/1
100 TABLET, FILM COATED ORAL
Qty: 90 | Refills: 0 | Status: DISCONTINUED | COMMUNITY
Start: 2021-11-30

## 2022-04-12 RX ORDER — SEMAGLUTIDE 0.5 MG/.5ML
0.5 INJECTION, SOLUTION SUBCUTANEOUS
Qty: 2 | Refills: 0 | Status: DISCONTINUED | COMMUNITY
Start: 2021-12-15

## 2022-04-12 NOTE — PROCEDURE
[FreeTextEntry1] : FENO was 25; a normal value being less than 25. Fractional exhaled nitric oxide (FENO) is regarded as a simple, noninvasive method for assessing eosinophilic airway inflammation. Produced by a variety of cells within the lung, nitric oxide (NO) concentrations are generally low in healthy individuals. However, high concentrations of NO appear to be involved in nonspecific host defense mechanisms and chronic inflammatory  diseases such as asthma. The American Thoracic Society (ATS) therefore recommended using FENO to aid in the diagnosis and monitoring of eosinophilic airway inflammation and asthma, and for identifying steroid responsive individuals whose chronic respiratory symptoms may be caused by airway inflammation \par \par PFT revealed normal flows, with a FEV1 of 3.85L, which is 85% of predicted, with a normal flow volume loop

## 2022-04-12 NOTE — HISTORY OF PRESENT ILLNESS
[FreeTextEntry1] : Mr. Mathur is a 27 year old male with a history of abnormal PFTs, GERD, low testosterone, STEPHANIE on CPAP, overweight, poor sleep, RLS, and SOB presenting to the office today for a follow up visit. His chief complaint is\par -s/p blood work recently because he has been having a lot of night sweats that have been getting worse to the point where he has to change his sheets almost every night\par -he notes he has been feeling more fatigued\par -he denies coughing or wheezing\par -he notes he is seeing his doctor about the blood work tomorrow\par -he notes he has been feeling fatigued which has started the same time around the night sweats \par -he notes he is off the Wegovy \par -s/p COVID-19 vaccine x3 \par -he notes the sweats have been drenching sweats\par -he notes Dr. Murdock wants him to get bariatric surgery which he did with Dr. Carrillo\par \par \par -patient denies any headaches, nausea, vomiting, fever, chills, chest pain, chest pressure, palpitations, coughing, wheezing, diarrhea, constipation, dysphagia, myalgias, dizziness, leg swelling, leg pain, itchy eyes, itchy ears, heartburn, reflux or sour taste in the mouth

## 2022-04-12 NOTE — ADDENDUM
[FreeTextEntry1] : Documented by Julio Diallo acting as a scribe for Dr. Brant Marin on 04/12/2022\par \par All medical record entries made by the scribe were at my, Dr. Brant Marin's, direction and personally dictated by me on 04/12/2022. I have reviewed the chart and agree that the record accurately reflects my personal performance of the history, physical exam, assessment, and plan. I have also personally directed, reviewed, and agree with the discharge instructions.

## 2022-04-12 NOTE — ASSESSMENT
[FreeTextEntry1] : Mr. Mathur is a 27 year old male with a history of SOB, GERD, poor sleep, Tourette's syndrome, asthma, allergy, ADD, sleep apnea, ?RLS, insomnia, obesity, OCD, and anxiety presenting to the office for a follow up pulmonary re-evaluation - +food allergies. Dx of hypogonadism, hypothyroidism, pituitary tumor, allergies- improved - #1 is nocturnal sweats\par \par ******************************************************Pre-op Clearance*************************************************************** \par \par His shortness of breath is multifactorial due to:\par -obesity/out of shape\par -poor breathing mechanics\par Less likely\par -?CAD\par -?asthma\par \par problem 1: STEPHANIE\par -Settings: Dreamstation APAP CPAP mode of 10 cm H2O with FFM (PrizeBoxâ„¢)\par -off of Modafinil 200 mg QAM due to "crashing" \par -Sleep apnea is associated with adverse clinical consequences which an affect most organ systems.  Cardiovascular disease risk includes arrhythmias, atrial fibrillation, hypertension, coronary artery disease, and stroke. Metabolic disorders include diabetes type 2, non-alcoholic fatty liver disease. Mood disorder especially depression; and cognitive decline especially in the elderly. Associations with  chronic reflux/Leahy’s esophagus some but not all inclusive. \par -Reasons  include arousal consistent with hypopnea; respiratory events most prominent in REM sleep or supine position; therefore sleep staging and body position are important for accurate diagnosis and estimation of AHI. \par \par Problem 1A: Suspected COVID-19 infection (not found)\par -s/p COVID-19 vaccine x3 \par -s/p Alkaseltzer cold and flu \par -s/p quarantine for 10 days \par COVID-19 precautionary Immune Support Recommendations:\par -OTC Vitamin C 500mg BID \par -OTC Quercetin 250-500mg BID \par -OTC Zinc 75-100mg per day \par -OTC Melatonin 1 or 2mg a night \par -OTC Vitamin D 1-4000mg per day \par -OTC Tonic Water 8oz per day\par -Water 0.5-1 gallon per day\par Asthma and COVID19:\par You need to make sure your asthma is under control. This often requires the use of inhaled corticosteroids (and sometimes oral corticosteroids). Inhaled corticosteroids do not likely reduce your immune system’s ability to fight infections, but oral corticosteroids may. It is important to use the steps above to protect yourself to limit your exposure to any respiratory virus. \par \par Problem 1B: Pulmonary Pre-Op Clearance for Bariatric Surgery\par -at this point in time there are no absolute pulmonary contraindications to go forward with the planned procedure \par -at the time of surgery s/he should have optimal pain control, incentive spirometry, early ambulation, DVT and GI prophylaxis. \par \par problem 2: insomnia/poor sleep\par -Recommended to use Insomnitol (take 1st) / Requip .5 mg\par -recommended to use Sleep Guard (2nd, if he wakes up)\par -Recommended Perrigo OTC \par -recommended to use sunglasses 30 minutes before bedtime and to try room darkening window shades\par -Good sleep hygiene was encouraged including avoiding watching television an hour before bed, keeping caffeine at a low,  avoiding reading, television, or anything, in bed, no drinking any liquids three hours before bedtime, and only getting into bed when tired and ready for sleep. \par \par problem 3: RLS\par - He is s/p blood work, which revealed: ferritin level (normal), iron binding level (normal), testosterone level (low), TFT (normal) and vitamin D level (normal)\par - continue  Mirapex 0.5mg QHS\par -Restless Legs Syndrome (RLS), also known as Stewart-Ekbom Disease, is a common sleep -related movement disorder. About 1 in 10 adults in the [place] have problems from restless leg syndrome. It also can be seen in about 2% of children. Women are twice as likely as men to have RLS. People with RLS will have symptoms  most often during times when they are less active, especially at bedtime. RLS most often causes an overwhelming urge to move your legs and sometimes other parts of your body. This urge is associated with unpleasant sensations in different parts of th body. The symptoms can be mild to severe and can affect your ability to go to sleep and stay asleep. People with RLS often sleep less at night and feel more tired during the day. \par \par problem 4: abnormal PFTs-  c/w Asthma \par -MCT c/w asthma\par -confirms sleep apnea - inspiratory limb flattened\par -continue Breo 200 1 inhalation QD\par -continue Ventolin 2 puffs Q6H \par -continue Singulair 10 mg QHS \par \par problem 5: allergy sinus \par -continue Olopatadine 0.6% 1 sniff BID \par - add Clarinex 5 mg QAM\par Environmental measures for allergies were encouraged including mattress and pillow cover, air purifier, and environmental controls \par \par problem 6: residual fatigue\par -off Modafinil 200 mg QAM (5AM) - I-STOP reviewed due to "crashing" \par -endocrinology results f/u \par \par Problem 7: GERD\par - Continue Prilosec 20 mg QAM before breakfast\par -continue Pepcid 40mg QHS \par - Umpire Feldman - c/w mild GERD/HH \par \par Things to avoid including overeating, spicy foods, tight clothing, eating within three hours of bed, this list is not all inclusive. \par -Rule of 2s: avoid eating too much, eating too late, eating too spicy, eating two hours before bed\par -For treatment of reflux, possible options discussed including diet control, H2 blockers, PPIs, as well as coating motility agents discussed as treatment options. Timing of meals and proximity of last meal to sleep were discussed. If symptoms persist, a formal gastrointestinal evaluation is needed. \par \par problem 8: low testosterone (off Rx)\par -recommended supplemental Boron 6mg QD\par -Off testosterone Rx (Abelev)\par -Referred to endocrinologist for further evaluation (Abelev)\par - Referred to urologist (Dave Hoenig)\par \par problem 9: poor breathing mechanics\par -Recommended Wim Hof and Buteyko breathing techniques \par -Proper breathing techniques were reviewed with an emphasis of exhalation. Patient instructed to breath in for 1 second and out for four seconds. Patient was encouraged to not talk while walking. \par \par problem 10: obesity/out of shape\par - recommended to see Dr. Luca Singh\par -recommended to increase protein and decrease carbohydrates\par -recommended to reduce caffeine and increase water intake\par -Weight loss, exercise, and diet control were discussed and are highly encouraged. Treatment options were given such as, aqua therapy, and contacting a nutritionist. Recommended to use the elliptical, stationary bike, refrain from use of treadmill. Mindful eating was explained to the patient. Obesity is associated with worsening asthma, shortness of breath, and potential for cardiac disease, diabetes, and other underlying medical conditions.\par \par Problem 11a: COVID-19 precautionary Immune Support Recommendations:\par -OTC Vitamin C 500mg BID \par -OTC Quercetin 250-500mg BID \par -OTC Zinc 75-100mg per day \par -OTC Melatonin 1 or 2mg a night \par -OTC Vitamin D 1-4000mg per day \par -OTC Tonic Water 8oz per day\par -Water 0.5-1 gallon per day\par \par problem 11: health maintenance \par -Nocturnal sweats - Recommend pharmacy consult regarding his medication\par -s/p Covid-19 vaccine Pfizer x3\par -s/p Influenza vaccine 09/2020. \par -recommended strep pneumonia vaccines: Prevnar-13 vaccine, followed by Pneumo vaccine 23 one year following\par -recommended early intervention for URIs\par -recommended regular osteoporosis evaluations\par -recommended early dermatological evaluations\par -recommended after the age of 50 to the age of 70, colonoscopy every 5 years \par  \par \par Follow up in 4 months \par Patient is encouraged to call with any changes, concerns or questions.

## 2022-04-13 ENCOUNTER — APPOINTMENT (OUTPATIENT)
Dept: FAMILY MEDICINE | Facility: CLINIC | Age: 27
End: 2022-04-13
Payer: COMMERCIAL

## 2022-04-13 VITALS
DIASTOLIC BLOOD PRESSURE: 70 MMHG | BODY MASS INDEX: 42.95 KG/M2 | HEART RATE: 93 BPM | OXYGEN SATURATION: 97 % | HEIGHT: 70 IN | TEMPERATURE: 97.8 F | WEIGHT: 300 LBS | SYSTOLIC BLOOD PRESSURE: 119 MMHG

## 2022-04-13 DIAGNOSIS — R61 GENERALIZED HYPERHIDROSIS: ICD-10-CM

## 2022-04-13 PROCEDURE — 36415 COLL VENOUS BLD VENIPUNCTURE: CPT

## 2022-04-13 PROCEDURE — 99214 OFFICE O/P EST MOD 30 MIN: CPT | Mod: 25

## 2022-04-13 NOTE — PHYSICAL EXAM
[No Acute Distress] : no acute distress [Well Nourished] : well nourished [Normal Sclera/Conjunctiva] : normal sclera/conjunctiva [EOMI] : extraocular movements intact [Normal Outer Ear/Nose] : the outer ears and nose were normal in appearance [Normal] : normal rate, regular rhythm, normal S1 and S2 and no murmur heard [Coordination Grossly Intact] : coordination grossly intact [Normal Affect] : the affect was normal [Alert and Oriented x3] : oriented to person, place, and time [Normal Insight/Judgement] : insight and judgment were intact [de-identified] : morbidly obese

## 2022-04-13 NOTE — HISTORY OF PRESENT ILLNESS
[FreeTextEntry1] : saw bariatric, planning sleeve, will f/u w/ specialists\par continues to c/o night sweats and fatigue, seeing endo for hormonal issues; had recent bw done w/ Dr. Bruno, unremarkable\par aware he has to lose wgt

## 2022-04-14 LAB
BASOPHILS # BLD AUTO: 0.02 K/UL
BASOPHILS NFR BLD AUTO: 0.4 %
CK SERPL-CCNC: 194 U/L
CRP SERPL-MCNC: 3 MG/L
EBV EA AB SER IA-ACNC: <5 U/ML
EBV EA AB TITR SER IF: NEGATIVE
EBV EA IGG SER QL IA: <3 U/ML
EBV EA IGG SER-ACNC: NEGATIVE
EBV EA IGM SER IA-ACNC: NEGATIVE
EBV PATRN SPEC IB-IMP: NORMAL
EBV VCA IGG SER IA-ACNC: <10 U/ML
EBV VCA IGM SER QL IA: <10 U/ML
EOSINOPHIL # BLD AUTO: 0.05 K/UL
EOSINOPHIL NFR BLD AUTO: 0.9 %
EPSTEIN-BARR VIRUS CAPSID ANTIGEN IGG: NEGATIVE
ERYTHROCYTE [SEDIMENTATION RATE] IN BLOOD BY WESTERGREN METHOD: 21 MM/HR
FERRITIN SERPL-MCNC: 68 NG/ML
FOLATE SERPL-MCNC: 5.8 NG/ML
HCT VFR BLD CALC: 49.4 %
HGB BLD-MCNC: 15 G/DL
IMM GRANULOCYTES NFR BLD AUTO: 0.2 %
IRON SATN MFR SERPL: 14 %
IRON SERPL-MCNC: 49 UG/DL
LYMPHOCYTES # BLD AUTO: 1.89 K/UL
LYMPHOCYTES NFR BLD AUTO: 35.2 %
MAN DIFF?: NORMAL
MCHC RBC-ENTMCNC: 27.5 PG
MCHC RBC-ENTMCNC: 30.4 GM/DL
MCV RBC AUTO: 90.6 FL
MONOCYTES # BLD AUTO: 0.51 K/UL
MONOCYTES NFR BLD AUTO: 9.5 %
NEUTROPHILS # BLD AUTO: 2.89 K/UL
NEUTROPHILS NFR BLD AUTO: 53.8 %
PLATELET # BLD AUTO: 245 K/UL
RBC # BLD: 5.45 M/UL
RBC # FLD: 13.2 %
RHEUMATOID FACT SER QL: <10 IU/ML
TIBC SERPL-MCNC: 359 UG/DL
UIBC SERPL-MCNC: 310 UG/DL
URATE SERPL-MCNC: 6.3 MG/DL
VIT B12 SERPL-MCNC: 637 PG/ML
WBC # FLD AUTO: 5.37 K/UL

## 2022-04-15 LAB — ANA SER IF-ACNC: NEGATIVE

## 2022-04-26 LAB
ACTH SER-ACNC: 24.5 PG/ML
ALBUMIN SERPL ELPH-MCNC: 4.6 G/DL
ALP BLD-CCNC: 69 U/L
ALPHA SUBUNIT SERPL-MCNC: 0.2 NG/ML
ALT SERPL-CCNC: 36 U/L
ANION GAP SERPL CALC-SCNC: 13 MMOL/L
AST SERPL-CCNC: 21 U/L
BASOPHILS # BLD AUTO: 0.04 K/UL
BASOPHILS NFR BLD AUTO: 0.4 %
BILIRUB SERPL-MCNC: 0.7 MG/DL
BUN SERPL-MCNC: 14 MG/DL
CALCIUM SERPL-MCNC: 9.8 MG/DL
CHLORIDE SERPL-SCNC: 101 MMOL/L
CO2 SERPL-SCNC: 24 MMOL/L
CORTIS SERPL-MCNC: 4.4 UG/DL
CREAT SERPL-MCNC: 0.81 MG/DL
EGFR: 123 ML/MIN/1.73M2
EOSINOPHIL # BLD AUTO: 0.11 K/UL
EOSINOPHIL NFR BLD AUTO: 1.2 %
ESTIMATED AVERAGE GLUCOSE: 103 MG/DL
FSH SERPL-MCNC: 0.9 IU/L
GH SERPL-MCNC: 0.03 NG/ML
GLUCOSE SERPL-MCNC: 96 MG/DL
HBA1C MFR BLD HPLC: 5.2 %
HCT VFR BLD CALC: 43.5 %
HGB BLD-MCNC: 13.7 G/DL
IGF-1 INTERP: NORMAL
IGF-I BLD-MCNC: 283 NG/ML
IMM GRANULOCYTES NFR BLD AUTO: 0.2 %
LH SERPL-ACNC: 3.7 IU/L
LYMPHOCYTES # BLD AUTO: 3.38 K/UL
LYMPHOCYTES NFR BLD AUTO: 36.1 %
MAN DIFF?: NORMAL
MCHC RBC-ENTMCNC: 27.7 PG
MCHC RBC-ENTMCNC: 31.5 GM/DL
MCV RBC AUTO: 88.1 FL
MONOCYTES # BLD AUTO: 0.59 K/UL
MONOCYTES NFR BLD AUTO: 6.3 %
MONOMERIC PROLACTIN (ICMA)*: 4.52 NG/ML
NEUTROPHILS # BLD AUTO: 5.23 K/UL
NEUTROPHILS NFR BLD AUTO: 55.8 %
OSMOLALITY SERPL: 287 MOSMOL/KG
PERCENT MACROPROLACTIN: 9 %
PLATELET # BLD AUTO: 287 K/UL
POTASSIUM SERPL-SCNC: 4.6 MMOL/L
PROLACTIN SERPL-MCNC: 5 NG/ML
PROLACTIN SERPL-MCNC: 5 NG/ML
PROLACTIN SERPL-MCNC: 5.4 NG/ML
PROLACTIN, SERUM (ICMA)*: 4.96 NG/ML
PROT SERPL-MCNC: 7 G/DL
PSA SERPL-MCNC: 0.5 NG/ML
RBC # BLD: 4.94 M/UL
RBC # FLD: 13.2 %
SHBG SERPL-SCNC: 12.2 NMOL/L
SODIUM SERPL-SCNC: 138 MMOL/L
T4 FREE SERPL DIALY-MCNC: 0.91 NG/DL
T4 FREE SERPL-MCNC: 1.2 NG/DL
TESTOST FREE SERPL-MCNC: 10.4 PG/ML
TESTOST SERPL-MCNC: 127 NG/DL
TSH SERPL-ACNC: 3.76 UIU/ML
WBC # FLD AUTO: 9.37 K/UL

## 2022-04-28 ENCOUNTER — TRANSCRIPTION ENCOUNTER (OUTPATIENT)
Age: 27
End: 2022-04-28

## 2022-04-28 LAB
ACTH SER-ACNC: 34.8 PG/ML
CORTIS SERPL-MCNC: 10.2 UG/DL
GH SERPL-MCNC: <0.03 NG/ML

## 2022-04-29 LAB
IGF-1 INTERP: NORMAL
IGF-I BLD-MCNC: 187 NG/ML

## 2022-05-02 ENCOUNTER — TRANSCRIPTION ENCOUNTER (OUTPATIENT)
Age: 27
End: 2022-05-02

## 2022-05-09 LAB
CORTICOSTEROID BIND GLOBULIN: 1.7 MG/DL
CORTIS SERPL-MCNC: 12 UG/DL
CORTISOL, FREE: 2.1 UG/DL
PFCX: 17 %

## 2022-05-11 ENCOUNTER — APPOINTMENT (OUTPATIENT)
Dept: INTERNAL MEDICINE | Facility: CLINIC | Age: 27
End: 2022-05-11

## 2022-05-11 VITALS
HEIGHT: 70 IN | DIASTOLIC BLOOD PRESSURE: 74 MMHG | HEART RATE: 85 BPM | TEMPERATURE: 98 F | SYSTOLIC BLOOD PRESSURE: 120 MMHG | WEIGHT: 314.44 LBS | BODY MASS INDEX: 45.02 KG/M2 | OXYGEN SATURATION: 97 %

## 2022-05-11 PROCEDURE — ZZZZZ: CPT

## 2022-05-17 NOTE — PLAN
[FreeTextEntry1] : Weight loss surgery.  The risks, benefits and alternatives, including laparoscopic gastric bypass, laparoscopic gastric banding and laparoscopic vertical sleeve gastrectomy, were discussed at length and all of his questions were answered.  The patient appears to understand and wishes to proceed.\par \par The patient was given the following instructions:\par \par 1.	He needs a complete medical evaluation including echocardiogram, stress test, pulmonary function test and evaluation for sleep apnea.\par \par 2.	He needs an upper endoscopy.\par \par 3.	He needs dietary and psychological evaluations.\par \par 4.	He must attend a preoperative support group meeting.\par \par 5.	He must undergo a right upper quadrant ultrasound.\par \par The patient clearly understood that surgery would only be scheduled if there are no medical or psychiatric contraindications and a second office visit is required.  \par  Her/She

## 2022-05-23 ENCOUNTER — TRANSCRIPTION ENCOUNTER (OUTPATIENT)
Age: 27
End: 2022-05-23

## 2022-06-01 ENCOUNTER — APPOINTMENT (OUTPATIENT)
Dept: INTERNAL MEDICINE | Facility: CLINIC | Age: 27
End: 2022-06-01

## 2022-06-01 VITALS
HEIGHT: 70 IN | OXYGEN SATURATION: 96 % | DIASTOLIC BLOOD PRESSURE: 74 MMHG | TEMPERATURE: 98.2 F | SYSTOLIC BLOOD PRESSURE: 132 MMHG | WEIGHT: 315 LBS | HEART RATE: 84 BPM | BODY MASS INDEX: 45.1 KG/M2

## 2022-06-01 PROCEDURE — ZZZZZ: CPT

## 2022-06-29 ENCOUNTER — TRANSCRIPTION ENCOUNTER (OUTPATIENT)
Age: 27
End: 2022-06-29

## 2022-07-06 ENCOUNTER — TRANSCRIPTION ENCOUNTER (OUTPATIENT)
Age: 27
End: 2022-07-06

## 2022-07-06 ENCOUNTER — APPOINTMENT (OUTPATIENT)
Dept: INTERNAL MEDICINE | Facility: CLINIC | Age: 27
End: 2022-07-06

## 2022-07-06 VITALS
OXYGEN SATURATION: 96 % | BODY MASS INDEX: 45.1 KG/M2 | HEIGHT: 70 IN | DIASTOLIC BLOOD PRESSURE: 82 MMHG | WEIGHT: 315 LBS | SYSTOLIC BLOOD PRESSURE: 135 MMHG | TEMPERATURE: 98.4 F | HEART RATE: 89 BPM

## 2022-07-06 PROCEDURE — ZZZZZ: CPT

## 2022-07-18 ENCOUNTER — APPOINTMENT (OUTPATIENT)
Dept: ORTHOPEDIC SURGERY | Facility: CLINIC | Age: 27
End: 2022-07-18

## 2022-07-18 VITALS — WEIGHT: 310 LBS | BODY MASS INDEX: 44.38 KG/M2 | HEIGHT: 70 IN

## 2022-07-18 DIAGNOSIS — G56.01 CARPAL TUNNEL SYNDROME, RIGHT UPPER LIMB: ICD-10-CM

## 2022-07-18 DIAGNOSIS — G56.02 CARPAL TUNNEL SYNDROME, LEFT UPPER LIMB: ICD-10-CM

## 2022-07-18 LAB
24R-OH-CALCIDIOL SERPL-MCNC: 75 PG/ML
A-TOCOPHEROL VIT E SERPL-MCNC: 8.5 MG/L
BETA+GAMMA TOCOPHEROL SERPL-MCNC: 1.5 MG/L
COPPER SERPL-MCNC: 116 UG/DL
FERRITIN SERPL-MCNC: 49 NG/ML
FOLATE SERPL-MCNC: 6.2 NG/ML
IRON SATN MFR SERPL: 16 %
IRON SERPL-MCNC: 57 UG/DL
MENADIONE SERPL-MCNC: 0.78 NG/ML
TIBC SERPL-MCNC: 354 UG/DL
TRANSFERRIN SERPL-MCNC: 280 MG/DL
UIBC SERPL-MCNC: 298 UG/DL
VIT A SERPL-MCNC: 39.1 UG/DL
VIT B1 SERPL-MCNC: 143.5 NMOL/L
VIT B12 SERPL-MCNC: 572 PG/ML
ZINC SERPL-MCNC: 87 UG/DL

## 2022-07-18 PROCEDURE — 20526 THER INJECTION CARP TUNNEL: CPT | Mod: 50

## 2022-07-18 PROCEDURE — J3490M: CUSTOM

## 2022-07-18 PROCEDURE — 99203 OFFICE O/P NEW LOW 30 MIN: CPT | Mod: 25

## 2022-07-18 NOTE — ASSESSMENT
[FreeTextEntry1] : Bilateral Carpal tunnel injection was performed because of pain inflammation\par Anesthesia: ethyl chloride sprayed topically\par Celestone: An injection of Celestone 1cc\par Lidocaine: An injection of Lidocaine 1% 1cc\par Marcaine: An injection of Marcaine 0.5% 1cc\par x2\par \par Patient has tried OTC's including aspirin, Ibuprofen, Aleve etc or prescription NSAIDS, and/or exercises at home and/ or\par physical therapy without satisfactory response.\par After verbal consent using sterile preparation and technique. The risks, benefits, and alternatives to cortisone injection\par were explained in full to the patient. Risks outlined include but are not limited to infection, sepsis, bleeding, scarring, skin\par discoloration, temporary increase in pain, syncopal episode, failure to resolve symptoms, allergic reaction, symptom\par recurrence, and elevation of blood sugar in diabetics. Patient understood the risks. All questions were answered. After\par discussion of options, patient requested an injection. Oral informed consent was obtained and sterile prep was done of the\par injection site. Sterile technique was utilized for the procedure including the preparation of the solutions used for the\par injection. Patient tolerated the procedure well. Advised to ice the injection site this evening.\par Prep with betadine locally to site. Sterile technique used

## 2022-07-18 NOTE — PHYSICAL EXAM
[de-identified] : R hand: \par Tender volar wrist \par Good finger ROM \par +Tinels \par +Phalens \par +Compression test \par Decreased sensation median nerve distribution\par \par \par L hand: \par Tender volar wrist \par Good finger ROM \par +Tinels \par +Phalens \par +Compression test \par Decreased sensation median nerve distribution\par

## 2022-07-18 NOTE — HISTORY OF PRESENT ILLNESS
[2] : 2 [1] : 2 [Tingling] : tingling [Ice] : ice [de-identified] : Bilateral CTS\par Mod on EMG\par He cannot tolerate braces at night  [] : no [FreeTextEntry1] : wrists [FreeTextEntry3] : June 2022 [FreeTextEntry5] : denies injury. has N/T [de-identified] : activity [de-identified] : June 2022 [de-identified] : [facility] neuro [de-identified] : emg

## 2022-07-26 ENCOUNTER — APPOINTMENT (OUTPATIENT)
Dept: ENDOCRINOLOGY | Facility: CLINIC | Age: 27
End: 2022-07-26

## 2022-07-26 VITALS
DIASTOLIC BLOOD PRESSURE: 70 MMHG | SYSTOLIC BLOOD PRESSURE: 122 MMHG | TEMPERATURE: 98 F | HEART RATE: 101 BPM | HEIGHT: 70 IN | OXYGEN SATURATION: 98 % | BODY MASS INDEX: 45.1 KG/M2 | RESPIRATION RATE: 17 BRPM | WEIGHT: 315 LBS

## 2022-07-26 PROCEDURE — 99214 OFFICE O/P EST MOD 30 MIN: CPT

## 2022-07-26 NOTE — ASSESSMENT
[FreeTextEntry1] : 1. Pit microadenoma. Hypogonadism\par - repeat fasting  testo, CBC, liver functions, PSA, thyroid panel\par - might be able to observe off TRT since I'd expect an improvement in T levels with wt loss\par - r/o CDI given his clinical presentation and microadenoma in the posterior pituitary. monitor urine lytes/ serum/urine osm\par - since off haldol, might consider dostinex if needed \par - repeat  pituitary MRI in 02/23\par - cont Xyosted 75 mg qw, pending labs\par \par 2. Hypothyroidism\par - cont  L-thyroxine 50 mcg, pending labs\par - Proper intake of levothyroxine is reviewed in details, including on an empty stomach, with water only, at least one hour before taking any medications, vitamins, or supplements and three-four hours before taking iron or calcium.\par \par 3. Morbid obesity\par - Medical weight loss therapies were reviewed with the patient. Failed and intolerant of Wegovy. We also reviewed Mounjaro, but at present mutually agreed to wait until proceeds with a sleeve\par - bariatric f/u\par RTC 3 mos

## 2022-07-26 NOTE — REASON FOR VISIT
[Follow - Up] : a follow-up visit [Weight Management/Obesity] : weight management/obesity [Hypogonadism] : hypogonadism [Hypothyroidism] : hypothyroidism [Pituitary Evaluation/ Disorder] : pituitary evaluation/disorder

## 2022-07-26 NOTE — HISTORY OF PRESENT ILLNESS
[FreeTextEntry1] : 27 year  Male f/u for multiple medical issues\par \par *** Jul 26, 2022 ***\par \par off Wegovy. Did not try Plenity yet\par seeing a bariatric team. wt fluctuates\par for possible sleeve in september\par not taking haldol, take abilify\par back on Xyosted 75 mg qw (on Wed) - night sweats resolved, energy improved a lot. sex drive/ erections good\par also, on L-thyroxine 50 mcg\par \par *** Mar 16, 2022 ***\par \par on Wegovy 1 mg qw, more flatulence and loose BM on this dose. No weight loss at all.\par taking L-thyroxine 50 mcg qd\par Pit MRI (2/19/22)- asymmetry of the pit gland. no lesions seen comparing to prior examinations. Stalk in midline, normal optic chiasm\par \par *** Dec 13, 2021 ***\par \par feels well, but regained few lbs\par wants to wait with a bariatric sx since overall lost weight but is more open to weight loss meds\par did not repeat labs yet\par off TRT\par less tired, libido improved with weight loss. denies ED\par \par *** Sep 09, 2021 ***\par \par feels much better, lost 40 lbs on the diet. seeing a dietician. saw Dr. Gambino, but wants to hold on with the sx for now b/o wt loss\par off TRT for about two month (b/o insurance issues), but is not feeling tired, denies ED/libido changes\par taking L-thyroxine 50 mcg\par \par *** Apr 19, 2021 ***\par \par under stress. father passed away from GB. \par to see Dr. Gambino next month\par on Axiron 2 depressions daily, L-thyroxine 50 mcg\par with fatigue and night time sweating. seeing pulmonary. Libido is ok, but not great. \par Pit MRI (2/28/21)- pit microadenoma is less conspicuous  than on the prior exam. infundibulum is slightly deviated ti the left\par \par *** Televisit  Jan 13, 2021 ***\par \par under stress. Father with mts brain cancer, in the hospice now.\par otherwise , feels a lot better since starting TRT. More energetic, no need to take daytime naps anymore. Libido is still on a lower side, but attributes to his stress.\par currently on Axiron 2 depressions daily, L-thyroxine 50 mcg\par no recent labs\par \par \par *** Oct 12, 2020 ***\par \par off Haldol for about 2 weeks. now, on Abilify 5 mg. did not start dostinex b/o was on haldol at that time\par 2hr OGTT- GH < 0.03, glucose- 105\par \par states that had an increased thirst within past year, drinking about a gallon of cold water a day. Urinates at least 10 times a day, but no nocturia. no pit issues in the family. 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 He's been treated with CPAP which helped with sleeping, but his fatigue did not improve much. He was told that his testosterone was low in the past, but he did not take any testosterone supplements. He was also diagnosed with hypothyroidism about 2 years ago, and he's been on a stable dose of L-thyroxine 50 mcg qd.\par He denies ED and his spontaneous am erections are more or less preserved. However, his libido has decreased a lot. Shaving is well preserved. He is not in relationship right now; masturbates once every other week because of the low desire. Denies visual disturbances or headaches, dizziness, nausea, vomiting, abdominal pain,  breast discharge, worsening in peripheral vision. \par He underwent puberty at a normal rate compared with his peers. No history of learning disabilities or behavioral disorders. No history of insults to the brain or testes, including surgery, trauma, tumors, or radiation exposure. \par No history of diabetes, HIV, liver disease, or kidney disease. No history of medication use including anabolic steroids, glucocorticoids, chronic pain medications, or history of chemotherapy. His sense of smell is intact. \par Family history is negative for infertility problems and low testosterone.\par \par

## 2022-07-27 LAB
BASOPHILS # BLD AUTO: 0.03 K/UL
BASOPHILS NFR BLD AUTO: 0.4 %
EOSINOPHIL # BLD AUTO: 0.09 K/UL
EOSINOPHIL NFR BLD AUTO: 1.1 %
HCT VFR BLD CALC: 47.7 %
HGB BLD-MCNC: 14.7 G/DL
IMM GRANULOCYTES NFR BLD AUTO: 0.4 %
LYMPHOCYTES # BLD AUTO: 2.74 K/UL
LYMPHOCYTES NFR BLD AUTO: 34.8 %
MAN DIFF?: NORMAL
MCHC RBC-ENTMCNC: 25.7 PG
MCHC RBC-ENTMCNC: 30.8 GM/DL
MCV RBC AUTO: 83.5 FL
MONOCYTES # BLD AUTO: 0.86 K/UL
MONOCYTES NFR BLD AUTO: 10.9 %
NEUTROPHILS # BLD AUTO: 4.13 K/UL
NEUTROPHILS NFR BLD AUTO: 52.4 %
OSMOLALITY UR: 714 MOSM/KG
PLATELET # BLD AUTO: 240 K/UL
PSA SERPL-MCNC: 0.42 NG/ML
RBC # BLD: 5.71 M/UL
RBC # FLD: 13.8 %
WBC # FLD AUTO: 7.88 K/UL

## 2022-08-04 ENCOUNTER — TRANSCRIPTION ENCOUNTER (OUTPATIENT)
Age: 27
End: 2022-08-04

## 2022-08-04 LAB
ALBUMIN SERPL ELPH-MCNC: 4.3 G/DL
ALP BLD-CCNC: 67 U/L
ALT SERPL-CCNC: 32 U/L
ANION GAP SERPL CALC-SCNC: 12 MMOL/L
AST SERPL-CCNC: 23 U/L
BILIRUB SERPL-MCNC: 0.6 MG/DL
BUN SERPL-MCNC: 15 MG/DL
CALCIUM SERPL-MCNC: 9.4 MG/DL
CHLORIDE SERPL-SCNC: 103 MMOL/L
CO2 SERPL-SCNC: 23 MMOL/L
CREAT SERPL-MCNC: 0.7 MG/DL
EGFR: 129 ML/MIN/1.73M2
GH SERPL-MCNC: 0.57 NG/ML
GLUCOSE SERPL-MCNC: 88 MG/DL
IGF-1 INTERP: NORMAL
IGF-I BLD-MCNC: 266 NG/ML
OSMOLALITY SERPL: 278 MOSMOL/KG
POTASSIUM SERPL-SCNC: 4.2 MMOL/L
PROLACTIN SERPL-MCNC: 3.5 NG/ML
PROT SERPL-MCNC: 6.9 G/DL
SHBG SERPL-SCNC: 10.1 NMOL/L
SODIUM SERPL-SCNC: 138 MMOL/L
T4 FREE SERPL-MCNC: 1.3 NG/DL
TESTOST FREE SERPL-MCNC: 8.9 PG/ML
TESTOST SERPL-MCNC: 290 NG/DL
TSH SERPL-ACNC: 3.87 UIU/ML

## 2022-08-05 LAB — T4 FREE SERPL DIALY-MCNC: 1.5 NG/DL

## 2022-08-08 ENCOUNTER — NON-APPOINTMENT (OUTPATIENT)
Age: 27
End: 2022-08-08

## 2022-08-09 ENCOUNTER — RX RENEWAL (OUTPATIENT)
Age: 27
End: 2022-08-09

## 2022-08-09 ENCOUNTER — APPOINTMENT (OUTPATIENT)
Dept: PULMONOLOGY | Facility: CLINIC | Age: 27
End: 2022-08-09

## 2022-08-09 VITALS
RESPIRATION RATE: 16 BRPM | SYSTOLIC BLOOD PRESSURE: 118 MMHG | OXYGEN SATURATION: 98 % | HEIGHT: 70 IN | WEIGHT: 315 LBS | BODY MASS INDEX: 45.1 KG/M2 | HEART RATE: 75 BPM | TEMPERATURE: 97.2 F | DIASTOLIC BLOOD PRESSURE: 70 MMHG

## 2022-08-09 DIAGNOSIS — Z71.89 OTHER SPECIFIED COUNSELING: ICD-10-CM

## 2022-08-09 PROCEDURE — 95012 NITRIC OXIDE EXP GAS DETER: CPT

## 2022-08-09 PROCEDURE — 94729 DIFFUSING CAPACITY: CPT

## 2022-08-09 PROCEDURE — 94618 PULMONARY STRESS TESTING: CPT

## 2022-08-09 PROCEDURE — 94010 BREATHING CAPACITY TEST: CPT

## 2022-08-09 PROCEDURE — 99214 OFFICE O/P EST MOD 30 MIN: CPT | Mod: 25

## 2022-08-09 PROCEDURE — 94727 GAS DIL/WSHOT DETER LNG VOL: CPT

## 2022-08-09 NOTE — ASSESSMENT
[FreeTextEntry1] : Mr. Mathur is a 27 year old male with a history of SOB, GERD, poor sleep, Tourette's syndrome, asthma, allergy, ADD, sleep apnea, ?RLS, insomnia, obesity, OCD, and anxiety presenting to the office for a follow up pulmonary re-evaluation - +food allergies. Dx of hypogonadism, hypothyroidism, pituitary tumor, allergies- improved - #1 is nocturnal sweats (much improved) \par \par ******************************************************Pre-op Clearance*************************************************************** \par \par His shortness of breath is multifactorial due to:\par -obesity/out of shape\par -poor breathing mechanics\par Less likely\par -?CAD\par -?asthma\par \par problem 1: STEPHANIE- stable \par -Settings: Dreamstation APAP CPAP mode of 10 cm H2O with FFM (Telepath)\par -off of Modafinil 200 mg QAM due to "crashing" \par -Sleep apnea is associated with adverse clinical consequences which an affect most organ systems.  Cardiovascular disease risk includes arrhythmias, atrial fibrillation, hypertension, coronary artery disease, and stroke. Metabolic disorders include diabetes type 2, non-alcoholic fatty liver disease. Mood disorder especially depression; and cognitive decline especially in the elderly. Associations with  chronic reflux/Leahy’s esophagus some but not all inclusive. \par -Reasons  include arousal consistent with hypopnea; respiratory events most prominent in REM sleep or supine position; therefore sleep staging and body position are important for accurate diagnosis and estimation of AHI. \par \par Problem 1A: Suspected COVID-19 infection (not found)\par -s/p COVID-19 vaccine x3 \par -s/p Alkaseltzer cold and flu \par -s/p quarantine for 10 days \par COVID-19 precautionary Immune Support Recommendations:\par -OTC Vitamin C 500mg BID \par -OTC Quercetin 250-500mg BID \par -OTC Zinc 75-100mg per day \par -OTC Melatonin 1 or 2mg a night \par -OTC Vitamin D 1-4000mg per day \par -OTC Tonic Water 8oz per day\par -Water 0.5-1 gallon per day\par Asthma and COVID19:\par You need to make sure your asthma is under control. This often requires the use of inhaled corticosteroids (and sometimes oral corticosteroids). Inhaled corticosteroids do not likely reduce your immune system’s ability to fight infections, but oral corticosteroids may. It is important to use the steps above to protect yourself to limit your exposure to any respiratory virus. \par \par Problem 1B: Pulmonary Pre-Op Clearance for Bariatric Surgery- 9/2022\par -at this point in time there are no absolute pulmonary contraindications to go forward with the planned procedure \par -at the time of surgery s/he should have optimal pain control, incentive spirometry, early ambulation, DVT and GI prophylaxis. \par \par problem 2: insomnia/poor sleep\par -Recommended to use Insomnitol (take 1st) / Requip .5 mg\par -recommended to use Sleep Guard (2nd, if he wakes up)\par -Recommended Perrigo OTC \par -recommended to use sunglasses 30 minutes before bedtime and to try room darkening window shades\par -Good sleep hygiene was encouraged including avoiding watching television an hour before bed, keeping caffeine at a low,  avoiding reading, television, or anything, in bed, no drinking any liquids three hours before bedtime, and only getting into bed when tired and ready for sleep. \par \par problem 3: RLS\par - He is s/p blood work, which revealed: ferritin level (normal), iron binding level (normal), testosterone level (low), TFT (normal) and vitamin D level (normal)\par - continue  Mirapex 0.5mg QHS\par -Restless Legs Syndrome (RLS), also known as Stewart-Ekbom Disease, is a common sleep -related movement disorder. About 1 in 10 adults in the [place] have problems from restless leg syndrome. It also can be seen in about 2% of children. Women are twice as likely as men to have RLS. People with RLS will have symptoms  most often during times when they are less active, especially at bedtime. RLS most often causes an overwhelming urge to move your legs and sometimes other parts of your body. This urge is associated with unpleasant sensations in different parts of th body. The symptoms can be mild to severe and can affect your ability to go to sleep and stay asleep. People with RLS often sleep less at night and feel more tired during the day. \par \par problem 4: abnormal PFTs-  c/w Asthma \par -MCT c/w asthma\par -confirms sleep apnea - inspiratory limb flattened\par -continue Breo 200 1 inhalation QD\par -continue Ventolin 2 puffs Q6H \par -continue Singulair 10 mg QHS \par \par problem 5: allergy sinus \par -continue Olopatadine 0.6% 1 sniff BID \par - add Clarinex 5 mg QAM\par Environmental measures for allergies were encouraged including mattress and pillow cover, air purifier, and environmental controls \par \par problem 6: residual fatigue\par -off Modafinil 200 mg QAM (5AM) - I-STOP reviewed due to "crashing" \par -endocrinology results f/u \par \par Problem 7: GERD\par - Continue Prilosec 20 mg QAM before breakfast\par -continue Pepcid 40mg QHS \par - Lindon Feldman - c/w mild GERD/HH \par \par Things to avoid including overeating, spicy foods, tight clothing, eating within three hours of bed, this list is not all inclusive. \par -Rule of 2s: avoid eating too much, eating too late, eating too spicy, eating two hours before bed\par -For treatment of reflux, possible options discussed including diet control, H2 blockers, PPIs, as well as coating motility agents discussed as treatment options. Timing of meals and proximity of last meal to sleep were discussed. If symptoms persist, a formal gastrointestinal evaluation is needed. \par \par problem 8: low testosterone (off Rx)\par -recommended supplemental Boron 6mg QD\par -Off testosterone Rx (Abelev)\par -Referred to endocrinologist for further evaluation (Abelev)\par - Referred to urologist (Dave Hoenig)\par \par problem 9: poor breathing mechanics\par -Recommended Wim Hof and Buteyko breathing techniques \par -Proper breathing techniques were reviewed with an emphasis of exhalation. Patient instructed to breath in for 1 second and out for four seconds. Patient was encouraged to not talk while walking. \par \par problem 10: obesity/out of shape\par - Mason Quinones 10 days detox \par - recommended to see Dr. Luca Singh\par -recommended to increase protein and decrease carbohydrates\par -recommended to reduce caffeine and increase water intake\par -Weight loss, exercise, and diet control were discussed and are highly encouraged. Treatment options were given such as, aqua therapy, and contacting a nutritionist. Recommended to use the elliptical, stationary bike, refrain from use of treadmill. Mindful eating was explained to the patient. Obesity is associated with worsening asthma, shortness of breath, and potential for cardiac disease, diabetes, and other underlying medical conditions.\par \par Problem 11a: COVID-19 precautionary Immune Support Recommendations:\par -OTC Vitamin C 500mg BID \par -OTC Quercetin 250-500mg BID \par -OTC Zinc 75-100mg per day \par -OTC Melatonin 1 or 2mg a night \par -OTC Vitamin D 1-4000mg per day \par -OTC Tonic Water 8oz per day\par -Water 0.5-1 gallon per day\par \par problem 11: health maintenance- [facility] Stretches \par -Nocturnal sweats - Recommend pharmacy consult regarding his medication\par -s/p Covid-19 vaccine Pfizer x3\par -s/p Influenza vaccine 09/2020. \par -recommended strep pneumonia vaccines: Prevnar-13 vaccine, followed by Pneumo vaccine 23 one year following\par -recommended early intervention for URIs\par -recommended regular osteoporosis evaluations\par -recommended early dermatological evaluations\par -recommended after the age of 50 to the age of 70, colonoscopy every 5 years \par  \par \par Follow up in 4 months \par Patient is encouraged to call with any changes, concerns or questions.

## 2022-08-09 NOTE — HISTORY OF PRESENT ILLNESS
[FreeTextEntry1] : Mr. Mathur is a 27 year old male with a history of abnormal PFTs, GERD, low testosterone, STEPHANIE on CPAP, overweight, poor sleep, RLS, and SOB presenting to the office today for a follow up visit. His chief complaint is\par - he notes feeling well in general\par - he notes energy level is good, 7-8/10\par - he notes going to the gym twice a week\par - he notes finding out he have herniated disc\par - he notes feeling better than \par - he notes having carpal tunnel \par - he denies swallowing issues\par - he notes sleeping is better \par - he notes getting 7-8 hours of sleep \par - he notes night sweats are significantly better \par \par \par - He denies any headaches, nausea, vomiting, fever, chills, sweats, chest pain, chest pressure, palpitations, SOB, coughing, wheezing, fatigue, diarrhea, constipation, dysphagia, myalgias, dizziness, leg swelling, leg pain, itchy eyes, itchy ears, heartburn, reflux, or sour taste in the mouth

## 2022-08-09 NOTE — PROCEDURE
[FreeTextEntry1] : Full PFT- spi reveals normal flows; FEV1 was 4.15L which is 92% of predicted; normal lung volumes; normal diffusion at 38.2, which is 97% of predicted; normal flow volume loop \par \par FENO was 30; a normal value being less than 25\par Fractional exhaled nitric oxide (FENO) is regarded as a simple, noninvasive method for assessing eosinophilic airway inflammation. Produced by a variety of cells within the lung, nitric oxide (NO) concentrations are generally low in healthy individuals. However, high concentrations of NO appear to be involved in nonspecific host defense mechanisms and chronic inflammatory diseases such as asthma. The American Thoracic Society (ATS) therefore has recommended using FENO to aid in the diagnosis and monitoring of eosinophilic airway inflammation and asthma, and for identifying steroid responsive individuals whose chronic respiratory symptoms may be caused by airway inflammation. \par \par Patient completed a 6-minute walk study in which the Lowest Pulse Ox was  97%; there was very slight evidence of dyspnea or fatigue. The patient walked 570.5 meters

## 2022-08-09 NOTE — ADDENDUM
[FreeTextEntry1] : Documented by Masood Foster acting as a scribe for Dr. Brant Marin on (08/09/2022).\par \par All medical record record entries made by the Scribe were at my, Dr. Brant Marin's, direction and personally dictated by me on (08/09/2022). I have reviewed the chart and agree that the record accurately reflects my personal performance of the history, physical exam, assessment and plan. I have personally directed, reviewed, and agree with the discharge instructions.

## 2022-08-12 ENCOUNTER — OUTPATIENT (OUTPATIENT)
Dept: OUTPATIENT SERVICES | Facility: HOSPITAL | Age: 27
LOS: 1 days | Discharge: HOME | End: 2022-08-12

## 2022-08-12 ENCOUNTER — RESULT REVIEW (OUTPATIENT)
Age: 27
End: 2022-08-12

## 2022-08-12 ENCOUNTER — APPOINTMENT (OUTPATIENT)
Dept: RADIOLOGY | Facility: HOSPITAL | Age: 27
End: 2022-08-12

## 2022-08-12 DIAGNOSIS — K21.9 GASTRO-ESOPHAGEAL REFLUX DISEASE WITHOUT ESOPHAGITIS: ICD-10-CM

## 2022-08-12 DIAGNOSIS — G47.33 OBSTRUCTIVE SLEEP APNEA (ADULT) (PEDIATRIC): ICD-10-CM

## 2022-08-12 PROCEDURE — 74240 X-RAY XM UPR GI TRC 1CNTRST: CPT | Mod: 26

## 2022-08-16 ENCOUNTER — APPOINTMENT (OUTPATIENT)
Age: 27
End: 2022-08-16

## 2022-08-16 ENCOUNTER — APPOINTMENT (OUTPATIENT)
Dept: BARIATRICS | Facility: CLINIC | Age: 27
End: 2022-08-16

## 2022-08-16 VITALS
SYSTOLIC BLOOD PRESSURE: 132 MMHG | WEIGHT: 315 LBS | OXYGEN SATURATION: 97 % | TEMPERATURE: 97.9 F | BODY MASS INDEX: 45.1 KG/M2 | HEIGHT: 70 IN | HEART RATE: 78 BPM | DIASTOLIC BLOOD PRESSURE: 79 MMHG

## 2022-08-16 DIAGNOSIS — E22.1 HYPERPROLACTINEMIA: ICD-10-CM

## 2022-08-16 PROCEDURE — 99213 OFFICE O/P EST LOW 20 MIN: CPT

## 2022-08-16 PROCEDURE — ZZZZZ: CPT | Mod: 1L

## 2022-08-17 NOTE — HISTORY OF PRESENT ILLNESS
[de-identified] : Patient is a 28 y/o man with a BMI of 46.93 and the following obesity related comorbidities: GERD, STEPHANIE. He has been struggling with weight for years and has failed multiple attempts at non-surgical weight loss options. He has tried the following diets and weight loss options: Nutritionist, Medically Supervised Weight Loss, Hypnosis/Acupuncture, Weight Watchers, Medications. He is currently in the process of preparing for bariatric surgery.\par \par  [de-identified] : Since his last visit with Dr. Carrillo: \par He has gained about 27 lbs. He is unsure how this happened, as he reports he has cut down on his intake. He does admit he has been less active due to lower extremity pain. He has been coming in to see the RD monthly. He is motivated to lose weight and go Bariatric surgery. He was taking Wegovy but had some loose BMs and it was stopped. He was supposed to start Plenity, but was unable to get it from the pharmacy. \par \par He was seen by Dr. Marin Pulmonologist 8/9 and cleared for Bariatric Surgery. \par He was seen by Dr. Mark Cardiologist 7/19 and cleared for Bariatric Surgery.\par He reports he was also seen by Dr. Frazier (GI) and underwent an EGD at the end of June, and was cleared for surgery. \par He has an appt with a Behavioral Health Specialist this Thursday 8/18. \par He is followed by Dr. Murdock for pituitary microadenoma, hypogonadism, and hypothyroidism.\par \par He is also being evaluated by Neuro due to some difficulty with his RLE when ambulating, ansomnia, and other neurosensory complaints. He is also being seen by Ortho for b/l carpal tunnel.\par

## 2022-08-17 NOTE — ADDENDUM
[FreeTextEntry1] : Per my discussion with Dr. Murdock, will do a trial of Mounjaro 2.5 mg weekly for IFG. \par Discussed with patient. \par Risks and benefits of therapy with Mounjaro were discussed. The proper way to take the medication, as well as, special considerations while on it were also discussed.

## 2022-08-17 NOTE — ASSESSMENT
[FreeTextEntry1] : 27 year old male with Class 3 Obesity. We discussed the importance of weight loss in the management of his comorbidities. We discussed the risks of continued excess weight on long term health. Explained to patient that given his current weight gain, we will postpone surgery until December. \par Will refocus efforts on weight loss. He was seen by the RD today (see attached notes). We discussed ways he can incorporate exercise into his daily routine given his physical limitations. \par Will speak with Dr. Murdock to restart Wegovy to help prevent further weight gain. \par \par f/u with RD in 2-3 weeks \par \par

## 2022-08-18 ENCOUNTER — APPOINTMENT (OUTPATIENT)
Dept: PSYCHIATRY | Facility: CLINIC | Age: 27
End: 2022-08-18

## 2022-08-18 DIAGNOSIS — Z71.89 OTHER SPECIFIED COUNSELING: ICD-10-CM

## 2022-08-18 PROCEDURE — 99205 OFFICE O/P NEW HI 60 MIN: CPT

## 2022-08-24 ENCOUNTER — APPOINTMENT (OUTPATIENT)
Dept: INTERNAL MEDICINE | Facility: CLINIC | Age: 27
End: 2022-08-24

## 2022-08-24 VITALS
OXYGEN SATURATION: 98 % | HEIGHT: 70 IN | TEMPERATURE: 98.2 F | BODY MASS INDEX: 45.1 KG/M2 | DIASTOLIC BLOOD PRESSURE: 73 MMHG | SYSTOLIC BLOOD PRESSURE: 133 MMHG | WEIGHT: 315 LBS | HEART RATE: 86 BPM

## 2022-08-24 DIAGNOSIS — R60.0 LOCALIZED EDEMA: ICD-10-CM

## 2022-08-24 PROCEDURE — 99213 OFFICE O/P EST LOW 20 MIN: CPT | Mod: 25

## 2022-08-24 PROCEDURE — 36415 COLL VENOUS BLD VENIPUNCTURE: CPT

## 2022-08-24 NOTE — HISTORY OF PRESENT ILLNESS
[FreeTextEntry8] : CC: Bilateral leg edema\par \par Notes having bilateral edema which worsens throughout the day. Denies high salt intake.\par Recently started Mounjaro for weight loss one week ago. \par Denies MAO or orthopnea.\par

## 2022-08-24 NOTE — PHYSICAL EXAM
[Normal] : normal rate, regular rhythm, normal S1 and S2 and no murmur heard [de-identified] : b/l trace edema

## 2022-08-24 NOTE — REVIEW OF SYSTEMS
[Recent Change In Weight] : ~T recent weight change [Lower Ext Edema] : lower extremity edema [Chest Pain] : no chest pain [Palpitations] : no palpitations

## 2022-08-24 NOTE — PLAN
[FreeTextEntry1] : ?venous insufficiency vs medication induced\par Low salt intake, hydration, encouraged ambulation, elevation of legs

## 2022-08-26 ENCOUNTER — TRANSCRIPTION ENCOUNTER (OUTPATIENT)
Age: 27
End: 2022-08-26

## 2022-08-26 LAB
ALBUMIN SERPL ELPH-MCNC: 4.8 G/DL
ALP BLD-CCNC: 80 U/L
ALT SERPL-CCNC: 40 U/L
ANION GAP SERPL CALC-SCNC: 11 MMOL/L
APPEARANCE: CLEAR
AST SERPL-CCNC: 37 U/L
BILIRUB SERPL-MCNC: 1 MG/DL
BILIRUBIN URINE: NEGATIVE
BLOOD URINE: NEGATIVE
BUN SERPL-MCNC: 16 MG/DL
CALCIUM SERPL-MCNC: 9.9 MG/DL
CHLORIDE SERPL-SCNC: 102 MMOL/L
CO2 SERPL-SCNC: 24 MMOL/L
COLOR: YELLOW
CREAT SERPL-MCNC: 0.81 MG/DL
EGFR: 123 ML/MIN/1.73M2
GLUCOSE QUALITATIVE U: NEGATIVE
GLUCOSE SERPL-MCNC: 97 MG/DL
KETONES URINE: NEGATIVE
LEUKOCYTE ESTERASE URINE: NEGATIVE
NITRITE URINE: NEGATIVE
PH URINE: 6
POTASSIUM SERPL-SCNC: 4.5 MMOL/L
PROT SERPL-MCNC: 7.1 G/DL
PROTEIN URINE: NORMAL
SODIUM SERPL-SCNC: 137 MMOL/L
SPECIFIC GRAVITY URINE: 1.02
TSH SERPL-ACNC: 1.62 UIU/ML
UROBILINOGEN URINE: NORMAL

## 2022-08-29 ENCOUNTER — TRANSCRIPTION ENCOUNTER (OUTPATIENT)
Age: 27
End: 2022-08-29

## 2022-09-06 ENCOUNTER — TRANSCRIPTION ENCOUNTER (OUTPATIENT)
Age: 27
End: 2022-09-06

## 2022-09-06 ENCOUNTER — APPOINTMENT (OUTPATIENT)
Dept: BARIATRICS | Facility: CLINIC | Age: 27
End: 2022-09-06

## 2022-09-08 ENCOUNTER — TRANSCRIPTION ENCOUNTER (OUTPATIENT)
Age: 27
End: 2022-09-08

## 2022-09-08 ENCOUNTER — APPOINTMENT (OUTPATIENT)
Age: 27
End: 2022-09-08

## 2022-09-08 VITALS
DIASTOLIC BLOOD PRESSURE: 72 MMHG | BODY MASS INDEX: 43.4 KG/M2 | OXYGEN SATURATION: 97 % | WEIGHT: 303.13 LBS | HEART RATE: 95 BPM | TEMPERATURE: 97.3 F | SYSTOLIC BLOOD PRESSURE: 114 MMHG | HEIGHT: 70 IN

## 2022-09-08 PROCEDURE — ZZZZZ: CPT

## 2022-09-10 ENCOUNTER — TRANSCRIPTION ENCOUNTER (OUTPATIENT)
Age: 27
End: 2022-09-10

## 2022-09-14 ENCOUNTER — TRANSCRIPTION ENCOUNTER (OUTPATIENT)
Age: 27
End: 2022-09-14

## 2022-09-15 ENCOUNTER — TRANSCRIPTION ENCOUNTER (OUTPATIENT)
Age: 27
End: 2022-09-15

## 2022-10-06 ENCOUNTER — APPOINTMENT (OUTPATIENT)
Age: 27
End: 2022-10-06

## 2022-10-06 VITALS
BODY MASS INDEX: 42.53 KG/M2 | WEIGHT: 297.06 LBS | OXYGEN SATURATION: 96 % | SYSTOLIC BLOOD PRESSURE: 128 MMHG | TEMPERATURE: 97.2 F | HEART RATE: 78 BPM | HEIGHT: 70 IN | DIASTOLIC BLOOD PRESSURE: 74 MMHG

## 2022-10-06 PROCEDURE — ZZZZZ: CPT

## 2022-10-18 ENCOUNTER — TRANSCRIPTION ENCOUNTER (OUTPATIENT)
Age: 27
End: 2022-10-18

## 2022-10-20 ENCOUNTER — TRANSCRIPTION ENCOUNTER (OUTPATIENT)
Age: 27
End: 2022-10-20

## 2022-11-01 ENCOUNTER — RX RENEWAL (OUTPATIENT)
Age: 27
End: 2022-11-01

## 2022-11-02 ENCOUNTER — APPOINTMENT (OUTPATIENT)
Age: 27
End: 2022-11-02

## 2022-11-02 VITALS
HEART RATE: 92 BPM | TEMPERATURE: 98.4 F | BODY MASS INDEX: 40.94 KG/M2 | DIASTOLIC BLOOD PRESSURE: 84 MMHG | HEIGHT: 70 IN | OXYGEN SATURATION: 98 % | WEIGHT: 286 LBS | SYSTOLIC BLOOD PRESSURE: 135 MMHG

## 2022-11-02 PROCEDURE — 99213 OFFICE O/P EST LOW 20 MIN: CPT

## 2022-11-02 PROCEDURE — ZZZZZ: CPT

## 2022-11-02 PROCEDURE — G0447 BEHAVIOR COUNSEL OBESITY 15M: CPT | Mod: 59

## 2022-11-06 ENCOUNTER — NON-APPOINTMENT (OUTPATIENT)
Age: 27
End: 2022-11-06

## 2022-11-09 NOTE — ASSESSMENT
[FreeTextEntry1] : 27 year old male with Class 3 Obesity. Celebrated patient's weight loss. He is motivated to lose more weight. Will continue to focus on following a healthy lifestyle. At least 15 minutes was spent during the visit on obesity counseling. \par Continue Mounjaro 2.5 mg weekly. Will stop before surgery.\par \par Patient pending abd sono and ugger gi series.\par Will also need to update labs. Patient will do with Dr. Murdock at upcoming visit. \par \par f/u with MEJIA and Dr. Carrillo in 1 month\par \par

## 2022-11-09 NOTE — HISTORY OF PRESENT ILLNESS
[de-identified] : Patient is a 28 y/o man with a BMI of 46.93 and the following obesity related comorbidities: GERD, STEPHANIE. He has been struggling with weight for years and has failed multiple attempts at non-surgical weight loss options. He has tried the following diets and weight loss options: Nutritionist, Medically Supervised Weight Loss, Hypnosis/Acupuncture, Weight Watchers, Medications. He is currently in the process of preparing for bariatric surgery.\par \par  [de-identified] : Since his last visit in August he has lost 40 lbs. He has made significant changes to his diet. He has also started exercising regularly. He has been coming in to see the RD monthly. He is motivated to lose weight and have Bariatric surgery. He was taking Wegovy but had some loose BMs and it was stopped. He was supposed to start Plenity, but was unable to get it from the pharmacy. Presently taking Mounjaro 2.5 mg weekly and feels well. \par \par He was seen by Dr. Marin Pulmonologist 8/9 and cleared for Bariatric Surgery. \par He was seen by Dr. Mark Cardiologist 7/19 and cleared for Bariatric Surgery.\par He reports he was also seen by Dr. Frazier (GI) and underwent an EGD at the end of June, and was cleared for surgery. \par He reports he was seen a Behavioral Health Specialist this 8/18 and cleared for surgery\par He is followed by Dr. Murdock for pituitary microadenoma, hypogonadism, and hypothyroidism. Has upcoming appt\par \par He is also being evaluated by Neuro due to some difficulty with his RLE when ambulating, ansomnia, and other neurosensory complaints. He is also being seen by Ortho for b/l carpal tunnel.\par

## 2022-11-15 ENCOUNTER — APPOINTMENT (OUTPATIENT)
Dept: ENDOCRINOLOGY | Facility: CLINIC | Age: 27
End: 2022-11-15

## 2022-11-15 VITALS
BODY MASS INDEX: 40.37 KG/M2 | SYSTOLIC BLOOD PRESSURE: 130 MMHG | HEART RATE: 80 BPM | OXYGEN SATURATION: 98 % | HEIGHT: 70 IN | DIASTOLIC BLOOD PRESSURE: 76 MMHG | WEIGHT: 282 LBS | RESPIRATION RATE: 16 BRPM | TEMPERATURE: 98 F

## 2022-11-15 DIAGNOSIS — E34.9 ENDOCRINE DISORDER, UNSPECIFIED: ICD-10-CM

## 2022-11-15 PROCEDURE — 36415 COLL VENOUS BLD VENIPUNCTURE: CPT

## 2022-11-15 PROCEDURE — 99214 OFFICE O/P EST MOD 30 MIN: CPT | Mod: 25

## 2022-11-15 NOTE — REASON FOR VISIT
[Follow - Up] : a follow-up visit [Hypothyroidism] : hypothyroidism [Pituitary Evaluation/ Disorder] : pituitary evaluation/disorder [Weight Management/Obesity] : weight management/obesity [Hypogonadism] : hypogonadism

## 2022-11-15 NOTE — HISTORY OF PRESENT ILLNESS
[FreeTextEntry1] : 27 year  Male f/u for multiple medical issues\par \par *** Nov 15, 2022 ***\par \par feels great. on Mounjaro 2.5 mg qw. Lost  more than 40 lbs so far\par being seen by  a bariatric team\par off abilify and lexapro\par remains on L-thyroxine 50 mcg, Xyosted 75 mg qw (Wed). good erections and sex drive, energy improved drastically. night sweats are gone\par \par *** Jul 26, 2022 ***\par \par off Wegovy. Did not try Plenity yet\par seeing a bariatric team. wt fluctuates\par for possible sleeve in september\par not taking haldol, take abilify\par back on Xyosted 75 mg qw (on Wed) - night sweats resolved, energy improved a lot. sex drive/ erections good\par also, on L-thyroxine 50 mcg\par \par *** Mar 16, 2022 ***\par \par on Wegovy 1 mg qw, more flatulence and loose BM on this dose. No weight loss at all.\par taking L-thyroxine 50 mcg qd\par Pit MRI (2/19/22)- asymmetry of the pit gland. no lesions seen comparing to prior examinations. Stalk in midline, normal optic chiasm\par \par *** Dec 13, 2021 ***\par \par feels well, but regained few lbs\par wants to wait with a bariatric sx since overall lost weight but is more open to weight loss meds\par did not repeat labs yet\par off TRT\par less tired, libido improved with weight loss. denies ED\par \par *** Sep 09, 2021 ***\par \par feels much better, lost 40 lbs on the diet. seeing a dietician. saw Dr. Gambino, but wants to hold on with the sx for now b/o wt loss\par off TRT for about two month (b/o insurance issues), but is not feeling tired, denies ED/libido changes\par taking L-thyroxine 50 mcg\par \par *** Apr 19, 2021 ***\par \par under stress. father passed away from GB. \par to see Dr. Gambino next month\par on Axiron 2 depressions daily, L-thyroxine 50 mcg\par with fatigue and night time sweating. seeing pulmonary. Libido is ok, but not great. \par Pit MRI (2/28/21)- pit microadenoma is less conspicuous  than on the prior exam. infundibulum is slightly deviated ti the left\par \par *** Televisit  Jan 13, 2021 ***\par \par under stress. Father with mts brain cancer, in the hospice now.\par otherwise , feels a lot better since starting TRT. More energetic, no need to take daytime naps anymore. Libido is still on a lower side, but attributes to his stress.\par currently on Axiron 2 depressions daily, L-thyroxine 50 mcg\par no recent labs\par \par \par *** Oct 12, 2020 ***\par \par off Haldol for about 2 weeks. now, on Abilify 5 mg. did not start dostinex b/o was on haldol at that time\par 2hr OGTT- GH < 0.03, glucose- 105\par \par states that had an increased thirst within past year, drinking about a gallon of cold water a day. Urinates at least 10 times a day, but no nocturia. no pit issues in the family. 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 He's been treated with CPAP which helped with sleeping, but his fatigue did not improve much. He was told that his testosterone was low in the past, but he did not take any testosterone supplements. He was also diagnosed with hypothyroidism about 2 years ago, and he's been on a stable dose of L-thyroxine 50 mcg qd.\par He denies ED and his spontaneous am erections are more or less preserved. However, his libido has decreased a lot. Shaving is well preserved. He is not in relationship right now; masturbates once every other week because of the low desire. Denies visual disturbances or headaches, dizziness, nausea, vomiting, abdominal pain,  breast discharge, worsening in peripheral vision. \par He underwent puberty at a normal rate compared with his peers. No history of learning disabilities or behavioral disorders. No history of insults to the brain or testes, including surgery, trauma, tumors, or radiation exposure. \par No history of diabetes, HIV, liver disease, or kidney disease. No history of medication use including anabolic steroids, glucocorticoids, chronic pain medications, or history of chemotherapy. His sense of smell is intact. \par Family history is negative for infertility problems and low testosterone.\par \par

## 2022-11-15 NOTE — ASSESSMENT
[FreeTextEntry1] : 1. Pit microadenoma. Hypogonadism\par - repeat fasting  testo, CBC, liver functions, PSA, thyroid panel\par - might be able to observe off TRT since I'd expect an improvement in T levels with wt loss\par - r/o CDI given his clinical presentation and microadenoma in the posterior pituitary. monitor urine lytes/ serum/urine osm\par - since off haldol, might consider dostinex if needed \par - repeat  pituitary MRI in 02/23\par - cont Xyosted 75 mg qw, pending labs\par \par 2. Hypothyroidism\par - cont  L-thyroxine 50 mcg, pending labs\par - Proper intake of levothyroxine is reviewed in details, including on an empty stomach, with water only, at least one hour before taking any medications, vitamins, or supplements and three-four hours before taking iron or calcium.\par \par 3. Morbid obesity\par - Failed and intolerant of Wegovy. Excellent response on Mounjaro, cont present dose for now. Will discuss with a bariatric team re: uptitration since he's planned for a surgery\par - bariatric f/u\par RTC 3 mos, or sooner prn

## 2022-11-16 ENCOUNTER — TRANSCRIPTION ENCOUNTER (OUTPATIENT)
Age: 27
End: 2022-11-16

## 2022-11-16 LAB
25(OH)D3 SERPL-MCNC: 31.5 NG/ML
ALBUMIN SERPL ELPH-MCNC: 4.5 G/DL
ALP BLD-CCNC: 85 U/L
ALT SERPL-CCNC: 32 U/L
ANION GAP SERPL CALC-SCNC: 13 MMOL/L
AST SERPL-CCNC: 39 U/L
BASOPHILS # BLD AUTO: 0.01 K/UL
BASOPHILS NFR BLD AUTO: 0.1 %
BILIRUB SERPL-MCNC: 0.8 MG/DL
BUN SERPL-MCNC: 11 MG/DL
CALCIUM SERPL-MCNC: 9.7 MG/DL
CHLORIDE SERPL-SCNC: 102 MMOL/L
CHOLEST SERPL-MCNC: 123 MG/DL
CO2 SERPL-SCNC: 22 MMOL/L
CREAT SERPL-MCNC: 0.85 MG/DL
EGFR: 121 ML/MIN/1.73M2
EOSINOPHIL # BLD AUTO: 0.12 K/UL
EOSINOPHIL NFR BLD AUTO: 1.7 %
ESTIMATED AVERAGE GLUCOSE: 100 MG/DL
GLUCOSE SERPL-MCNC: 91 MG/DL
HBA1C MFR BLD HPLC: 5.1 %
HCT VFR BLD CALC: 48.4 %
HDLC SERPL-MCNC: 33 MG/DL
HGB BLD-MCNC: 15 G/DL
IMM GRANULOCYTES NFR BLD AUTO: 0.1 %
LDLC SERPL CALC-MCNC: 72 MG/DL
LYMPHOCYTES # BLD AUTO: 2.16 K/UL
LYMPHOCYTES NFR BLD AUTO: 30.7 %
MAN DIFF?: NORMAL
MCHC RBC-ENTMCNC: 27.5 PG
MCHC RBC-ENTMCNC: 31 GM/DL
MCV RBC AUTO: 88.8 FL
MONOCYTES # BLD AUTO: 0.72 K/UL
MONOCYTES NFR BLD AUTO: 10.2 %
NEUTROPHILS # BLD AUTO: 4.01 K/UL
NEUTROPHILS NFR BLD AUTO: 57.2 %
NONHDLC SERPL-MCNC: 90 MG/DL
OSMOLALITY SERPL: 283 MOSMOL/KG
OSMOLALITY UR: 909 MOSM/KG
PLATELET # BLD AUTO: 255 K/UL
POTASSIUM SERPL-SCNC: 4.7 MMOL/L
PROLACTIN SERPL-MCNC: 14.6 NG/ML
PROT SERPL-MCNC: 6.9 G/DL
PSA SERPL-MCNC: 0.48 NG/ML
RBC # BLD: 5.45 M/UL
RBC # FLD: 13.7 %
SHBG SERPL-SCNC: 14.2 NMOL/L
SODIUM SERPL-SCNC: 137 MMOL/L
T4 FREE SERPL-MCNC: 1.4 NG/DL
TRIGL SERPL-MCNC: 91 MG/DL
TSH SERPL-ACNC: 2.72 UIU/ML
WBC # FLD AUTO: 7.03 K/UL

## 2022-11-18 ENCOUNTER — RX RENEWAL (OUTPATIENT)
Age: 27
End: 2022-11-18

## 2022-11-18 LAB
TESTOST FREE SERPL-MCNC: 20.1 PG/ML
TESTOST SERPL-MCNC: 444 NG/DL

## 2022-11-22 ENCOUNTER — TRANSCRIPTION ENCOUNTER (OUTPATIENT)
Age: 27
End: 2022-11-22

## 2022-11-23 ENCOUNTER — TRANSCRIPTION ENCOUNTER (OUTPATIENT)
Age: 27
End: 2022-11-23

## 2022-12-02 ENCOUNTER — TRANSCRIPTION ENCOUNTER (OUTPATIENT)
Age: 27
End: 2022-12-02

## 2022-12-07 ENCOUNTER — APPOINTMENT (OUTPATIENT)
Dept: BARIATRICS | Facility: CLINIC | Age: 27
End: 2022-12-07

## 2022-12-07 ENCOUNTER — TRANSCRIPTION ENCOUNTER (OUTPATIENT)
Age: 27
End: 2022-12-07

## 2022-12-07 VITALS
WEIGHT: 279.38 LBS | HEIGHT: 70 IN | BODY MASS INDEX: 39.99 KG/M2 | HEART RATE: 82 BPM | DIASTOLIC BLOOD PRESSURE: 80 MMHG | SYSTOLIC BLOOD PRESSURE: 125 MMHG | OXYGEN SATURATION: 98 % | TEMPERATURE: 98.1 F

## 2022-12-07 PROCEDURE — ZZZZZ: CPT

## 2022-12-07 PROCEDURE — 99213 OFFICE O/P EST LOW 20 MIN: CPT

## 2022-12-07 NOTE — PLAN
[FreeTextEntry1] : Sleeve gastrectomy recommended.  Patient understands and wishes to think about surgery a little more prior to scehduling.  He will communicate with surgeon this week to determine if wishes to proceed.\par \par Instructed to continue dietary counseling and attend support group.\par \par 20 minutes spent with patient

## 2022-12-07 NOTE — HISTORY OF PRESENT ILLNESS
[de-identified] : Patient is a 26 y/o man with morbid obesity and a BMI of 40 complicated by STEPHANIE requiring CPAP. He has tried numerous nonsurgical methods for weight loss including diets, exercise, and lifestyle modifications.  He has received individualized nutritional counseling specific for bariatric surgery, has undergone a psychosocial evaluation, which did not reveal any behavioral contraindication to surgery, and has been evaluated by a bariatric nurse practitioner for evaluation of her medical fitness for surgery as well as specific surgical counseling. \par \par The patient has discussed their long term goals following bariatric surgery, including but not limited to weight loss, improved health and quality of life. The patient understands their role in adhering to dietary and lifestyle recommendations made by our team, in order to achieve their stated long term goals.  \par \par PCP - Letter of Support Complete\par Pulm - No contraindication to surgery; on CPAP 10 cm H2O\par Card-  No contraindications to surgery/low risk\par EGD - Grade A Esophagitis\par BH - No contraindications to surgery\par \par \par He is currently on mounjaro with good results but is having difficulty with insurance coverage.\par Upper GI - Normal\par Uz - No gallstones, Fatty Liver Disease\par

## 2022-12-07 NOTE — ASSESSMENT
[FreeTextEntry1] : Pt is a 26 y/o man with morbid obesity ready for bariatric surgery. He has undergone multidisciplinary evaluation by multiple medical subspecialties including psychiatry, cardiology, pulmonology, and gastroenterology. He has been deemed a suitable candidate for bariatric surgery.

## 2022-12-07 NOTE — REASON FOR VISIT
[Follow-Up Visit] : a follow-up visit for [FreeTextEntry2] : Class III Obesity with STEPHANIE (CPAP)

## 2022-12-08 ENCOUNTER — APPOINTMENT (OUTPATIENT)
Dept: FAMILY MEDICINE | Facility: CLINIC | Age: 27
End: 2022-12-08

## 2022-12-08 VITALS
OXYGEN SATURATION: 98 % | DIASTOLIC BLOOD PRESSURE: 80 MMHG | BODY MASS INDEX: 39.8 KG/M2 | SYSTOLIC BLOOD PRESSURE: 122 MMHG | HEIGHT: 70 IN | TEMPERATURE: 98 F | WEIGHT: 278 LBS | HEART RATE: 84 BPM

## 2022-12-08 DIAGNOSIS — Z00.00 ENCOUNTER FOR GENERAL ADULT MEDICAL EXAMINATION W/OUT ABNORMAL FINDINGS: ICD-10-CM

## 2022-12-08 PROCEDURE — 99395 PREV VISIT EST AGE 18-39: CPT

## 2022-12-09 ENCOUNTER — APPOINTMENT (OUTPATIENT)
Dept: PULMONOLOGY | Facility: CLINIC | Age: 27
End: 2022-12-09

## 2022-12-09 ENCOUNTER — NON-APPOINTMENT (OUTPATIENT)
Age: 27
End: 2022-12-09

## 2022-12-09 VITALS
HEART RATE: 83 BPM | BODY MASS INDEX: 39.8 KG/M2 | WEIGHT: 278 LBS | DIASTOLIC BLOOD PRESSURE: 80 MMHG | TEMPERATURE: 97.2 F | SYSTOLIC BLOOD PRESSURE: 124 MMHG | OXYGEN SATURATION: 98 % | HEIGHT: 70 IN | RESPIRATION RATE: 16 BRPM

## 2022-12-09 DIAGNOSIS — U07.1 COVID-19: ICD-10-CM

## 2022-12-09 PROCEDURE — 95012 NITRIC OXIDE EXP GAS DETER: CPT

## 2022-12-09 PROCEDURE — 99214 OFFICE O/P EST MOD 30 MIN: CPT | Mod: 25

## 2022-12-09 RX ORDER — BROMPHENIRAMINE MALEATE, PSEUDOEPHEDRINE HYDROCHLORIDE, 2; 30; 10 MG/5ML; MG/5ML; MG/5ML
30-2-10 SYRUP ORAL
Qty: 280 | Refills: 0 | Status: DISCONTINUED | COMMUNITY
Start: 2022-11-07

## 2022-12-09 NOTE — ASSESSMENT
[FreeTextEntry1] : Mr. Mathur is a 27 year old male with a history of SOB, GERD, poor sleep, Tourette's syndrome, asthma, allergy, ADD, sleep apnea, ?RLS, insomnia, obesity, OCD, and anxiety presenting to the office for a follow up pulmonary re-evaluation - +food allergies. Dx of hypogonadism, hypothyroidism, pituitary tumor, allergies- improved - #1 is post covid asthma 11/2022\par \par ******************************************************Pre-op Clearance*************************************************************** \par \par His shortness of breath is multifactorial due to:\par -obesity/out of shape\par -poor breathing mechanics\par Less likely\par -?CAD\par -?asthma\par \par problem 1: STEPHANIE- stable \par -Settings: Dreamstation APAP CPAP mode of 10 cm H2O with FFM ([employer])\par -off of Modafinil 200 mg QAM due to "crashing" \par -Sleep apnea is associated with adverse clinical consequences which an affect most organ systems.  Cardiovascular disease risk includes arrhythmias, atrial fibrillation, hypertension, coronary artery disease, and stroke. Metabolic disorders include diabetes type 2, non-alcoholic fatty liver disease. Mood disorder especially depression; and cognitive decline especially in the elderly. Associations with  chronic reflux/Leahy’s esophagus some but not all inclusive. \par -Reasons  include arousal consistent with hypopnea; respiratory events most prominent in REM sleep or supine position; therefore sleep staging and body position are important for accurate diagnosis and estimation of AHI. \par \par Problem 1A: Suspected COVID-19 infection (not found)\par -s/p COVID-19 vaccine x3 \par -s/p Alkaseltzer cold and flu \par -s/p quarantine for 10 days \par COVID-19 precautionary Immune Support Recommendations:\par -OTC Vitamin C 500mg BID \par -OTC Quercetin 250-500mg BID \par -OTC Zinc 75-100mg per day \par -OTC Melatonin 1 or 2mg a night \par -OTC Vitamin D 1-4000mg per day \par -OTC Tonic Water 8oz per day\par -Water 0.5-1 gallon per day\par Asthma and COVID19:\par You need to make sure your asthma is under control. This often requires the use of inhaled corticosteroids (and sometimes oral corticosteroids). Inhaled corticosteroids do not likely reduce your immune system’s ability to fight infections, but oral corticosteroids may. It is important to use the steps above to protect yourself to limit your exposure to any respiratory virus. \par \par Problem 1B: Pulmonary Pre-Op Clearance for Bariatric Surgery- 9/2022\par -at this point in time there are no absolute pulmonary contraindications to go forward with the planned procedure \par -at the time of surgery s/he should have optimal pain control, incentive spirometry, early ambulation, DVT and GI prophylaxis. \par \par problem 2: insomnia/poor sleep (improved)\par -Recommended to use Insomnitol (take 1st) / Requip .5 mg\par -recommended to use Sleep Guard (2nd, if he wakes up)\par -Recommended Perrigo OTC \par -recommended to use sunglasses 30 minutes before bedtime and to try room darkening window shades\par -Good sleep hygiene was encouraged including avoiding watching television an hour before bed, keeping caffeine at a low,  avoiding reading, television, or anything, in bed, no drinking any liquids three hours before bedtime, and only getting into bed when tired and ready for sleep. \par \par problem 3: RLS\par - He is s/p blood work, which revealed: ferritin level (normal), iron binding level (normal), testosterone level (low), TFT (normal) and vitamin D level (normal)\par - continue  Mirapex 0.5mg QHS\par -Restless Legs Syndrome (RLS), also known as Stewart-Ekbom Disease, is a common sleep -related movement disorder. About 1 in 10 adults in the [place] have problems from restless leg syndrome. It also can be seen in about 2% of children. Women are twice as likely as men to have RLS. People with RLS will have symptoms  most often during times when they are less active, especially at bedtime. RLS most often causes an overwhelming urge to move your legs and sometimes other parts of your body. This urge is associated with unpleasant sensations in different parts of th body. The symptoms can be mild to severe and can affect your ability to go to sleep and stay asleep. People with RLS often sleep less at night and feel more tired during the day. \par \par problem 4: abnormal PFTs-  c/w Asthma \par -MCT c/w asthma\par -confirms sleep apnea - inspiratory limb flattened\par -continue Breo 200 1 inhalation QD or Symbicort 160 2 inhalations BID \par -continue Ventolin 2 puffs Q6H \par -continue Singulair 10 mg QHS \par \par problem 5: allergy sinus \par -continue Olopatadine 0.6% 1 sniff BID \par - add Clarinex 5 mg QAM\par Environmental measures for allergies were encouraged including mattress and pillow cover, air purifier, and environmental controls \par \par problem 6: residual fatigue\par -off Modafinil 200 mg QAM (5AM) - I-STOP reviewed due to "crashing" \par -endocrinology results f/u \par \par Problem 7: GERD\par - Continue Prilosec 20 mg QAM before breakfast\par -continue Pepcid 40mg QHS \par - Willmar Feldman - c/w mild GERD/HH \par \par Things to avoid including overeating, spicy foods, tight clothing, eating within three hours of bed, this list is not all inclusive. \par -Rule of 2s: avoid eating too much, eating too late, eating too spicy, eating two hours before bed\par -For treatment of reflux, possible options discussed including diet control, H2 blockers, PPIs, as well as coating motility agents discussed as treatment options. Timing of meals and proximity of last meal to sleep were discussed. If symptoms persist, a formal gastrointestinal evaluation is needed. \par \par problem 8: low testosterone (off Rx)\par -recommended supplemental Boron 6mg QD\par -Off testosterone Rx (Abelev)\par -Referred to endocrinologist for further evaluation (Abelev)\par - Referred to urologist (Galen Hoenig)\par \par problem 9: poor breathing mechanics\par -Recommended Wim Hof and Buteyko breathing techniques \par -Proper breathing techniques were reviewed with an emphasis of exhalation. Patient instructed to breath in for 1 second and out for four seconds. Patient was encouraged to not talk while walking. \par \par problem 10: obesity/out of shape\par - [facility] 10 days detox \par - recommended to see Dr. Luca Singh\par -recommended to increase protein and decrease carbohydrates\par -recommended to reduce caffeine and increase water intake\par -Weight loss, exercise, and diet control were discussed and are highly encouraged. Treatment options were given such as, aqua therapy, and contacting a nutritionist. Recommended to use the elliptical, stationary bike, refrain from use of treadmill. Mindful eating was explained to the patient. Obesity is associated with worsening asthma, shortness of breath, and potential for cardiac disease, diabetes, and other underlying medical conditions.\par \par Problem 11a: COVID-19 precautionary Immune Support Recommendations:\par -s/p COVID-19 11/2022\par -OTC Vitamin C 500mg BID \par -OTC Quercetin 250-500mg BID \par -OTC Zinc 75-100mg per day \par -OTC Melatonin 1 or 2mg a night \par -OTC Vitamin D 1-4000mg per day \par -OTC Tonic Water 8oz per day\par -Water 0.5-1 gallon per day\par \par problem 11: health maintenance- [facility] Stretches \par -Nocturnal sweats - Recommend pharmacy consult regarding his medication\par -s/p Covid-19 vaccine Pfizer x3\par -s/p Influenza vaccine 09/2020. \par -recommended strep pneumonia vaccines: Prevnar-13 vaccine, followed by Pneumo vaccine 23 one year following\par -recommended early intervention for URIs\par -recommended regular osteoporosis evaluations\par -recommended early dermatological evaluations\par -recommended after the age of 50 to the age of 70, colonoscopy every 5 years \par  \par \par Follow up in 4 months \par Patient is encouraged to call with any changes, concerns or questions.

## 2022-12-09 NOTE — HISTORY OF PRESENT ILLNESS
[FreeTextEntry1] : Mr. Mathur is a 27 year old male with a history of abnormal PFTs, GERD, low testosterone, STEPHANIE on CPAP, overweight, poor sleep, RLS, and SOB presenting to the office today for a follow up visit. His chief complaint is\par \par -he notes losing weight (50 lbs)\par -he notes feeling generally well \par -he notes diet is good \par -he notes drinking 20 oz coffee daily\par -he notes heartburn is controlled\par -he notes exercising (gym 3x a week, recreational sports)\par -s/p COVID-19 11/2022 (mild sx)\par -he notes he had mild residual breathing issues\par -he notes good quality of sleep \par -he notes intermittent nocturnal sweats, though improved\par \par -he denies any headaches, nausea, vomiting, fever, chills, chest pain, chest pressure, wheezing, palpitations, constipation, diarrhea, dizziness, dysphagia, heartburn, reflux, itchy eyes, itchy ears, leg swelling, leg pain, arthralgias, myalgias, or sour taste in the mouth.

## 2022-12-09 NOTE — ADDENDUM
[FreeTextEntry1] : Documented by NICKY Schmidt acting as a scribe for Dr. Brant Marin on 12/09/2022 .\par \par All medical record entries made by the Scribe were at my, Dr. Brant Marin's, direction and personally dictated by me on 12/09/2022. I have reviewed the chart and agree that the record accurately reflects my personal performance of the history, physical exam, assessment and plan. I have also personally directed, reviewed, and agree with the discharge instructions.

## 2022-12-09 NOTE — PROCEDURE
[FreeTextEntry1] : PFT reveals normal flows, with an FEV1 of  4.17L, which is 92% of predicted, with a normal flow volume loop. \par \par FENO was 26; a normal value being less than 25\par Fractional exhaled nitric oxide (FENO) is regarded as a simple, noninvasive method for assessing eosinophilic airway inflammation. Produced by a variety of cells within the lung, nitric oxide (NO) concentrations are generally low in healthy individuals. However, high concentrations of NO appear to be involved in nonspecific host defense mechanisms and chronic inflammatory diseases such as asthma. The American Thoracic Society (ATS) therefore has recommended using FENO to aid in the diagnosis and monitoring of eosinophilic airway inflammation and asthma, and for identifying steroid responsive individuals whose chronic respiratory symptoms may be caused by airway inflammation.

## 2022-12-09 NOTE — REASON FOR VISIT
[Follow-Up] : a follow-up visit [FreeTextEntry1] : abnormal PFTs, GERD, low testosterone, STEPHANIE on CPAP, overweight, poor sleep, RLS, COVID-19 11/2022 RADS and SOB

## 2022-12-19 ENCOUNTER — APPOINTMENT (OUTPATIENT)
Dept: AFTER HOURS CARE | Facility: EMERGENCY ROOM | Age: 27
End: 2022-12-19
Payer: COMMERCIAL

## 2022-12-20 ENCOUNTER — EMERGENCY (EMERGENCY)
Facility: HOSPITAL | Age: 27
LOS: 0 days | Discharge: HOME | End: 2022-12-20
Attending: STUDENT IN AN ORGANIZED HEALTH CARE EDUCATION/TRAINING PROGRAM

## 2022-12-20 VITALS
HEIGHT: 70 IN | OXYGEN SATURATION: 97 % | RESPIRATION RATE: 16 BRPM | DIASTOLIC BLOOD PRESSURE: 82 MMHG | HEART RATE: 108 BPM | SYSTOLIC BLOOD PRESSURE: 123 MMHG | TEMPERATURE: 98 F | WEIGHT: 274.92 LBS

## 2022-12-20 VITALS
RESPIRATION RATE: 18 BRPM | TEMPERATURE: 98 F | OXYGEN SATURATION: 98 % | SYSTOLIC BLOOD PRESSURE: 119 MMHG | DIASTOLIC BLOOD PRESSURE: 72 MMHG | HEART RATE: 91 BPM

## 2022-12-20 DIAGNOSIS — E03.9 HYPOTHYROIDISM, UNSPECIFIED: ICD-10-CM

## 2022-12-20 DIAGNOSIS — R51.9 HEADACHE, UNSPECIFIED: ICD-10-CM

## 2022-12-20 DIAGNOSIS — Z91.013 ALLERGY TO SEAFOOD: ICD-10-CM

## 2022-12-20 DIAGNOSIS — F41.9 ANXIETY DISORDER, UNSPECIFIED: ICD-10-CM

## 2022-12-20 DIAGNOSIS — S06.0X1A CONCUSSION WITH LOSS OF CONSCIOUSNESS OF 30 MINUTES OR LESS, INITIAL ENCOUNTER: ICD-10-CM
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
## 2022-12-20 DIAGNOSIS — M54.2 CERVICALGIA: ICD-10-CM

## 2022-12-20 DIAGNOSIS — F07.81 POSTCONCUSSIONAL SYNDROME: ICD-10-CM

## 2022-12-20 DIAGNOSIS — R20.2 PARESTHESIA OF SKIN: ICD-10-CM
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
## 2022-12-20 LAB
ALBUMIN SERPL ELPH-MCNC: 4.1 G/DL — SIGNIFICANT CHANGE UP (ref 3.3–5)
ALP SERPL-CCNC: 94 U/L — SIGNIFICANT CHANGE UP (ref 40–120)
ALT FLD-CCNC: 31 U/L — SIGNIFICANT CHANGE UP (ref 12–78)
ANION GAP SERPL CALC-SCNC: 3 MMOL/L — LOW (ref 5–17)
APTT BLD: 32.7 SEC — SIGNIFICANT CHANGE UP (ref 27.5–35.5)
AST SERPL-CCNC: 24 U/L — SIGNIFICANT CHANGE UP (ref 15–37)
BASOPHILS # BLD AUTO: 0.04 K/UL — SIGNIFICANT CHANGE UP (ref 0–0.2)
BASOPHILS NFR BLD AUTO: 0.3 % — SIGNIFICANT CHANGE UP (ref 0–2)
BILIRUB SERPL-MCNC: 0.9 MG/DL — SIGNIFICANT CHANGE UP (ref 0.2–1.2)
BUN SERPL-MCNC: 13 MG/DL — SIGNIFICANT CHANGE UP (ref 7–23)
CALCIUM SERPL-MCNC: 9.5 MG/DL — SIGNIFICANT CHANGE UP (ref 8.5–10.1)
CHLORIDE SERPL-SCNC: 103 MMOL/L — SIGNIFICANT CHANGE UP (ref 96–108)
CO2 SERPL-SCNC: 29 MMOL/L — SIGNIFICANT CHANGE UP (ref 22–31)
CREAT SERPL-MCNC: 0.95 MG/DL — SIGNIFICANT CHANGE UP (ref 0.5–1.3)
EGFR: 112 ML/MIN/1.73M2 — SIGNIFICANT CHANGE UP
EOSINOPHIL # BLD AUTO: 0.01 K/UL — SIGNIFICANT CHANGE UP (ref 0–0.5)
EOSINOPHIL NFR BLD AUTO: 0.1 % — SIGNIFICANT CHANGE UP (ref 0–6)
GLUCOSE SERPL-MCNC: 91 MG/DL — SIGNIFICANT CHANGE UP (ref 70–99)
HCT VFR BLD CALC: 51.8 % — HIGH (ref 39–50)
HGB BLD-MCNC: 16.7 G/DL — SIGNIFICANT CHANGE UP (ref 13–17)
IMM GRANULOCYTES NFR BLD AUTO: 0.3 % — SIGNIFICANT CHANGE UP (ref 0–0.9)
INR BLD: 1.11 RATIO — SIGNIFICANT CHANGE UP (ref 0.88–1.16)
LYMPHOCYTES # BLD AUTO: 19.4 % — SIGNIFICANT CHANGE UP (ref 13–44)
LYMPHOCYTES # BLD AUTO: 2.48 K/UL — SIGNIFICANT CHANGE UP (ref 1–3.3)
MCHC RBC-ENTMCNC: 26.7 PG — LOW (ref 27–34)
MCHC RBC-ENTMCNC: 32.2 G/DL — SIGNIFICANT CHANGE UP (ref 32–36)
MCV RBC AUTO: 82.9 FL — SIGNIFICANT CHANGE UP (ref 80–100)
MONOCYTES # BLD AUTO: 0.86 K/UL — SIGNIFICANT CHANGE UP (ref 0–0.9)
MONOCYTES NFR BLD AUTO: 6.7 % — SIGNIFICANT CHANGE UP (ref 2–14)
NEUTROPHILS # BLD AUTO: 9.38 K/UL — HIGH (ref 1.8–7.4)
NEUTROPHILS NFR BLD AUTO: 73.2 % — SIGNIFICANT CHANGE UP (ref 43–77)
NRBC # BLD: 0 /100 WBCS — SIGNIFICANT CHANGE UP (ref 0–0)
PLATELET # BLD AUTO: 317 K/UL — SIGNIFICANT CHANGE UP (ref 150–400)
POTASSIUM SERPL-MCNC: 4.2 MMOL/L — SIGNIFICANT CHANGE UP (ref 3.5–5.3)
POTASSIUM SERPL-SCNC: 4.2 MMOL/L — SIGNIFICANT CHANGE UP (ref 3.5–5.3)
PROT SERPL-MCNC: 7.9 GM/DL — SIGNIFICANT CHANGE UP (ref 6–8.3)
PROTHROM AB SERPL-ACNC: 13.3 SEC — SIGNIFICANT CHANGE UP (ref 10.5–13.4)
RBC # BLD: 6.25 M/UL — HIGH (ref 4.2–5.8)
RBC # FLD: 12.8 % — SIGNIFICANT CHANGE UP (ref 10.3–14.5)
SODIUM SERPL-SCNC: 135 MMOL/L — SIGNIFICANT CHANGE UP (ref 135–145)
WBC # BLD: 12.81 K/UL — HIGH (ref 3.8–10.5)
WBC # FLD AUTO: 12.81 K/UL — HIGH (ref 3.8–10.5)

## 2022-12-20 PROCEDURE — 99204 OFFICE O/P NEW MOD 45 MIN: CPT | Mod: NC,95

## 2022-12-20 PROCEDURE — 72125 CT NECK SPINE W/O DYE: CPT | Mod: 26,MA

## 2022-12-20 PROCEDURE — 99285 EMERGENCY DEPT VISIT HI MDM: CPT

## 2022-12-20 PROCEDURE — 70450 CT HEAD/BRAIN W/O DYE: CPT | Mod: 26,MA

## 2022-12-20 RX ORDER — METOCLOPRAMIDE HCL 10 MG
10 TABLET ORAL ONCE
Refills: 0 | Status: COMPLETED | OUTPATIENT
Start: 2022-12-20 | End: 2022-12-20

## 2022-12-20 RX ORDER — SODIUM CHLORIDE 9 MG/ML
1000 INJECTION INTRAMUSCULAR; INTRAVENOUS; SUBCUTANEOUS ONCE
Refills: 0 | Status: COMPLETED | OUTPATIENT
Start: 2022-12-20 | End: 2022-12-20

## 2022-12-20 RX ADMIN — SODIUM CHLORIDE 1000 MILLILITER(S): 9 INJECTION INTRAMUSCULAR; INTRAVENOUS; SUBCUTANEOUS at 03:17

## 2022-12-20 RX ADMIN — Medication 10 MILLIGRAM(S): at 03:12

## 2022-12-20 NOTE — ED PROVIDER NOTE - NSFOLLOWUPINSTRUCTIONS_ED_ALL_ED_FT
Concussion    WHAT YOU NEED TO KNOW:    A concussion is a mild brain injury. It is usually caused by a bump or blow to the head from a fall, a motor vehicle crash, or a sports injury. Sometimes being shaken forcefully may cause a concussion.    DISCHARGE INSTRUCTIONS:    Have someone call 911 for any of the following:   •You cannot be woken.  •You have a seizure, increasing confusion, or a change in personality.  •Your speech becomes slurred, or you have new vision problems.      Seek care immediately if:   •You have sudden and new vision problems.  •You have a severe headache that does not go away.  •You have arm or leg weakness, numbness, or new problems with coordination.  •You have blood or clear fluid coming out of the ears or nose.      Contact your healthcare provider if:   •You have nausea or are vomiting.  •You feel more sleepy than usual.  •Your symptoms get worse.  •Your symptoms last longer than 6 weeks after the injury.  •You have questions or concerns about your condition or care.      Medicines: You may need any of the following:   •Acetaminophen decreases pain and fever. It is available without a doctor's order. Ask how much to take and how often to take it. Follow directions. Read the labels of all other medicines you are using to see if they also contain acetaminophen, or ask your doctor or pharmacist. Acetaminophen can cause liver damage if not taken correctly. Do not use more than 4 grams (4,000 milligrams) total of acetaminophen in one day.   •NSAIDs help decrease swelling and pain or fever. This medicine is available with or without a doctor's order. NSAIDs can cause stomach bleeding or kidney problems in certain people. If you take blood thinner medicine, always ask your healthcare provider if NSAIDs are safe for you. Always read the medicine label and follow directions.  •Take your medicine as directed. Contact your healthcare provider if you think your medicine is not helping or if you have side effects. Tell him or her if you are allergic to any medicine. Keep a list of the medicines, vitamins, and herbs you take. Include the amounts, and when and why you take them. Bring the list or the pill bottles to follow-up visits. Carry your medicine list with you in case of an emergency.      Self-care: Concussion symptoms usually go away within about 10 days, but they may last longer. The following may be recommended to manage your symptoms:   •Rest from physical and mental activities as directed. Mental activities are those that require thinking, concentration, and attention. You will need to rest until your symptoms are gone. Rest will allow you to recover from your concussion. Ask your healthcare provider when you can return to work and other daily activities.  •Have someone stay with you for the first 24 hours after your injury. Your healthcare provider should be contacted if your symptoms get worse, or you develop new symptoms.  •Do not participate in sports and physical activities until your healthcare provider says it is okay. They could make your symptoms worse or lead to another concussion. Your healthcare provider will tell you when it is okay for you to return to sports or physical activities. Ask for more information about sports concussions.    Prevent another concussion:   •Wear protective sports equipment that fits properly. Helmets help decrease your risk for a serious brain injury. Talk to your healthcare provider about ways that can decrease your risk for a concussion if you play sports.  •Wear your seatbelt every time you travel. This helps to decrease your risk for a head injury if you are in a car accident.       Advance activity as tolerated.  Continue all previously prescribed medications as directed unless otherwise instructed.  Follow up with your primary care physician in 48-72 hours- bring copies of your results.  Return to the ER for worsening or persistent symptoms, and/or ANY NEW OR CONCERNING SYMPTOMS. If you have issues obtaining follow up, please call: [phone] to obtain a doctor or specialist who takes your insurance in your area.  You may call [phone] to make an appointment with the internal medicine clinic.

## 2022-12-20 NOTE — ED ADULT NURSE NOTE - CHIEF COMPLAINT QUOTE
pt states, playing dodgeball,  hit his head at approx 10:30pm.  pt c/o headache at this time.  pt's friend told him that he fell backwards and hit his head hard.  pt has no recall of incident.  pt spoke with [facility] who told him to go to ED.  pt states his hands feel different, states sensation feels like your hands are freezing cold and you put them in hot water.   denies N/V/blurry vision

## 2022-12-20 NOTE — ED PROVIDER NOTE - PATIENT PORTAL LINK FT
You can access the FollowMyHealth Patient Portal offered by [facility] by registering at the following website: [email or web address]. By joining [facility]’s FollowMyHealth portal, you will also be able to view your health information using other applications (apps) compatible with our system.

## 2022-12-20 NOTE — ED PROVIDER NOTE - CARE PROVIDER_API CALL
Manuel Ambriz)  Neurology; Neurophysiology  [address], [address]  [place] [address]  Phone: [phone]  Fax: [phone]  Follow Up Time:

## 2022-12-20 NOTE — ED ADULT NURSE NOTE - OBJECTIVE STATEMENT
covering primary RN Chadwick. Pt received in bed M22A, 28 y/o M, A&ox3, c/o head injury. pt states, pt was playing dodgeball,  hit his head at approx 10:30pm.  pt c/o headache at this time.  pt's friend told him that he fell backwards and hit his head hard.  pt has no recall of incident.  pt spoke with [facility] who told him to go to ED.  pt states his hands feel different, states sensation feels like your hands are freezing cold and you put them in hot water.  Pt denies dizziness, N/V/blurry vision, chest pain, sob. As per pt, PMH asthma, Tourette's. NKDA. No signs and symptoms of bleeding or bruising noted. Mom at bedside.

## 2022-12-20 NOTE — ASSESSMENT
[FreeTextEntry1] : Head trauma with brief loss of consciousness from low energy mechanism fall from standing height in otherwise healthy 27 y M not on AC.  Retrograde/Anterograde amnesia and HA make concussion most likely but new LUE paresthesias different from his prior cts symptoms are concerning for head/cspine injury, though much less likely given mechanism.  Inherent limitations of a true neurologic exam to assess motor/sensation in virtual visit make reassurance difficult.

## 2022-12-20 NOTE — PLAN
[FreeTextEntry1] : Plan to send patient to [facility] (pt lives nearby) for in person evaluation.  Though symptoms are most c/w uncomplicated concussion, his new paresthesias necessitate in-person exam +/- imaging to assess for intracranial hemorrhage or c-spine injury, though both unlikely.  I explained this at length to lakshmi.  Recommended EMS transport but he declined; mom is able to drive him.  I recommended he go to ED ASAP for evaluation.

## 2022-12-20 NOTE — HISTORY OF PRESENT ILLNESS
[Home] : at home, [unfilled] , at the time of the visit. [Other Location: e.g. Home (Enter Location, City,State)___] : at [unfilled] [Mother] : mother [Verbal consent obtained from patient] : the patient, [unfilled] [FreeTextEntry8] : 27 y GERD, low testosterone, STEPHANIE on CPAP, overweight c head trauma approx 3 hours ago.  States was playing dodgeball, fell, struck head and does not remember event.  Has some retrograde amnesia and anterograde amnesia -- memory is intermittent and does not remember much before being driven home.  States mechanism was slipping on a turf field while playing dodgeball.  Witnessed, no reported sz-like activity, no reported loc.  No tongue biting or bowel/bladder incontinence.  Now reports only slight HA.  No NV, no vision changes, no neck pain.  States has new L hand paresthesias/altered sensation after trauma and is uncertain if this is attributable to injury; states change in sensation is not c/w his established carpal tunnel symptoms, specifies that he feels different in the L hand/?LUE?.  Not on AC.\par Believes he has had 1 concussion in the past

## 2022-12-20 NOTE — ED PROVIDER NOTE - CLINICAL SUMMARY MEDICAL DECISION MAKING FREE TEXT BOX
Pt with no hx of anticoagulation p/w  head injury s/p mechanical fall. ( yes) LOC. Normal appearing without any signs or symptoms of serious injury on secondary trauma survey. Low suspicion for intracranial hemorrhage or other intracranial traumatic injury. No periorbital or posterior periauricular ecchymosis to suggest skull fracture. No abdominal ecchymosis to indicate concern for serious trauma to the thorax or abdomen. Pelvis without evidence of injury and patient is neurologically intact. Stable gait and tolerating PO. Otherwise no other evidence of deformity or joint instability. Labs unremarkable. Improved. Likely concussion.  - CT HEAD and NECK  - labs, IVF, reglan for headache.  - neurologically intact  - Pain control PRN, Anti-emetics PRN  - Re-eval for disposition.

## 2022-12-20 NOTE — ED PROVIDER NOTE - PHYSICAL EXAMINATION
VITAL SIGNS: I have reviewed nursing notes and confirm.   GEN: Well-developed; well-nourished; in no acute distress. Speaking full sentences. GCS 15  SKIN: Warm, pink, no rash, no diaphoresis, no cyanosis, well perfused.   HEAD: Normocephalic; atraumatic. No scalp lacerations, no abrasions.  NECK: Supple; non tender.  NO midline ttp,  NO step offs,  Full ROM w/o pain.  EYES: Pupils 3mm equal, round, reactive to light and accomodation, conjunctiva and sclera clear. Extra-ocular movements intact bilaterally.  ENT: No nasal discharge; airway clear. Trachea is midline. ORAL: No oropharyngeal exudates or erythema. Normal dentition.  CV: Regular rate and rhythm. S1, S2 normal; no murmurs, gallops, or rubs. No lower extremity pitting edema bilaterally. Capillary refill < 2 seconds throughout. Distal pulses intact 2+ throughout.  NO chest wall TTP.  RESP: CTA bilaterally. No wheezes, rales, or rhonchi.   ABD: Normal bowel sounds, soft, non-distended, non-tender, no rebound, no guarding, no rigidity, no hepatosplenomegaly. No CVA tenderness bilaterally.   MSK: Normal range of motion and movement of all 4 extremities. No joint or muscular pain throughout. No clubbing.    BACK:  NO thoracolumbar midline TTP,  NO parathoracic TTP,  NO paralumbar TTP. No step-offs or obvious deformities.   NEURO: Alert & oriented x 3, Grossly unremarkable. Sensory and motor intact throughout. No focal deficits. Gait: Fluid. Normal speech and coordination.   PSYCH: Cooperative, appropriate.

## 2022-12-20 NOTE — ED PROVIDER NOTE - OBJECTIVE STATEMENT
27M PMHx of anxiety, tourettes, hypothyroidism, b/l carpal tunnel, with no anticoagulation presenting with head injury/headache s/p mechanical fall at about 10 PM last night while playing dodgeball. Fell while walking backwards, hitting back of head. (+) LOC. Does not remember events. Mild diffuse headache, mild right sided neck pain. Otherwise: denies any chest pain, abdominal pain, shortness of breath, nausea/vomiting,  , back pain, hip pain, other extremity injury, LOC, syncope, lightheadedness, anticoagulation use, headaches, visual complaints, rashes, fevers/chills, difficulty ambulating, abrasions, lacerations, subjective neurological deficits.

## 2022-12-20 NOTE — ED ADULT TRIAGE NOTE - CHIEF COMPLAINT QUOTE
pt states, playing dodgeball,  hit his head at approx 10:30pm.  pt c/o headache at this time.  pt's friend told him that he fell backwards and hit his head hard.  pt has no recall of incident.  pt spoke with [facility] who told him to go to ED.  pt states his hands feel different, states feels like your hands are freezing cold and you put them in hot water.   denies N/V/blurry vision pt states, playing dodgeball,  hit his head at approx 10:30pm.  pt c/o headache at this time.  pt's friend told him that he fell backwards and hit his head hard.  pt has no recall of incident.  pt spoke with [facility] who told him to go to ED.  pt states his hands feel different, states sensation feels like your hands are freezing cold and you put them in hot water.   denies N/V/blurry vision

## 2023-01-04 ENCOUNTER — RX RENEWAL (OUTPATIENT)
Age: 28
End: 2023-01-04

## 2023-01-04 RX ORDER — FLUTICASONE PROPIONATE AND SALMETEROL 250; 50 UG/1; UG/1
250-50 POWDER RESPIRATORY (INHALATION) TWICE DAILY
Qty: 60 | Refills: 0 | Status: ACTIVE | COMMUNITY
Start: 2022-12-08 | End: 1900-01-01

## 2023-01-11 ENCOUNTER — APPOINTMENT (OUTPATIENT)
Age: 28
End: 2023-01-11

## 2023-01-15 ENCOUNTER — NON-APPOINTMENT (OUTPATIENT)
Age: 28
End: 2023-01-15

## 2023-01-16 ENCOUNTER — APPOINTMENT (OUTPATIENT)
Dept: BARIATRICS | Facility: CLINIC | Age: 28
End: 2023-01-16
Payer: COMMERCIAL

## 2023-01-16 VITALS — HEIGHT: 70 IN | BODY MASS INDEX: 38.8 KG/M2 | WEIGHT: 271 LBS

## 2023-01-16 DIAGNOSIS — E66.3 OVERWEIGHT: ICD-10-CM

## 2023-01-16 DIAGNOSIS — R73.01 IMPAIRED FASTING GLUCOSE: ICD-10-CM

## 2023-01-16 DIAGNOSIS — E23.2 DIABETES INSIPIDUS: ICD-10-CM

## 2023-01-16 PROCEDURE — G0447 BEHAVIOR COUNSEL OBESITY 15M: CPT | Mod: 59,95

## 2023-01-16 PROCEDURE — 99213 OFFICE O/P EST LOW 20 MIN: CPT | Mod: 25,95

## 2023-01-16 NOTE — REASON FOR VISIT
[Home] : at home, [unfilled] , at the time of the visit. [Medical Office: ([facility])___] : at the medical office located in  [Patient] : the patient [Self] : self [Follow-Up] : a follow-up visit

## 2023-01-17 NOTE — HISTORY OF PRESENT ILLNESS
[FreeTextEntry1] : Patient is a 28 y/o man with a BMI of 46.93 and the following obesity related comorbidities: GERD, STEPHANIE. He has been struggling with weight for years and has failed multiple attempts at non-surgical weight loss options. He has tried the following diets and weight loss options: Nutritionist, Medically Supervised Weight Loss, Hypnosis/Acupuncture, Weight Watchers, Medications. He is currently in the process of preparing for bariatric surgery.\par \par Interval History: He is motivated to lose weight. He was taking Wegovy but had some loose BMs and it was stopped. He was supposed to start Plenity, but was unable to get it from the pharmacy. Presently taking Mounjaro 2.5 mg weekly and feels well. Continues to follow a healthier diet. He is exercising several times a week. Since his last visit, he has lost another 7 lbs. Patient is down overall about 60 lbs! He no longer is interested in Bariatric surgery. Will continue to follow her for medical weight management. \par \par \par

## 2023-01-17 NOTE — ASSESSMENT
[FreeTextEntry1] : 27 year old male with Class 3 Obesity. Celebrated patient's weight loss. He is motivated to lose more weight. Will continue to focus on following a healthy lifestyle. At least 15 minutes was spent during the visit on obesity counseling.  He is requesting to do a trial of Mounjaro 5 mg. Risks and benefits of therapy with Mounjaro were discussed. \par \par f/u with RD in 1 month and myself in 2 months

## 2023-01-17 NOTE — PHYSICAL EXAM
[FreeTextEntry1] : General: Awake, alert, non- diaphoretic. In no acute distress. Well nourished.\par Head: Normocephalic \par Skin: No obvious bruises or rashes on hands and face. There are no typical cushingoid features.\par Eyes: No swelling, lid lag or orbitopathy. No conjunctival injection\par Thyroid: No obvious goiter\par Respiratory: no increased respiratory effort. Able to speak in clear sentences.\par Neurological: Speech normal rate. \par Psychiatric: normal affect. Cooperative\par Musculoskeletal: no kyphosis. UE- free range of motion\par Extremities: No tremor. No edema (patient palpated for pitting)\par

## 2023-01-24 ENCOUNTER — TRANSCRIPTION ENCOUNTER (OUTPATIENT)
Age: 28
End: 2023-01-24

## 2023-02-01 ENCOUNTER — RX RENEWAL (OUTPATIENT)
Age: 28
End: 2023-02-01

## 2023-02-13 ENCOUNTER — APPOINTMENT (OUTPATIENT)
Dept: ENDOCRINOLOGY | Facility: CLINIC | Age: 28
End: 2023-02-13

## 2023-02-16 ENCOUNTER — FORM ENCOUNTER (OUTPATIENT)
Age: 28
End: 2023-02-16

## 2023-02-16 ENCOUNTER — APPOINTMENT (OUTPATIENT)
Dept: ORTHOPEDIC SURGERY | Facility: CLINIC | Age: 28
End: 2023-02-16
Payer: COMMERCIAL

## 2023-02-16 VITALS — WEIGHT: 265 LBS | HEIGHT: 70 IN | BODY MASS INDEX: 37.94 KG/M2

## 2023-02-16 DIAGNOSIS — S76.112A STRAIN OF LEFT QUADRICEPS MUSCLE, FASCIA AND TENDON, INITIAL ENCOUNTER: ICD-10-CM

## 2023-02-16 DIAGNOSIS — M79.18 MYALGIA, OTHER SITE: ICD-10-CM

## 2023-02-16 PROCEDURE — 73564 X-RAY EXAM KNEE 4 OR MORE: CPT | Mod: LT

## 2023-02-16 PROCEDURE — 99214 OFFICE O/P EST MOD 30 MIN: CPT

## 2023-02-16 PROCEDURE — L1832: CPT | Mod: LT

## 2023-02-16 NOTE — IMAGING
[de-identified] :  LEFT KNEE\par Inspection:  mod effusion\par Palpation: medial joint line tenderness, anterior tenderness, gap and ttp over quad tendon insertion\par Knee Range of Motion:  3-100\par Strength: 3/5 Quadriceps strength, 5/5 Hamstring strength\par Neurological: light touch is intact throughout\par Ligament Stability and Special Tests: \par able to SLR but with lag\par \par \par

## 2023-02-16 NOTE — HISTORY OF PRESENT ILLNESS
[de-identified] : 28 year old male  ([facility] leg budget analysi, kickball, dodgeball)  left knee pain since 2/12/23 was running to base in kicklball and felt something pull and pop\par The pain is located  anterior, lateral and by his quad tendon\par The pain is associated with  swelling and buckling\par Worse with activity and better at rest.\par Has tried ice, ibuprofen, massage gun  \par

## 2023-02-16 NOTE — ASSESSMENT
[FreeTextEntry1] : Left X-Ray Examination of the KNEE (4 views): there are no fractures, subluxations or dislocations. deg changes\par \par Due to the patients injury along with defect and pain over the quad tendon and trouble with straight leg raise,  we will get an mri to eval for quadriceps tendon tear\par \par - The patient was advised of the diagnosis.  The natural history of the pathology was explained to the patient in layman's terms.  Several different treatment options were discussed and explained including the risks and benefits of both surgical and non-surgical treatments.\par - The patient was advised to apply ice (wrapped in a towel or protective covering) to the area daily (20 minutes at a time, 2-4X/day).\par - The patient was advised to modify their activities.\par - napryn rx\par - Patient was given a prescription for an anti-inflammatory medication.  They will take it for the next week and then on an as needed basis, as long as there are no medical contra-indications.  Patient is counseled on possible GI, renal, and cardiovascular side effects.\par - knee brace locked in extension\par - f/u after mri\par

## 2023-02-17 ENCOUNTER — APPOINTMENT (OUTPATIENT)
Dept: MRI IMAGING | Facility: CLINIC | Age: 28
End: 2023-02-17
Payer: COMMERCIAL

## 2023-02-17 PROCEDURE — 73721 MRI JNT OF LWR EXTRE W/O DYE: CPT | Mod: LT

## 2023-02-19 ENCOUNTER — RX RENEWAL (OUTPATIENT)
Age: 28
End: 2023-02-19

## 2023-02-20 ENCOUNTER — APPOINTMENT (OUTPATIENT)
Dept: ORTHOPEDIC SURGERY | Facility: CLINIC | Age: 28
End: 2023-02-20
Payer: COMMERCIAL

## 2023-02-20 DIAGNOSIS — M25.462 EFFUSION, LEFT KNEE: ICD-10-CM

## 2023-02-20 DIAGNOSIS — M76.892 OTHER SPECIFIED ENTHESOPATHIES OF LEFT LOWER LIMB, EXCLUDING FOOT: ICD-10-CM

## 2023-02-20 PROCEDURE — 99214 OFFICE O/P EST MOD 30 MIN: CPT

## 2023-02-20 NOTE — ASSESSMENT
[FreeTextEntry1] : mri left knee 2/17/23 - quad muscle strain and quad tendinitis, eff, chondral defect LFC 5mm, contusion LFC with edema\par \par \par - The patient was advised of the diagnosis.  The natural history of the pathology was explained to the patient in layman's terms.  Several different treatment options were discussed and explained including the risks and benefits of both surgical and non-surgical treatments.  All questions and concerns were answered.\par - We will continue conservative treatment with PT, icing, and anti-inflammatory medications.\par - The patient was provided with a prescription for Physical Therapy.\par - fu 6 week if cont symptoms we discussed surgery for L chondro, poss microfx, poss biopsy\par

## 2023-02-20 NOTE — HISTORY OF PRESENT ILLNESS
[de-identified] : 28 year old male  ([facility] leg budget analysis, kickball, dodgeball)  left knee pain since 2/12/23 was running to base in kicklball and felt something pull and pop\par The pain is located  anterior, lateral and by his quad tendon\par The pain is associated with  swelling and buckling\par Worse with activity and better at rest.\par Has tried ice, ibuprofen, massage gun  \par \par 2/20/23 - used brace, mod activity, had mri

## 2023-02-20 NOTE — IMAGING
[de-identified] : \par LEFT KNEE\par Inspection:  mod effusion\par Palpation: lateral femoral condyle tenderness and quad tendon ttp\par Knee Range of Motion:  0-130 \par Strength: 5/5 Quadriceps strength, 5/5 Hamstring strength\par Neurological: light touch is intact throughout\par Ligament Stability and Special Tests: \par McMurrays: neg\par Lachman: neg\par Pivot Shift: neg\par Posterior Drawer: neg\par Valgus: neg\par Varus: neg\par Patella Apprehension: neg\par Patella Maltracking: neg\par \par \par \par \par

## 2023-02-27 ENCOUNTER — RX RENEWAL (OUTPATIENT)
Age: 28
End: 2023-02-27

## 2023-03-02 ENCOUNTER — TRANSCRIPTION ENCOUNTER (OUTPATIENT)
Age: 28
End: 2023-03-02

## 2023-03-03 ENCOUNTER — TRANSCRIPTION ENCOUNTER (OUTPATIENT)
Age: 28
End: 2023-03-03

## 2023-04-05 ENCOUNTER — APPOINTMENT (OUTPATIENT)
Age: 28
End: 2023-04-05

## 2023-04-05 ENCOUNTER — APPOINTMENT (OUTPATIENT)
Dept: BARIATRICS/WEIGHT MGMT | Facility: CLINIC | Age: 28
End: 2023-04-05
Payer: COMMERCIAL

## 2023-04-05 VITALS
SYSTOLIC BLOOD PRESSURE: 118 MMHG | DIASTOLIC BLOOD PRESSURE: 74 MMHG | HEIGHT: 70 IN | HEART RATE: 101 BPM | OXYGEN SATURATION: 97 % | TEMPERATURE: 97.7 F | WEIGHT: 251 LBS | BODY MASS INDEX: 35.93 KG/M2

## 2023-04-05 DIAGNOSIS — R53.83 OTHER FATIGUE: ICD-10-CM

## 2023-04-05 PROCEDURE — 99214 OFFICE O/P EST MOD 30 MIN: CPT

## 2023-04-05 PROCEDURE — G0447 BEHAVIOR COUNSEL OBESITY 15M: CPT | Mod: 59

## 2023-04-09 PROBLEM — R53.83 FATIGUE: Status: ACTIVE | Noted: 2022-04-13

## 2023-04-09 NOTE — ASSESSMENT
[FreeTextEntry1] : 27 year old male with Class 3 Obesity. Celebrated patient's weight loss. He is motivated to lose more weight. Will continue to focus on following a healthy lifestyle. He met with the RD today to discuss additional changes he can implement. Must continue to exercise including cardio and strength training. At least 15 minutes was spent during the visit on obesity counseling.  \par \par Continue Mounjaro 5 mg weekly\par \par Patient to complete labs, will call with the results. \par \par f/u in 3 months

## 2023-04-09 NOTE — HISTORY OF PRESENT ILLNESS
[FreeTextEntry1] : Patient is a 28 y/o man with a BMI of 46.93 and the following obesity related comorbidities: GERD, STEPHANIE. He has been struggling with weight for years and has failed multiple attempts at non-surgical weight loss options. He has tried the following diets and weight loss options: Nutritionist, Medically Supervised Weight Loss, Hypnosis/Acupuncture, Weight Watchers, Medications. He is currently in the process of preparing for bariatric surgery.\par \par Interval History: Since his last visit, he is down 27 lbs. He endorses making significant changes to his diet and becoming active in sports regularly. He continues to take Wegovy 5 mg weekly. Patient is down overall about 75 lbs! He no longer is interested in Bariatric surgery. Will continue to follow her for medical weight management. \par \par \par

## 2023-04-20 ENCOUNTER — APPOINTMENT (OUTPATIENT)
Dept: PULMONOLOGY | Facility: CLINIC | Age: 28
End: 2023-04-20
Payer: COMMERCIAL

## 2023-04-20 VITALS
BODY MASS INDEX: 35.07 KG/M2 | SYSTOLIC BLOOD PRESSURE: 118 MMHG | TEMPERATURE: 97.8 F | HEIGHT: 70 IN | RESPIRATION RATE: 16 BRPM | DIASTOLIC BLOOD PRESSURE: 72 MMHG | OXYGEN SATURATION: 98 % | HEART RATE: 88 BPM | WEIGHT: 245 LBS

## 2023-04-20 DIAGNOSIS — G25.81 RESTLESS LEGS SYNDROME: ICD-10-CM

## 2023-04-20 DIAGNOSIS — Z87.898 PERSONAL HISTORY OF OTHER SPECIFIED CONDITIONS: ICD-10-CM

## 2023-04-20 PROCEDURE — 99214 OFFICE O/P EST MOD 30 MIN: CPT | Mod: 25

## 2023-04-20 PROCEDURE — ZZZZZ: CPT

## 2023-04-20 PROCEDURE — 94010 BREATHING CAPACITY TEST: CPT

## 2023-04-20 PROCEDURE — 94729 DIFFUSING CAPACITY: CPT

## 2023-04-20 PROCEDURE — 95012 NITRIC OXIDE EXP GAS DETER: CPT

## 2023-04-20 PROCEDURE — 94727 GAS DIL/WSHOT DETER LNG VOL: CPT

## 2023-04-20 NOTE — ASSESSMENT
[FreeTextEntry1] : Mr. Mathur is a 28 year old male with a history of SOB, GERD, poor sleep, Tourette's syndrome, asthma, allergy, ADD, sleep apnea, ?RLS, insomnia, obesity, OCD, and anxiety presenting to the office for a follow up pulmonary re-evaluation - +food allergies. Dx of hypogonadism, hypothyroidism, pituitary tumor, allergies- improved - s/p covid asthma 11/2022 - #1 issue is fatigue despite weight loss/CPAP use\par  \par \par His shortness of breath is multifactorial due to:\par -obesity/out of shape\par -poor breathing mechanics\par Less likely\par -?CAD\par -?asthma\par \par problem 1: STEPHANIE- stable \par -Settings: Dreamstation APAP CPAP mode of 10 cm H2O with FFM ([facility])\par -s/p off Modafinil 200 mg QAM due to "crashing"  \par -add Nuvigil 250 mg QAM (ISTOP)\par -Sleep apnea is associated with adverse clinical consequences which an affect most organ systems.  Cardiovascular disease risk includes arrhythmias, atrial fibrillation, hypertension, coronary artery disease, and stroke. Metabolic disorders include diabetes type 2, non-alcoholic fatty liver disease. Mood disorder especially depression; and cognitive decline especially in the elderly. Associations with  chronic reflux/Leahy’s esophagus some but not all inclusive. \par -Reasons  include arousal consistent with hypopnea; respiratory events most prominent in REM sleep or supine position; therefore sleep staging and body position are important for accurate diagnosis and estimation of AHI. \par \par Problem 1A: Suspected COVID-19 infection (not found)\par -s/p COVID-19 vaccine x3 \par -s/p Alkaseltzer cold and flu \par -s/p quarantine for 10 days \par COVID-19 precautionary Immune Support Recommendations:\par -OTC Vitamin C 500mg BID \par -OTC Quercetin 250-500mg BID \par -OTC Zinc 75-100mg per day \par -OTC Melatonin 1 or 2mg a night \par -OTC Vitamin D 1-4000mg per day \par -OTC Tonic Water 8oz per day\par -Water 0.5-1 gallon per day\par Asthma and COVID19:\par You need to make sure your asthma is under control. This often requires the use of inhaled corticosteroids (and sometimes oral corticosteroids). Inhaled corticosteroids do not likely reduce your immune system’s ability to fight infections, but oral corticosteroids may. It is important to use the steps above to protect yourself to limit your exposure to any respiratory virus. \par \par Problem 1B: s/p Pre-Op Clearance for Bariatric Surgery - (aborted)\par -at this point in time there are no absolute pulmonary contraindications to go forward with the planned procedure \par -at the time of surgery s/he should have optimal pain control, incentive spirometry, early ambulation, DVT and GI prophylaxis. \par \par problem 2: insomnia/poor sleep (improved)\par -Recommended to use Insomnitol (take 1st) / Requip .5 mg\par -recommended to use Sleep Guard (2nd, if he wakes up)\par -Recommended Perrigo OTC \par -recommended to use sunglasses 30 minutes before bedtime and to try room darkening window shades\par -Good sleep hygiene was encouraged including avoiding watching television an hour before bed, keeping caffeine at a low,  avoiding reading, television, or anything, in bed, no drinking any liquids three hours before bedtime, and only getting into bed when tired and ready for sleep. \par \par problem 3: RLS\par - He is s/p blood work, which revealed: ferritin level (normal), iron binding level (normal), testosterone level (low), TFT (normal) and vitamin D level (normal)\par - continue  Mirapex 0.5mg QHS\par -Restless Legs Syndrome (RLS), also known as Stewart-Ekbom Disease, is a common sleep -related movement disorder. About 1 in 10 adults in the [place] have problems from restless leg syndrome. It also can be seen in about 2% of children. Women are twice as likely as men to have RLS. People with RLS will have symptoms  most often during times when they are less active, especially at bedtime. RLS most often causes an overwhelming urge to move your legs and sometimes other parts of your body. This urge is associated with unpleasant sensations in different parts of th body. The symptoms can be mild to severe and can affect your ability to go to sleep and stay asleep. People with RLS often sleep less at night and feel more tired during the day. \par \par problem 4: abnormal PFTs-  c/w Asthma \par -MCT c/w asthma\par -confirms sleep apnea - inspiratory limb flattened\par -continue Breo 200 1 inhalation QD or Symbicort 160 2 inhalations BID \par -continue Ventolin 2 puffs Q6H \par -continue Singulair 10 mg QHS \par \par problem 5: allergy sinus \par -continue Olopatadine 0.6% 1 sniff BID \par - add Clarinex 5 mg QAM\par Environmental measures for allergies were encouraged including mattress and pillow cover, air purifier, and environmental controls \par \par problem 6: residual fatigue\par -s/p Modafinil 200 mg QAM (5AM) - I-STOP reviewed due to "crashing" \par -add Nuvigil 250 mg QAM (ISTOP)\par -add Boron 6 mg QD \par -endocrinology results f/u \par \par Problem 7: GERD\par - Continue Prilosec 20 mg QAM before breakfast\par -continue Pepcid 40mg QHS \par - [place] Feldman - c/w mild GERD/HH \par \par Things to avoid including overeating, spicy foods, tight clothing, eating within three hours of bed, this list is not all inclusive. \par -Rule of 2s: avoid eating too much, eating too late, eating too spicy, eating two hours before bed\par -For treatment of reflux, possible options discussed including diet control, H2 blockers, PPIs, as well as coating motility agents discussed as treatment options. Timing of meals and proximity of last meal to sleep were discussed. If symptoms persist, a formal gastrointestinal evaluation is needed. \par \par problem 8: low testosterone (off Rx)\par -recommended supplemental Boron 6mg QD - restart 4/2023\par -Off testosterone Rx (Abelev)\par -Referred to endocrinologist for further evaluation (Abelev)\par - Referred to urologist (Dave Hoenig)\par \par problem 9: poor breathing mechanics\par -Recommended Wim Hof and Buteyko breathing techniques \par -Proper breathing techniques were reviewed with an emphasis of exhalation. Patient instructed to breath in for 1 second and out for four seconds. Patient was encouraged to not talk while walking. \par \par problem 10: obesity/out of shape\par -on Mounjaro \par - Mason Quinones 10 days detox \par - recommended to see Dr. Luca Singh\par -recommended to increase protein and decrease carbohydrates\par -recommended to reduce caffeine and increase water intake\par -Weight loss, exercise, and diet control were discussed and are highly encouraged. Treatment options were given such as, aqua therapy, and contacting a nutritionist. Recommended to use the elliptical, stationary bike, refrain from use of treadmill. Mindful eating was explained to the patient. Obesity is associated with worsening asthma, shortness of breath, and potential for cardiac disease, diabetes, and other underlying medical conditions.\par \par Problem 11a: COVID-19 precautionary Immune Support Recommendations:\par -s/p COVID-19 11/2022\par -OTC Vitamin C 500mg BID \par -OTC Quercetin 250-500mg BID \par -OTC Zinc 75-100mg per day \par -OTC Melatonin 1 or 2mg a night \par -OTC Vitamin D 1-4000mg per day \par -OTC Tonic Water 8oz per day\par -Water 0.5-1 gallon per day\par \par problem 11: health maintenance- [facility] \par -Nocturnal sweats - Recommend pharmacy consult regarding his medication\par -s/p Covid-19 vaccine Pfizer x3\par -s/p Influenza vaccine 09/2020. \par -recommended strep pneumonia vaccines: Prevnar-13 vaccine, followed by Pneumo vaccine 23 one year following\par -recommended early intervention for URIs\par -recommended regular osteoporosis evaluations\par -recommended early dermatological evaluations\par -recommended after the age of 50 to the age of 70, colonoscopy every 5 years \par  \par \par Follow up in 4 months \par Patient is encouraged to call with any changes, concerns or questions.

## 2023-04-20 NOTE — ADDENDUM
[FreeTextEntry1] : Documented by Sg Diallo acting as a scribe for Dr. Brant Marin on 04/20/2023.\par \par All medical record entries made by the Scribe were at my, Dr. Brant Marin's, direction and personally dictated by me on 04/20/2023. I have reviewed the chart and agree that the record accurately reflects my personal performance of the history, physical exam, assessment and plan. I have also personally directed, reviewed, and agree with the discharge instructions.

## 2023-04-20 NOTE — PROCEDURE
[FreeTextEntry1] : Full PFT revealed normal flows, with a FEV1 of 4.66L, which is 104% of predicted, normal lung volumes, and a diffusion of 46.7, which is 133% of predicted, with a normal flow volume loop. -PFTs performed today for evaluation of asthma and SOB(04/20/2023) \par \par Feno was 37; a normal value being less than 25. Fractional exhaled nitric oxide (FENO) is regarded as a simple, noninvasive method for assessing eosinophilic airway inflammation. Produced by a variety of cells within the lung, nitric oxide (NO) concentrations are generally low in healthy individuals. However, high concentrations of NO appear to be involved in nonspecific host defense mechanisms and chronic inflammatory  diseases such as asthma. The American Thoracic Society (ATS) therefore recommended using FENO to aid in the diagnosis and monitoring of eosinophilic airway inflammation and asthma, and for identifying steroid responsive individuals whose chronic respiratory symptoms may be caused by airway inflammation

## 2023-04-20 NOTE — HISTORY OF PRESENT ILLNESS
[FreeTextEntry1] : Mr. Mathur is a 28 year old male with a history of abnormal PFTs, GERD, low testosterone, STEPHANIE on CPAP, overweight, poor sleep, RLS, and SOB presenting to the office today for a follow up visit. His chief complaint is\par \par -he notes losing 82 lbs in total\par -he notes fatigue at times\par -he notes feeling generally well\par -he notes constipation recently \par -he notes he is on Mounjaro, which he notes helps get rid of cravings\par -he notes exercising \par -he notes drinking more than a gallon of water per day\par -he notes that he is sleeping well \par -he notes regular CPAP use but he once forgot his CPAP when traveling but still slept quite well\par -he notes his libido is not great\par \par \par -patient denies any headaches, nausea, vomiting, fever, chills, sweats, chest pain, chest pressure, palpitations, coughing, wheezing, fatigue, diarrhea, dysphagia, myalgias, dizziness, leg swelling, leg pain, itchy eyes, itchy ears, heartburn, reflux or sour taste in the mouth  Full range of motion of upper and lower extremities, no joint tenderness/swelling.

## 2023-04-20 NOTE — PHYSICAL EXAM
[No Acute Distress] : no acute distress [Normal Oropharynx] : normal oropharynx [III] : Mallampati Class: III [Normal Appearance] : normal appearance [Normal Rate/Rhythm] : normal rate/rhythm [No Neck Mass] : no neck mass [Normal S1, S2] : normal s1, s2 [No Murmurs] : no murmurs [No Resp Distress] : no resp distress [Clear to Auscultation Bilaterally] : clear to auscultation bilaterally [Benign] : benign [No Abnormalities] : no abnormalities [Normal Gait] : normal gait [No Clubbing] : no clubbing [No Cyanosis] : no cyanosis [FROM] : FROM [No Edema] : no edema [Normal Color/ Pigmentation] : normal color/ pigmentation [No Focal Deficits] : no focal deficits [Oriented x3] : oriented x3 [Normal Affect] : normal affect [TextBox_68] : I:E ratio 1:3; clear

## 2023-04-20 NOTE — REASON FOR VISIT
[Follow-Up] : a follow-up visit [FreeTextEntry1] : abnormal PFTs, GERD, low testosterone, STEPHANIE on CPAP, overweight, poor sleep, RLS, COVID-19 11/2022 asthma and SOB

## 2023-04-27 LAB
ALBUMIN SERPL ELPH-MCNC: 4.6 G/DL
ALP BLD-CCNC: 127 U/L
ALT SERPL-CCNC: 20 U/L
ANION GAP SERPL CALC-SCNC: 12 MMOL/L
AST SERPL-CCNC: 28 U/L
BILIRUB SERPL-MCNC: 0.8 MG/DL
BUN SERPL-MCNC: 12 MG/DL
CALCIUM SERPL-MCNC: 9.9 MG/DL
CHLORIDE SERPL-SCNC: 103 MMOL/L
CHOLEST SERPL-MCNC: 110 MG/DL
CO2 SERPL-SCNC: 26 MMOL/L
CREAT SERPL-MCNC: 0.83 MG/DL
EGFR: 122 ML/MIN/1.73M2
ESTIMATED AVERAGE GLUCOSE: 100 MG/DL
GLUCOSE SERPL-MCNC: 81 MG/DL
HBA1C MFR BLD HPLC: 5.1 %
HCT VFR BLD CALC: 50 %
HDLC SERPL-MCNC: 40 MG/DL
HGB BLD-MCNC: 15.3 G/DL
LDLC SERPL CALC-MCNC: 60 MG/DL
MCHC RBC-ENTMCNC: 27.6 PG
MCHC RBC-ENTMCNC: 30.6 GM/DL
MCV RBC AUTO: 90.1 FL
NONHDLC SERPL-MCNC: 71 MG/DL
PLATELET # BLD AUTO: 241 K/UL
POTASSIUM SERPL-SCNC: 4.7 MMOL/L
PROT SERPL-MCNC: 7 G/DL
RBC # BLD: 5.55 M/UL
RBC # FLD: 13.2 %
SODIUM SERPL-SCNC: 140 MMOL/L
T4 FREE SERPL-MCNC: 1.3 NG/DL
T4 SERPL-MCNC: 6.3 UG/DL
TRIGL SERPL-MCNC: 54 MG/DL
TSH SERPL-ACNC: 1 UIU/ML
WBC # FLD AUTO: 6.12 K/UL

## 2023-04-28 ENCOUNTER — TRANSCRIPTION ENCOUNTER (OUTPATIENT)
Age: 28
End: 2023-04-28

## 2023-05-11 ENCOUNTER — TRANSCRIPTION ENCOUNTER (OUTPATIENT)
Age: 28
End: 2023-05-11

## 2023-05-31 ENCOUNTER — INPATIENT (INPATIENT)
Facility: HOSPITAL | Age: 28
LOS: 1 days | Discharge: HOME | DRG: 494 | End: 2023-06-02
Attending: ORTHOPAEDIC SURGERY | Admitting: ORTHOPAEDIC SURGERY
Payer: COMMERCIAL

## 2023-05-31 VITALS
HEIGHT: 70 IN | OXYGEN SATURATION: 97 % | RESPIRATION RATE: 25 BRPM | SYSTOLIC BLOOD PRESSURE: 133 MMHG | HEART RATE: 66 BPM | WEIGHT: 225.09 LBS | DIASTOLIC BLOOD PRESSURE: 73 MMHG

## 2023-05-31 LAB
ABO RH CONFIRMATION: SIGNIFICANT CHANGE UP
ANION GAP SERPL CALC-SCNC: 13 MMOL/L — SIGNIFICANT CHANGE UP (ref 5–17)
APTT BLD: 25.9 SEC — LOW (ref 27.5–35.5)
BASOPHILS # BLD AUTO: 0.02 K/UL — SIGNIFICANT CHANGE UP (ref 0–0.2)
BASOPHILS NFR BLD AUTO: 0.2 % — SIGNIFICANT CHANGE UP (ref 0–2)
BLD GP AB SCN SERPL QL: SIGNIFICANT CHANGE UP
BUN SERPL-MCNC: 19.3 MG/DL — SIGNIFICANT CHANGE UP (ref 8–20)
CALCIUM SERPL-MCNC: 9 MG/DL — SIGNIFICANT CHANGE UP (ref 8.4–10.5)
CHLORIDE SERPL-SCNC: 102 MMOL/L — SIGNIFICANT CHANGE UP (ref 96–108)
CK MB CFR SERPL CALC: 5.7 NG/ML — SIGNIFICANT CHANGE UP (ref 0–6.7)
CK SERPL-CCNC: 529 U/L — HIGH (ref 30–200)
CO2 SERPL-SCNC: 23 MMOL/L — SIGNIFICANT CHANGE UP (ref 22–29)
CREAT SERPL-MCNC: 0.62 MG/DL — SIGNIFICANT CHANGE UP (ref 0.5–1.3)
EGFR: 133 ML/MIN/1.73M2 — SIGNIFICANT CHANGE UP
EOSINOPHIL # BLD AUTO: 0.03 K/UL — SIGNIFICANT CHANGE UP (ref 0–0.5)
EOSINOPHIL NFR BLD AUTO: 0.3 % — SIGNIFICANT CHANGE UP (ref 0–6)
GLUCOSE SERPL-MCNC: 118 MG/DL — HIGH (ref 70–99)
HCT VFR BLD CALC: 44.5 % — SIGNIFICANT CHANGE UP (ref 39–50)
HGB BLD-MCNC: 14.3 G/DL — SIGNIFICANT CHANGE UP (ref 13–17)
IMM GRANULOCYTES NFR BLD AUTO: 0.4 % — SIGNIFICANT CHANGE UP (ref 0–0.9)
INR BLD: 1.07 RATIO — SIGNIFICANT CHANGE UP (ref 0.88–1.16)
LYMPHOCYTES # BLD AUTO: 1.35 K/UL — SIGNIFICANT CHANGE UP (ref 1–3.3)
LYMPHOCYTES # BLD AUTO: 11.9 % — LOW (ref 13–44)
MCHC RBC-ENTMCNC: 27.8 PG — SIGNIFICANT CHANGE UP (ref 27–34)
MCHC RBC-ENTMCNC: 32.1 GM/DL — SIGNIFICANT CHANGE UP (ref 32–36)
MCV RBC AUTO: 86.6 FL — SIGNIFICANT CHANGE UP (ref 80–100)
MONOCYTES # BLD AUTO: 0.63 K/UL — SIGNIFICANT CHANGE UP (ref 0–0.9)
MONOCYTES NFR BLD AUTO: 5.6 % — SIGNIFICANT CHANGE UP (ref 2–14)
NEUTROPHILS # BLD AUTO: 9.24 K/UL — HIGH (ref 1.8–7.4)
NEUTROPHILS NFR BLD AUTO: 81.6 % — HIGH (ref 43–77)
PLATELET # BLD AUTO: 222 K/UL — SIGNIFICANT CHANGE UP (ref 150–400)
POTASSIUM SERPL-MCNC: 3.8 MMOL/L — SIGNIFICANT CHANGE UP (ref 3.5–5.3)
POTASSIUM SERPL-SCNC: 3.8 MMOL/L — SIGNIFICANT CHANGE UP (ref 3.5–5.3)
PROTHROM AB SERPL-ACNC: 12.4 SEC — SIGNIFICANT CHANGE UP (ref 10.5–13.4)
RBC # BLD: 5.14 M/UL — SIGNIFICANT CHANGE UP (ref 4.2–5.8)
RBC # FLD: 13.7 % — SIGNIFICANT CHANGE UP (ref 10.3–14.5)
SODIUM SERPL-SCNC: 138 MMOL/L — SIGNIFICANT CHANGE UP (ref 135–145)
WBC # BLD: 11.31 K/UL — HIGH (ref 3.8–10.5)
WBC # FLD AUTO: 11.31 K/UL — HIGH (ref 3.8–10.5)

## 2023-05-31 PROCEDURE — 73060 X-RAY EXAM OF HUMERUS: CPT | Mod: 26,RT

## 2023-05-31 PROCEDURE — 99285 EMERGENCY DEPT VISIT HI MDM: CPT

## 2023-05-31 PROCEDURE — 73080 X-RAY EXAM OF ELBOW: CPT | Mod: 26,RT

## 2023-05-31 RX ORDER — HYDROMORPHONE HYDROCHLORIDE 2 MG/ML
2 INJECTION INTRAMUSCULAR; INTRAVENOUS; SUBCUTANEOUS ONCE
Refills: 0 | Status: DISCONTINUED | OUTPATIENT
Start: 2023-05-31 | End: 2023-05-31

## 2023-05-31 RX ORDER — DIAZEPAM 5 MG
20 TABLET ORAL ONCE
Refills: 0 | Status: DISCONTINUED | OUTPATIENT
Start: 2023-05-31 | End: 2023-05-31

## 2023-05-31 RX ORDER — MORPHINE SULFATE 50 MG/1
8 CAPSULE, EXTENDED RELEASE ORAL ONCE
Refills: 0 | Status: DISCONTINUED | OUTPATIENT
Start: 2023-05-31 | End: 2023-05-31

## 2023-05-31 RX ORDER — KETOROLAC TROMETHAMINE 30 MG/ML
30 SYRINGE (ML) INJECTION ONCE
Refills: 0 | Status: DISCONTINUED | OUTPATIENT
Start: 2023-05-31 | End: 2023-05-31

## 2023-05-31 RX ADMIN — Medication 30 MILLIGRAM(S): at 21:48

## 2023-05-31 RX ADMIN — MORPHINE SULFATE 8 MILLIGRAM(S): 50 CAPSULE, EXTENDED RELEASE ORAL at 21:44

## 2023-05-31 RX ADMIN — Medication 20 MILLIGRAM(S): at 20:04

## 2023-05-31 RX ADMIN — HYDROMORPHONE HYDROCHLORIDE 2 MILLIGRAM(S): 2 INJECTION INTRAMUSCULAR; INTRAVENOUS; SUBCUTANEOUS at 20:04

## 2023-05-31 NOTE — ED ADULT NURSE NOTE - NSFALLRISKFACTORS_ED_ALL_ED
[facility]   [address], [facility] [address] [facility] [address]  [place] [address]  Phone: [phone]  Fax: [phone]              Jesse Norwood  1967    Encounter Date: 5/22/2018    Tina Nunez M.D.          PROGRESS NOTE:  No notes on file      No Recipients              
Bone Condition: Including osteoporosis, prolonged steroid use or metastatic bone disease/cancer

## 2023-05-31 NOTE — ED ADULT NURSE NOTE - OBJECTIVE STATEMENT
right upper arm pain, swelling, deformity status post throwing a softball, nvi to right arm, acute pain requiring immediate intervention, given 300mcg pre hospital, no open fx

## 2023-05-31 NOTE — ED ADULT NURSE NOTE - NSFALLHARMRISKINTERV_ED_ALL_ED
Assistance OOB with selected safe patient handling equipment if applicable/Assistance with ambulation/Communicate risk of Fall with Harm to all staff, patient, and family/Encourage patient to sit up slowly, dangle for a short time, stand at bedside before walking/Orthostatic vital signs/Provide visual cue: red socks, yellow wristband, yellow gown, etc/Reinforce activity limits and safety measures with patient and family/Review medications for side effects contributing to fall risk/Toileting schedule using arm’s reach rule for commode and bathroom/Bed in lowest position, wheels locked, appropriate side rails in place/Call bell, personal items and telephone in reach/Instruct patient to call for assistance before getting out of bed/chair/stretcher/Non-slip footwear applied when patient is off stretcher/[place] to call system/Physically safe environment - no spills, clutter or unnecessary equipment/Purposeful Proactive Rounding/Room/bathroom lighting operational, light cord in reach

## 2023-05-31 NOTE — ED ADULT NURSE REASSESSMENT NOTE - NS ED NURSE REASSESS COMMENT FT1
Report given to shania Loving. Patient A&Ox4, respirations even/unlabored, no apparent distress. Plan of care discussed with patient, all questions answered. Pt awaiting CT, receiving RN made aware. Pt hemodynamically stable, SpO2 100% on room air, speaking in full sentences. Pt transferred to B6R by RN without incident. Safety maintained, bed locked and in lowest position. Peripheral IV in place, patent, clean, dry, intact.

## 2023-05-31 NOTE — ED ADULT TRIAGE NOTE - CHIEF COMPLAINT QUOTE
Patient presents to ED via EMS s/p injuring his right arm.,  Per patient, patient was playing baseball and threw the ball and heard a pop in his arm.  Patient medicated with Fentanyl 200mg and Fentanyl 100mg PTA via EMS Patient presents to ED via EMS s/p injuring his right arm.,  Per patient, patient was playing baseball and threw the ball and heard a pop in his arm.  Denies any trauma to arm.   Patient medicated with Fentanyl 200mg and Fentanyl 100mg PTA via EMS.  +20 gauge angio placed to LAC by EMS

## 2023-05-31 NOTE — CONSULT NOTE ADULT - SUBJECTIVE AND OBJECTIVE BOX
Pt Name: ASA WILLETT    MRN: [id number]      Patient is a 28y Male presenting to the emergency department with a chief complaint of right arm pain.  patient was playing softball when he threw a ball from outfield and felt a pop to arm. patient found to have distal humerus fx. patient denies any numbness or weakness to hand       REVIEW OF SYSTEMS    General: Alert, responsive, in NAD    Skin/Breast: No rashes, no pruritis   	  Ophthalmologic: No visual changes. No redness.   	  ENMT:	No discharge. No swelling.    Respiratory and Thorax: No difficulty breathing. No cough.  	   Cardiovascular:	No chest pain. No palpitations.    Gastrointestinal:	 No abdominal pain. No diarrhea.     Genitourinary: No dysuria. No bleeding.    Musculoskeletal: SEE HPI.    Neurological: No sensory or motor changes.     Psychiatric: No anxiety or depression.    Hematology/Lymphatics: No swelling.    Endocrine: No Hx of diabetes.    ROS is otherwise negative.    PAST MEDICAL & SURGICAL HISTORY:  PAST MEDICAL & SURGICAL HISTORY:      Allergies: No Known Allergies      Medications: ketorolac   Injectable 30 milliGRAM(s) IV Push Once  morphine  - Injectable 8 milliGRAM(s) IV Push Once      FAMILY HISTORY:  : non-contributory    Social History: no drug use    Ambulation: Walking independently                          14.3   11.31 )-----------( 222      ( 31 May 2023 20:30 )             44.5       05-31    138  |  102  |  19.3  ----------------------------<  118<H>  3.8   |  23.0  |  0.62    Ca    9.0      31 May 2023 20:30        Vital Signs Last 24 Hrs  T(C): --  T(F): --  HR: 66 (31 May 2023 19:35) (66 - 66)  BP: 133/73 (31 May 2023 19:35) (133/73 - 133/73)  BP(mean): --  RR: 25 (31 May 2023 19:35) (25 - 25)  SpO2: 97% (31 May 2023 19:35) (97% - 97%)    Parameters below as of 31 May 2023 19:35  Patient On (Oxygen Delivery Method): room air        Daily Height in cm: 177.8 (31 May 2023 19:35)    Daily       PHYSICAL EXAM:      Appearance: Alert, responsive, in no acute distress.    Neurological: Sensation is grossly intact to light touch. 5/5 motor function of all extremities. No focal deficits or weaknesses found.    Skin: no rash on visible skin. Skin is clean, dry and intact. No bleeding. No abrasions. No ulcerations.    Vascular: 2+ distal pulses. Cap refill < 2 sec. No signs of venous insufficiency or stasis. No extremity ulcerations. No cyanosis.    Musculoskeletal:           Right Upper Extremity: positive deformity to distal humerus.  positive tenderness to humerus, no tenderness to elbow, wrist or hand,  Full motor function to hand, and wrist extension, flexion.  pulse intact, sensation intact       Imaging Studies: 2 view right humerus reveals distal humerus fx    SPLINTING   PROCEDURE NOTE: Splinting    Performed by: Vik Heath PA-C     Indication: right humerus fx     The right arm was appropriately positioned. A coaptation splint was applied with sling. Distally, the extremity was neurovascular intact following the procedure. The patient tolerated the procedure well.      A/P:  Pt is a 28y Male found to have right distal humerus fx     PLAN:   * needs CT of arm   pain control   LUIS HUITRON  will need close follow up in office     case discussed with Dr. galindo

## 2023-05-31 NOTE — ED ADULT NURSE REASSESSMENT NOTE - NS ED NURSE REASSESS COMMENT FT1
patient provided with medications over several minutes for severe pain, RN at bedside remaining with patient throughout administrateion, on pulse oximetry, initial pulse oximetry dipped requiring supplemental oxygen and patient was verbally responsive to pain and stimuli, able ot follow commands and ansewr questions, Arm was supported during xrays, and 1:1 care provided for approx 20 jminutes after medicaitons administred,

## 2023-05-31 NOTE — ED ADULT NURSE REASSESSMENT NOTE - NS ED NURSE REASSESS COMMENT FT1
Assumed care of patient at 20:30 from MAGDALENO Claros. Charting as noted. Patient A&Ox4, presents to ED c/o right arm pain. At time of assessment, patient denies current pain/discomfort, denies CP/SOB. Patient updated on the plan of care, awaiting ortho recs. Stretcher locked in lowest position, IV site flushed w/ NS. No redness, swelling or pain noted to site. No signs of acute distress noted, safety maintained. Pt remains on CM in NSR, rate 66, SpO2 100% on 2L NC. Pt administered pain medication as per order of MD Velasquez. Pt desaturated to mid 60's, and placed on nonrebreather, now sustaining SpO2 of 100% on 2L NC. Pt speaking in full sentences, mentating well.

## 2023-05-31 NOTE — ED ADULT NURSE NOTE - CHIEF COMPLAINT QUOTE
Patient presents to ED via EMS s/p injuring his right arm.,  Per patient, patient was playing baseball and threw the ball and heard a pop in his arm.  Denies any trauma to arm.   Patient medicated with Fentanyl 200mg and Fentanyl 100mg PTA via EMS.  +20 gauge angio placed to LAC by EMS

## 2023-06-01 ENCOUNTER — TRANSCRIPTION ENCOUNTER (OUTPATIENT)
Age: 28
End: 2023-06-01

## 2023-06-01 DIAGNOSIS — Z98.890 OTHER SPECIFIED POSTPROCEDURAL STATES: Chronic | ICD-10-CM

## 2023-06-01 DIAGNOSIS — S42.309A UNSPECIFIED FRACTURE OF SHAFT OF HUMERUS, UNSPECIFIED ARM, INITIAL ENCOUNTER FOR CLOSED FRACTURE: ICD-10-CM

## 2023-06-01 DIAGNOSIS — S42.401A UNSPECIFIED FRACTURE OF LOWER END OF RIGHT HUMERUS, INITIAL ENCOUNTER FOR CLOSED FRACTURE: ICD-10-CM

## 2023-06-01 LAB
MRSA PCR RESULT.: SIGNIFICANT CHANGE UP
S AUREUS DNA NOSE QL NAA+PROBE: DETECTED

## 2023-06-01 PROCEDURE — 93010 ELECTROCARDIOGRAM REPORT: CPT

## 2023-06-01 PROCEDURE — G1004: CPT

## 2023-06-01 PROCEDURE — 73200 CT UPPER EXTREMITY W/O DYE: CPT | Mod: 26,RT,MG

## 2023-06-01 PROCEDURE — 71045 X-RAY EXAM CHEST 1 VIEW: CPT | Mod: 26

## 2023-06-01 PROCEDURE — 99223 1ST HOSP IP/OBS HIGH 75: CPT | Mod: 57

## 2023-06-01 RX ORDER — ALBUTEROL 90 UG/1
2 AEROSOL, METERED ORAL EVERY 6 HOURS
Refills: 0 | Status: DISCONTINUED | OUTPATIENT
Start: 2023-06-01 | End: 2023-06-02

## 2023-06-01 RX ORDER — MORPHINE SULFATE 50 MG/1
4 CAPSULE, EXTENDED RELEASE ORAL ONCE
Refills: 0 | Status: DISCONTINUED | OUTPATIENT
Start: 2023-06-01 | End: 2023-06-01

## 2023-06-01 RX ORDER — MUPIROCIN 20 MG/G
1 OINTMENT TOPICAL
Refills: 0 | Status: DISCONTINUED | OUTPATIENT
Start: 2023-06-01 | End: 2023-06-02

## 2023-06-01 RX ORDER — LORATADINE 10 MG/1
10 TABLET ORAL DAILY
Refills: 0 | Status: DISCONTINUED | OUTPATIENT
Start: 2023-06-01 | End: 2023-06-02

## 2023-06-01 RX ORDER — ESCITALOPRAM OXALATE 10 MG/1
20 TABLET, FILM COATED ORAL DAILY
Refills: 0 | Status: DISCONTINUED | OUTPATIENT
Start: 2023-06-01 | End: 2023-06-02

## 2023-06-01 RX ORDER — CHLORHEXIDINE GLUCONATE 213 G/1000ML
1 SOLUTION TOPICAL EVERY 12 HOURS
Refills: 0 | Status: COMPLETED | OUTPATIENT
Start: 2023-06-01 | End: 2023-06-01

## 2023-06-01 RX ORDER — MONTELUKAST 4 MG/1
10 TABLET, CHEWABLE ORAL DAILY
Refills: 0 | Status: DISCONTINUED | OUTPATIENT
Start: 2023-06-01 | End: 2023-06-02

## 2023-06-01 RX ORDER — MORPHINE SULFATE 50 MG/1
4 CAPSULE, EXTENDED RELEASE ORAL EVERY 4 HOURS
Refills: 0 | Status: DISCONTINUED | OUTPATIENT
Start: 2023-06-01 | End: 2023-06-02

## 2023-06-01 RX ORDER — PRAMIPEXOLE DIHYDROCHLORIDE 0.12 MG/1
0.5 TABLET ORAL DAILY
Refills: 0 | Status: DISCONTINUED | OUTPATIENT
Start: 2023-06-01 | End: 2023-06-02

## 2023-06-01 RX ORDER — LEVOTHYROXINE SODIUM 125 MCG
50 TABLET ORAL DAILY
Refills: 0 | Status: DISCONTINUED | OUTPATIENT
Start: 2023-06-01 | End: 2023-06-02

## 2023-06-01 RX ORDER — OXYCODONE AND ACETAMINOPHEN 5; 325 MG/1; MG/1
1 TABLET ORAL ONCE
Refills: 0 | Status: DISCONTINUED | OUTPATIENT
Start: 2023-06-01 | End: 2023-06-01

## 2023-06-01 RX ORDER — ENOXAPARIN SODIUM 100 MG/ML
40 INJECTION SUBCUTANEOUS ONCE
Refills: 0 | Status: COMPLETED | OUTPATIENT
Start: 2023-06-01 | End: 2023-06-01

## 2023-06-01 RX ORDER — ARIPIPRAZOLE 15 MG/1
5 TABLET ORAL DAILY
Refills: 0 | Status: DISCONTINUED | OUTPATIENT
Start: 2023-06-01 | End: 2023-06-02

## 2023-06-01 RX ORDER — POVIDONE-IODINE 5 %
1 AEROSOL (ML) TOPICAL ONCE
Refills: 0 | Status: COMPLETED | OUTPATIENT
Start: 2023-06-01 | End: 2023-06-02

## 2023-06-01 RX ORDER — BUDESONIDE AND FORMOTEROL FUMARATE DIHYDRATE 160; 4.5 UG/1; UG/1
2 AEROSOL RESPIRATORY (INHALATION)
Refills: 0 | Status: DISCONTINUED | OUTPATIENT
Start: 2023-06-01 | End: 2023-06-02

## 2023-06-01 RX ADMIN — MUPIROCIN 1 APPLICATION(S): 20 OINTMENT TOPICAL at 18:13

## 2023-06-01 RX ADMIN — LORATADINE 10 MILLIGRAM(S): 10 TABLET ORAL at 11:52

## 2023-06-01 RX ADMIN — CHLORHEXIDINE GLUCONATE 1 APPLICATION(S): 213 SOLUTION TOPICAL at 18:14

## 2023-06-01 RX ADMIN — Medication 50 MICROGRAM(S): at 07:02

## 2023-06-01 RX ADMIN — MORPHINE SULFATE 4 MILLIGRAM(S): 50 CAPSULE, EXTENDED RELEASE ORAL at 22:32

## 2023-06-01 RX ADMIN — OXYCODONE AND ACETAMINOPHEN 1 TABLET(S): 5; 325 TABLET ORAL at 05:35

## 2023-06-01 RX ADMIN — ESCITALOPRAM OXALATE 20 MILLIGRAM(S): 10 TABLET, FILM COATED ORAL at 18:13

## 2023-06-01 RX ADMIN — MORPHINE SULFATE 4 MILLIGRAM(S): 50 CAPSULE, EXTENDED RELEASE ORAL at 07:01

## 2023-06-01 RX ADMIN — ENOXAPARIN SODIUM 40 MILLIGRAM(S): 100 INJECTION SUBCUTANEOUS at 18:13

## 2023-06-01 RX ADMIN — MONTELUKAST 10 MILLIGRAM(S): 4 TABLET, CHEWABLE ORAL at 11:52

## 2023-06-01 RX ADMIN — MORPHINE SULFATE 4 MILLIGRAM(S): 50 CAPSULE, EXTENDED RELEASE ORAL at 13:30

## 2023-06-01 RX ADMIN — ARIPIPRAZOLE 5 MILLIGRAM(S): 15 TABLET ORAL at 11:51

## 2023-06-01 RX ADMIN — MORPHINE SULFATE 4 MILLIGRAM(S): 50 CAPSULE, EXTENDED RELEASE ORAL at 18:21

## 2023-06-01 RX ADMIN — MORPHINE SULFATE 4 MILLIGRAM(S): 50 CAPSULE, EXTENDED RELEASE ORAL at 22:47

## 2023-06-01 NOTE — CONSULT NOTE ADULT - ASSESSMENT
28y Male with PMH asthma (reportedly mild), hypothyroid, OCD, Tourette's, ADHD, STEPHANIE (CPAP at night), Skin abscess with MRSA s/p drainage 2017, presented to the emergency department with a chief complaint of right arm pain.  Patient was playing softball when he threw a ball from outfield and felt a pop to arm. Found to have distal humerus fx.  Hospitalist consulted for medical clearance for ORIF planned for 6/2/23.  Patient reports decreased urine output, and decreased PO fluids after exercise yesterday.    Right humerus fracture  Splint and sling applied by ortho.  CT with right humeral fracture.  Planned for ORIF 6/2/23.  EKG and CXR ordered.    Asthma  Continue Symbicort and Singulair.  Albuterol prn.    STEPHANIE  CPAP at night (Settings are 10)    Hypothyroidism  Continue Levothyroxine.    OCD/Tourette's/ADHD  Continue home medications.  Armodafiril is nonformulary.  Patient reports he takes Lexapro 20mg PO daily - added.    DVT prophylaxis   Lovenox SQ  28y Male with PMH asthma (reportedly mild), hypothyroid, OCD, Tourette's, ADHD, STEPHANIE (CPAP at night), Skin abscess with MRSA s/p drainage 2017, presented to the emergency department with a chief complaint of right arm pain.  Patient was playing softball when he threw a ball from outfield and felt a pop to arm. Found to have distal humerus fx.  Hospitalist consulted for medical clearance for ORIF planned for 6/2/23.  Patient reports decreased urine output, and decreased PO fluids after exercise yesterday.    Right humerus fracture  Splint and sling applied by ortho.  CT with right humeral fracture.  Planned for ORIF 6/2/23.  EKG and CXR ordered.    Asthma  Continue Symbicort and Singulair.  Albuterol prn.    STEPHANIE  CPAP at night (Settings are 10)    Hypothyroidism  Continue Levothyroxine.    OCD/Tourette's/ADHD  Continue home medications.  Armodafiril is nonformulary.  Patient reports he takes Lexapro 20mg PO daily - added.    Decreased Urine output  Recommend IVF.  Bladder scan for urinary retention.  Monitor urine output.    DVT prophylaxis   Lovenox SQ

## 2023-06-01 NOTE — ED PROVIDER NOTE - CLINICAL SUMMARY MEDICAL DECISION MAKING FREE TEXT BOX
Patient neurovascular intact seen and evaluated by Ortho required muscle relaxant IV analgesia medications upon arrival for splint placement by Ortho he is still neurovascular intact post splint placement per Ortho can follow-up outpatient with  Neck CAT scan performed here acetaminophen and ibuprofen advised for pain patient understands the necessity of follow-up with Dr. Lopez

## 2023-06-01 NOTE — CONSULT NOTE ADULT - SUBJECTIVE AND OBJECTIVE BOX
PMD: Dr. Barron  Pulmonologist: Dr. Brant Marin  Psychiatrist: Dr. Yvan Emery    CC: Right arm pain    HPI:  28y Male with PMH asthma (reportedly mild), hypothyroid, OCD, Tourette's, ADHD, STEPHANIE (CPAP at night), Skin abscess with MRSA s/p drainage 2017, presented to the emergency department with a chief complaint of right arm pain.  Patient was playing softball when he threw a ball from outfield and felt a pop to arm. Found to have distal humerus fx.  Hospitalist consulted for medical clearance for ORIF planned for 6/2/23.  Patient seen and examined sitting up in the stretcher.  Pain controlled.   Patient reports decreased urine output, and decreased PO fluids after exercise yesterday.        PAST MEDICAL & SURGICAL HISTORY:  Tourette disease  OCD (obsessive compulsive disorder)  Asthma  History of ankle surgery    FAMILY HISTORY:  FH: diabetes mellitus (Father)  FH: HTN (Father, Mother)  FH: HLD (Father)  FH: Heart disease (Mother, Uncle)  FH: Glioblastoma (Father)    SOCIAL HISTORY:  Tobacco - Denies  ETOH - Occasional  Drug use - Denies    ALLERGIES:  NKDA    HOME MEDICATIONS:  Abilify 5mg PO daily  Levothyroxine 50mcg PO daily  Clarinex 10mg PO daily  Singulair 10mg PO daily  Breo inhaler daily  Spiriva 18mcg inh daily  Lexapro 20mg PO daily  Armodafinil 250mg PO daily  Mounjaro 5mg /0.5mL injection [id number]    MEDICATIONS  (STANDING):  ARIPiprazole 5 milliGRAM(s) Oral daily  budesonide 160 MICROgram(s)/formoterol 4.5 MICROgram(s) Inhaler 2 Puff(s) Inhalation two times a day  chlorhexidine 2% Cloths 1 Application(s) Topical every 12 hours  enoxaparin Injectable 40 milliGRAM(s) SubCutaneous once  levothyroxine 50 MICROGram(s) Oral daily  loratadine 10 milliGRAM(s) Oral daily  montelukast 10 milliGRAM(s) Oral daily  mupirocin 2% Ointment 1 Application(s) Both Nostrils two times a day  povidone iodine 5% Nasal Swab 1 Application(s) Both Nostrils once  pramipexole 0.5 milliGRAM(s) Oral daily    MEDICATIONS  (PRN):  albuterol    90 MICROgram(s) HFA Inhaler 2 Puff(s) Inhalation every 6 hours PRN Shortness of Breath and/or Wheezing  morphine  - Injectable 4 milliGRAM(s) IV Push every 4 hours PRN Moderate Pain (4 - 6)      REVIEW OF SYSTEMS:  CONSTITUTIONAL: No fever, weight loss, or fatigue  NECK: No pain or stiffness  RESPIRATORY: No cough, wheezing, or chills; No shortness of breath  CARDIOVASCULAR: No chest pain, palpitations, dizziness, or leg swelling  GASTROINTESTINAL: No abdominal or epigastric pain. No nausea or vomiting; No diarrhea or constipation.   GENITOURINARY: No dysuria, frequency, hematuria, or incontinence  NEUROLOGICAL: No headaches, memory loss, loss of strength, numbness, or tremors  SKIN: No itching, burning, rashes, or lesions   MUSCULOSKELETAL: RUE pain  PSYCHIATRIC: No depression, anxiety, mood swings, or difficulty sleeping      Vital Signs Last 24 Hrs  T(C): 36.6 (01 Jun 2023 11:14), Max: 36.7 (31 May 2023 20:48)  T(F): 97.8 (01 Jun 2023 11:14), Max: 98.1 (01 Jun 2023 05:20)  HR: 66 (01 Jun 2023 11:14) (64 - 82)  BP: 108/65 (01 Jun 2023 11:14) (106/64 - 133/73)  BP(mean): --  RR: 18 (01 Jun 2023 11:14) (16 - 25)  SpO2: 95% (01 Jun 2023 11:14) (94% - 100%)    Parameters below as of 01 Jun 2023 11:14  Patient On (Oxygen Delivery Method): room air      PHYSICAL EXAM:  GENERAL: NAD  HEAD:  Atraumatic, Normocephalic  NECK: Supple, No JVD, Normal thyroid  NERVOUS SYSTEM:  Alert & Oriented X3, Good concentration; Motor Strength 5/5 L upper and B/L lower extremities  CHEST/LUNG: Clear to auscultation bilaterally  HEART: Regular rate and rhythm; No murmurs, rubs, or gallops  ABDOMEN: Soft, Nontender, Nondistended; Bowel sounds present  EXTREMITIES:  2+ Peripheral Pulses, No clubbing, cyanosis, or edema; RUE with Splint and sling  SKIN: No rashes or lesions      LABS:                        14.3   11.31 )-----------( 222      ( 31 May 2023 20:30 )             44.5     05-31    138  |  102  |  19.3  ----------------------------<  118<H>  3.8   |  23.0  |  0.62    Ca    9.0      31 May 2023 20:30      PT/INR - ( 31 May 2023 20:30 )   PT: 12.4 sec;   INR: 1.07 ratio         PTT - ( 31 May 2023 20:30 )  PTT:25.9 sec      RADIOLOGY & ADDITIONAL STUDIES:  < from: CT Upper Extremity No Cont, Right (06.01.23 @ 02:46) >  ACC: [id number] EXAM:  CT UPR EXT RT   ORDERED BY: MONE BECERRIL     PROCEDURE DATE:  06/01/2023          INTERPRETATION:  CLINICAL INFORMATION: Humeral fracture occurred while   throwing a softball.    COMPARISON: Radiographs 5/31/2023.    CONTRAST/COMPLICATIONS:  IV Contrast: NONE  Oral Contrast: NONE  Complications: None reported at time of study completion    CT right humerus without intravenous contrast. Axial images obtained with   multiplanar reformats.    FINDINGS:  Acute fracture through the distal humeral shaft again demonstrated.   Oblique in orientation. Retracted proximally 3 cm, displaced medially and   anteriorly one shaft width. Anteromedial angulation and internal rotation   of the more distal portion. Underlying bony density is normal. No osseous   lesions are evident. Soft tissue swelling in the musculature adjacent to   the fracture. Humeral head maintains articulation with the bony glenoid.   Normal elbow alignment.    IMPRESSION:  Acute displaced fracture distal shaft of the humerus.    --- End of Report ---            MACKENZIE MIDDLETON MD; Attending Radiologist  This document has been electronically signed. Jun 1 2023  3:20AM    < end of copied text >

## 2023-06-01 NOTE — H&P ADULT - HISTORY OF PRESENT ILLNESS
Pt Name: ASA WILLETT    MRN: [id number]      Patient is a 28y Male presenting to the emergency department with a chief complaint of right arm pain.  patient was playing softball when he threw a ball from outfield and felt a pop to arm. patient found to have distal humerus fx. patient denies any numbness or weakness to hand              Pt Name: ASA WILLETT    MRN: [id number]      Patient is a 28y Male presenting to the emergency department with a chief complaint of right arm pain.  Patient was playing softball when he threw a ball from outfield and felt a pop to arm. Found to have distal humerus fx. He denies any numbness or weakness to hand

## 2023-06-01 NOTE — CONSULT NOTE ADULT - NS ATTEND AMEND GEN_ALL_CORE FT
Orthopaedic Trauma Surgeon Addendum:    I have personally performed a face-to-face diagnostic evaluation on this patient.  I have reviewed the physician assistant note and agree with the history, exam, and plan of care, except as noted.    Will follow up on CT due to atypical mechanism of injury to ensure no signs of pathologic fracture.     Ton Hendricks MD  Orthopaedic Trauma Surgeon  [facility] [place]
Patient seen and examined , comfortable RUE pain well controlled ,  Planned for surgery in am , medicine consulted for preop- optimization.     Vital Signs Last 24 Hrs  T(C): 36.7 (06-01-23 @ 15:22), Max: 36.7 (05-31-23 @ 20:48)  T(F): 98.1 (06-01-23 @ 15:18), Max: 98.1 (06-01-23 @ 05:20)  HR: 73 (06-01-23 @ 15:22) (64 - 82)  BP: 115/73 (06-01-23 @ 15:22) (106/64 - 133/73)  BP(mean): 87 (06-01-23 @ 15:22) (87 - 87)  RR: 18 (06-01-23 @ 15:22) (16 - 25)  SpO2: 96% (06-01-23 @ 15:22) (94% - 100%)    Lungs: CTA B/L   CVS: S1S2 , no rubs , no murmurs   RUE in splint/sling   Above noted and reviewed with NP     EKG ( 6/1 /23 ) - no acute changes from 2020   CXR - no acute pathology       Revised Cardiac Risk Assessment   [  -]History of ischemic heart disease   [-  ]History of congestive heart failure   [ - ]History of cerebrovascular disease (stroke or transient ischemic attack)   [  -]History of diabetes requiring preoperative insulin use   [ - ]Chronic kidney disease (creatinine > 2 mg/dL)   [-  ]Undergoing suprainguinal vascular, intraperitoneal, or intrathoracic surgery     0 predictors = 0.4%, 1 predictor = 1.0%, 2 predictors = 2.4%, > 3 predictors = 5.4%    * Overall this patient has a  0.4   % risk of cardiac death, nonfatal myocardial infarction, and nonfatal cardiac arrest perioperatively.     Patient does not have any known history of severe valvular disease and is not in decompensated CHF. No recent MI.   No sob/ chest pain Baseline functional capacity is > 4 METS. Patient is currently hemodynamically stable.Patient is medically optimized for  planned procedure .     Thank you for the courtesy of this consult .     Please recall if any medical issue post op .   Will sign off .

## 2023-06-01 NOTE — PATIENT PROFILE ADULT - FALL HARM RISK - HARM RISK INTERVENTIONS
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
## 2023-06-01 NOTE — PATIENT PROFILE ADULT - MST SCORE
Brief Wound Care Provider Note    Patient with increasing perianal pain. Patient was here for nursing visit and I was asked to evaluate.    Patient has area of induration inferior and lateral to previous wound. Prior incision has decreased in size and depth, no longer with significant depth or undermining. This is concerning for new fluid collection and will likely require additional I&D by surgery due to proximity to anus and prior dx of perirectal abscess.    I called Dr. Cuellar's office and left a message with his office staff to either discuss with his MA or with surgeon himself regarding my concerns. Awaiting call back.    My total time spent caring for the patient on the day of the encounter was 20 minutes, reviewing history, assessment, counseling and education, and coordination of care with calling her surgeons office.  This does not include time spent on separately billable procedures/tests.     0

## 2023-06-01 NOTE — H&P ADULT - NS ATTEND AMEND GEN_ALL_CORE FT
Orthopaedic Trauma Surgeon Addendum:    I have personally performed a face-to-face diagnostic evaluation on this patient.  I have reviewed the physician assistant note and agree with the history, exam, and plan of care, except as noted.    Will plan for OR 6/2 for ORIF of humerus    Ton Hendricks MD  Orthopaedic Trauma Surgeon  [facility] [place]

## 2023-06-01 NOTE — H&P ADULT - NSHPPHYSICALEXAM_GEN_ALL_CORE
PHYSICAL EXAM:      Appearance: Alert, responsive, in no acute distress.    Neurological: Sensation is grossly intact to light touch. 5/5 motor function of all extremities. No focal deficits or weaknesses found.    Skin: no rash on visible skin. Skin is clean, dry and intact. No bleeding. No abrasions. No ulcerations.    Vascular: 2+ distal pulses. Cap refill < 2 sec. No signs of venous insufficiency or stasis. No extremity ulcerations. No cyanosis.    Musculoskeletal:           Right Upper Extremity: positive deformity to distal humerus.  positive tenderness to humerus, no tenderness to elbow, wrist or hand,  Full motor function to hand, and wrist extension, flexion.  pulse intact, sensation intact      SPLINTING   PROCEDURE NOTE: Splinting    Performed by: Vik Heath PA-C     Indication: Right humerus fx    The Right arm was appropriately positioned. A Coaptation splint was applied. Distally, the extremity was neurovascular intact following the procedure. The patient tolerated the procedure well.

## 2023-06-01 NOTE — ED PROVIDER NOTE - OBJECTIVE STATEMENT
28y Male presenting to the emergency department with a chief complaint of right arm pain.  patient was playing softball when he threw a ball from outfield and felt a pop to arm. patient found to have distal humerus fx. patient denies any numbness or weakness to hand. no headache. no chest pain or sob. no abd pain. right upper extrem,ity pain 10/10 constant worse with movement

## 2023-06-01 NOTE — H&P ADULT - NSHPPOADEEPVENOUSTHROMB_GEN_A_CORE
Consultation - Pulmonary Medicine   Bing Gallagher 79 y o  female MRN: [id number]  Unit/Bed#: -01 Encounter: [id number]      Assessment:  1  Shortness of breath  2  COPD/emphysema  3  Chronic hypoxemic respiratory failure  4  Back pain    Plan:   Shortness of breath likely multifactorial related to her COPD/emphysema as well as back pain causing difficulty taking deep breaths  She has undergone cardiac workup for her shortness of breath as well - more chronic worsening over several months likely related to her COPD, acutely related to her back pain  She is currently on her baseline 2L NC with sats in the mid 90s, does use 3L with exertion based on recent 6 minute walk  Breathing better after neb treatment and pain meds  CTA done on admission shows no PE, shows moderate pulmonary emphysematous changes bilaterally, also shows age-indeterminate compression fractures in the thoracic spine  She had recent PFT done which shows restrictive/obstructive limitation with FEV1 of 47%  There was appreciable response to bronchodilator  She can continue Breo, duoneb 4 times per day which has been her home regimen  Can continue prednisone - would give a 5 day course  Would benefit from outpatient pulmonary rehab upon discharge, can follow up with us in the office  Will continue to follow  History of Present Illness   Physician Requesting Consult: Leilani Casey MD  Reason for Consult / Principal Problem: Shortness of breath  Hx and PE limited by: None  HPI: Bing Gallagher is a 79y o  year old female former smoker with past medical history of chronic respiratory failure on 2 L O2, COPD/emphysema, CHF who presents with complaint of worsening shortness of breath as well as worsening back pain  She finds the back pain makes it hard for her to take a deep breath  Pain does sometimes radiate around front of her ribcage    She has been undergoing workup for shortness of breath/dyspnea on exertion that has been gradually worsening over the last several months  Has followed up with Cardiology, has also seen us in the office, she was given prednisone back in December with improvement in her breathing  Continues with Breo daily, using her DuoNeb 4 times per day  She is using her 2 L continuous, 3 L with exertion  She is currently feeling better after receiving a nebulizer treatment as well as pain medicine  Consults    Review of Systems   Constitutional: Negative  HENT: Negative  Respiratory: Positive for shortness of breath  Cardiovascular: Negative  Gastrointestinal: Negative  Genitourinary: Negative  Musculoskeletal: Positive for back pain  Skin: Negative  Allergic/Immunologic: Negative  Neurological: Negative  Psychiatric/Behavioral: Negative          Historical Information   Past Medical History:   Diagnosis Date    Cataract     CHF (congestive heart failure) (HCC)     Leukocytosis      Past Surgical History:   Procedure Laterality Date    APPENDECTOMY      CYSTOCELE REPAIR      ANTERIOR COLPORRHAPHY     ORIF ANKLE FRACTURE Left 1979    TOTAL ABDOMINAL HYSTERECTOMY       Social History   Social History     Substance and Sexual Activity   Alcohol Use Not Currently    Alcohol/week: 4 0 standard drinks    Types: 4 Cans of beer per week    Frequency: 2-3 times a week    Drinks per session: 1 or 2    Binge frequency: Never     Social History     Substance and Sexual Activity   Drug Use No     E-Cigarette/Vaping    E-Cigarette Use Never User      E-Cigarette/Vaping Substances    Nicotine No     THC No     CBD No     Flavoring No     Other No     Unknown No      Social History     Tobacco Use   Smoking Status Former Smoker    Packs/day: 2 00    Years: 48 00    Pack years: 96 00    Types: Cigarettes    Quit date: 1999    Years since quittin 4   Smokeless Tobacco Never Used     Occupational History:     Family History:   Family History   Problem Relation Age of Onset    No Known Problems Mother     No Known Problems Father        Meds/Allergies   all current active meds have been reviewed, pertinent pulmonary meds have been reviewed and current meds:   Current Facility-Administered Medications   Medication Dose Route Frequency    acetaminophen (TYLENOL) tablet 975 mg  975 mg Oral Q6H [address]    albuterol (PROVENTIL HFA,VENTOLIN HFA) inhaler 2 puff  2 puff Inhalation Q6H PRN    aluminum-magnesium hydroxide-simethicone (MYLANTA) oral suspension 30 mL  30 mL Oral Q6H PRN    atorvastatin (LIPITOR) tablet 80 mg  80 mg Oral Daily    azithromycin (ZITHROMAX) tablet 500 mg  500 mg Oral Q24H    benzonatate (TESSALON PERLES) capsule 100 mg  100 mg Oral TID PRN    cefTRIAXone (ROCEPHIN) 1,000 mg in dextrose 5 % 50 mL IVPB  1,000 mg Intravenous Q24H    fluticasone-vilanterol (BREO ELLIPTA) 100-25 mcg/inh inhaler 1 puff  1 puff Inhalation Daily    guaiFENesin (MUCINEX) 12 hr tablet 600 mg  600 mg Oral Q12H VIC    heparin (porcine) subcutaneous injection 5,000 Units  5,000 Units Subcutaneous Q8H [address]    HYDROmorphone (DILAUDID) injection 0 2 mg  0 2 mg Intravenous Q3H PRN    insulin lispro (HumaLOG) 100 units/mL subcutaneous injection 1-6 Units  1-6 Units Subcutaneous TID AC    insulin lispro (HumaLOG) 100 units/mL subcutaneous injection 1-6 Units  1-6 Units Subcutaneous HS    ipratropium (ATROVENT) 0 02 % inhalation solution 0 5 mg  0 5 mg Nebulization TID    levalbuterol (XOPENEX) inhalation solution 1 25 mg  1 25 mg Nebulization TID    levothyroxine tablet 125 mcg  125 mcg Oral Daily    lidocaine (LIDODERM) 5 % patch 2 patch  2 patch Topical Daily    melatonin tablet 6 mg  6 mg Oral HS PRN    methocarbamol (ROBAXIN) tablet 500 mg  500 mg Oral Q6H PRN    montelukast (SINGULAIR) tablet 10 mg  10 mg Oral HS    polyethylene glycol (MIRALAX) packet 17 g  17 g Oral Daily    predniSONE tablet 40 mg  40 mg Oral Daily    senna (SENOKOT) tablet 8 6 mg  1 tablet Oral HS    sodium chloride 0 9 % infusion  75 mL/hr Intravenous Continuous    traMADol (ULTRAM) tablet 50 mg  50 mg Oral Q6H PRN       Allergies   Allergen Reactions    Erythromycin     Sulfa Antibiotics        Objective   Vitals: Blood pressure 147/99, pulse 75, temperature 98 8 °F (37 1 °C), temperature source Oral, resp  rate 19, height 5' 2" (1 575 m), weight 77 1 kg (169 lb 15 6 oz), SpO2 90 %  ,Body mass index is 31 09 kg/m²  Intake/Output Summary (Last 24 hours) at 2/17/2021 1019  Last data filed at 2/17/2021 0430  Gross per 24 hour   Intake 1000 ml   Output --   Net 1000 ml     Invasive Devices     Peripheral Intravenous Line            Peripheral IV 02/17/21 Left Antecubital less than 1 day                Physical Exam  Vitals signs reviewed  Constitutional:       General: She is not in acute distress  Appearance: Normal appearance  She is obese  She is not ill-appearing  HENT:      Head: Normocephalic and atraumatic  Eyes:      Pupils: Pupils are equal, round, and reactive to light  Neck:      Musculoskeletal: Normal range of motion  Cardiovascular:      Rate and Rhythm: Normal rate and regular rhythm  Pulmonary:      Effort: Pulmonary effort is normal  No respiratory distress  Breath sounds: Decreased breath sounds and wheezing present  Musculoskeletal: Normal range of motion  Right lower leg: No edema  Left lower leg: No edema  Skin:     General: Skin is warm and dry  Coloration: Skin is not pale  Findings: No erythema  Neurological:      Mental Status: She is alert and oriented to person, place, and time  Psychiatric:         Mood and Affect: Mood normal          Behavior: Behavior normal          Lab Results:   I have personally reviewed pertinent lab results  , BNP: No results found for: BNP, CBC:   Lab Results   Component Value Date    WBC 11 61 (H) 02/17/2021    HGB 12 2 02/17/2021    HCT 38 2 02/17/2021    MCV 90 02/17/2021     02/17/2021 MCH 28 6 02/17/2021    MCHC 31 9 02/17/2021    RDW 16 9 (H) 02/17/2021    MPV 9 9 02/17/2021    NRBC 0 02/17/2021   , CMP:   Lab Results   Component Value Date    SODIUM 137 02/17/2021    K 4 2 02/17/2021    CL 95 (L) 02/17/2021    CO2 34 (H) 02/17/2021    BUN 30 (H) 02/17/2021    CREATININE 1 39 (H) 02/17/2021    CALCIUM 10 6 (H) 02/17/2021    AST 29 02/17/2021    ALT 26 02/17/2021    ALKPHOS 133 (H) 02/17/2021    EGFR 35 02/17/2021     Imaging Studies: I have personally reviewed pertinent reports  and I have personally reviewed pertinent films in PACS  EKG, Pathology, and Other Studies: I have personally reviewed pertinent reports      VTE Prophylaxis: Sequential compression device (Venodyne)  and Heparin    Code Status: Level 1 - Full Code  Advance Directive and Living Will:      Power of :    POLST: no

## 2023-06-01 NOTE — ED PROVIDER NOTE - PSYCHIATRIC NEGATIVE STATEMENT, MLM
no known mental health issues. Rituxan Counseling:  I discussed with the patient the risks of Rituxan infusions. Side effects can include infusion reactions, severe drug rashes including mucocutaneous reactions, reactivation of latent hepatitis and other infections and rarely progressive multifocal leukoencephalopathy.  All of the patient's questions and concerns were addressed.

## 2023-06-01 NOTE — H&P ADULT - NSHPLABSRESULTS_GEN_ALL_CORE
2 view right humerus reveals positive distal humeral shaft fx 2 view right humerus reveals positive distal humeral shaft fx    CT shows no underlying lesion at fracture site

## 2023-06-01 NOTE — ED PROVIDER NOTE - PATIENT PORTAL LINK FT
You can access the FollowMyHealth Patient Portal offered by [facility] by registering at the following website: [email or web address]. By joining [employer]’s FollowMyHealth portal, you will also be able to view your health information using other applications (apps) compatible with our system.

## 2023-06-01 NOTE — CONSULT NOTE ADULT - NS_MD_PANP_GEN_ALL_CORE
"Subjective:      Patient ID: Michelle Mendoza is a 56 y.o. female.    Vitals:  height is 5' 3" (1.6 m) and weight is 64.9 kg (143 lb). Her temperature is 98.2 °F (36.8 °C). Her blood pressure is 137/79 and her pulse is 82. Her respiration is 19 and oxygen saturation is 96%.     Chief Complaint: Sore Throat (Throat has been hurting all week. - Entered by patient)    55yo female pt presents to the clinic with a sore throat x 1 week. Pt reports that symptoms     Sore Throat   This is a new problem. The current episode started in the past 7 days. The problem has been gradually worsening. There has been no fever. The pain is at a severity of 5/10. The pain is moderate. Pertinent negatives include no abdominal pain, ear pain or vomiting. She has had exposure to strep. 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 Treatments tried: OTC-  Cold Meds , Lozengers. The treatment provided no relief.     HENT:  Positive for sore throat. Negative for ear pain.    Gastrointestinal:  Negative for abdominal pain and vomiting.    Objective:     Physical Exam   Constitutional: She is oriented to person, place, and time. She appears well-developed. She is cooperative.  Non-toxic appearance. She does not appear ill. No distress.   HENT:   Head: Normocephalic and atraumatic.   Ears:   Right Ear: Hearing, tympanic membrane, external ear and ear canal normal.   Left Ear: Hearing, tympanic membrane, external ear and ear canal normal.   Nose: Nose normal. No mucosal edema, rhinorrhea or nasal deformity. No epistaxis. Right sinus exhibits no maxillary sinus tenderness and no frontal sinus tenderness. Left sinus exhibits no maxillary sinus tenderness and no frontal sinus tenderness.   Mouth/Throat: Uvula is midline and mucous membranes are normal. No trismus in the jaw. No uvula swelling. Posterior oropharyngeal erythema present. No oropharyngeal exudate or posterior oropharyngeal edema.   Eyes: Conjunctivae and lids are normal. No scleral icterus. "   Neck: Trachea normal and phonation normal. Neck supple. No edema present. No erythema present. No neck rigidity present.   Cardiovascular: Normal rate, regular rhythm, normal heart sounds and normal pulses.   Pulmonary/Chest: Effort normal and breath sounds normal. No respiratory distress. She has no decreased breath sounds. She has no wheezes. She has no rhonchi. She has no rales.   Abdominal: Normal appearance.   Musculoskeletal: Normal range of motion.         General: No deformity. Normal range of motion.   Neurological: She is alert and oriented to person, place, and time. She exhibits normal muscle tone. Coordination normal.   Skin: Skin is warm, dry, intact, not diaphoretic and not pale.   Psychiatric: Her speech is normal and behavior is normal. Judgment and thought content normal.   Nursing note and vitals reviewed.  Results for orders placed or performed in visit on 05/12/23   POCT Strep A, Molecular   Result Value Ref Range    Molecular Strep A, POC Negative Negative     Acceptable Yes        Assessment:     1. Viral pharyngitis        Plan:       Viral pharyngitis  -     POCT Strep A, Molecular  -     (Magic mouthwash) 1:1:1 diphenhydrAMINE(Benadryl) 12.5mg/5ml liq, aluminum & magnesium hydroxide-simethicone (Maalox), LIDOcaine viscous 2%; Swish and spit 10 mLs every 4 (four) hours as needed (sore throat). for mouth sores  Dispense: 90 mL; Refill: 0  -     fluticasone propionate (FLONASE) 50 mcg/actuation nasal spray; 1 spray (50 mcg total) by Each Nostril route once daily.  Dispense: 9.9 mL; Refill: 0  -     loratadine (CLARITIN) 10 mg tablet; Take 1 tablet (10 mg total) by mouth once daily.  Dispense: 30 tablet; Refill: 0    Pt in no acute distress. Vitals reassuring. Reviewed negative strep test results in detail with pt. Discussed OTC medications and prescriptions sent for symptom relief. . Discussed the importance of further evaluation if symptoms worsen. Patient stated verbal  understanding.    Patient Instructions   SORE THROAT:    You may gargle with hot salt water 4 times a day for the next 2 days and then you may also gargle diluted hydrogen peroxide once to twice daily to alleviate some of your throat discomfort.  Drink plenty of fluids, recommend warm tea with honey.     YOU MAY USE OVER-THE-COUNTER CEPACOL FOR SOOTHING OF YOUR THROAT.  You may wish to avoid spicy food, citrus fruits, and red sauces- as this may irritate the throat more.    You can also take a daily anti-histamine such as Zyrtec, Claritin, Xyzal, OR Allegra-IN DAYTIME; NON DROWSY) AND/OR Benadryl- AT NIGHT; DROWSY) to help with runny nose/sneezing/sore throat/cough.    If your symptoms do not improve, you should return to this clinic. If your symptoms worsen, go to the emergency room.                              Attending and PA/NP shared services statement (NON-critical care):

## 2023-06-02 ENCOUNTER — TRANSCRIPTION ENCOUNTER (OUTPATIENT)
Age: 28
End: 2023-06-02

## 2023-06-02 VITALS
RESPIRATION RATE: 13 BRPM | TEMPERATURE: 98 F | SYSTOLIC BLOOD PRESSURE: 104 MMHG | OXYGEN SATURATION: 94 % | DIASTOLIC BLOOD PRESSURE: 67 MMHG | HEART RATE: 73 BPM

## 2023-06-02 PROCEDURE — 73060 X-RAY EXAM OF HUMERUS: CPT

## 2023-06-02 PROCEDURE — 71045 X-RAY EXAM CHEST 1 VIEW: CPT

## 2023-06-02 PROCEDURE — G1004: CPT

## 2023-06-02 PROCEDURE — 96374 THER/PROPH/DIAG INJ IV PUSH: CPT

## 2023-06-02 PROCEDURE — 96375 TX/PRO/DX INJ NEW DRUG ADDON: CPT

## 2023-06-02 PROCEDURE — 87640 STAPH A DNA AMP PROBE: CPT

## 2023-06-02 PROCEDURE — 86901 BLOOD TYPING SEROLOGIC RH(D): CPT

## 2023-06-02 PROCEDURE — 99285 EMERGENCY DEPT VISIT HI MDM: CPT | Mod: 25

## 2023-06-02 PROCEDURE — 24515 OPTX HUMRL SHFT FX PLATE/SCR: CPT | Mod: RT

## 2023-06-02 PROCEDURE — 82550 ASSAY OF CK (CPK): CPT

## 2023-06-02 PROCEDURE — 87641 MR-STAPH DNA AMP PROBE: CPT

## 2023-06-02 PROCEDURE — C9399: CPT

## 2023-06-02 PROCEDURE — 36415 COLL VENOUS BLD VENIPUNCTURE: CPT

## 2023-06-02 PROCEDURE — 73200 CT UPPER EXTREMITY W/O DYE: CPT | Mod: MG

## 2023-06-02 PROCEDURE — 73080 X-RAY EXAM OF ELBOW: CPT

## 2023-06-02 PROCEDURE — 76000 FLUOROSCOPY <1 HR PHYS/QHP: CPT

## 2023-06-02 PROCEDURE — 82553 CREATINE MB FRACTION: CPT

## 2023-06-02 PROCEDURE — 85025 COMPLETE CBC W/AUTO DIFF WBC: CPT

## 2023-06-02 PROCEDURE — 86850 RBC ANTIBODY SCREEN: CPT

## 2023-06-02 PROCEDURE — 80048 BASIC METABOLIC PNL TOTAL CA: CPT

## 2023-06-02 PROCEDURE — C1713: CPT

## 2023-06-02 PROCEDURE — 85730 THROMBOPLASTIN TIME PARTIAL: CPT

## 2023-06-02 PROCEDURE — 86900 BLOOD TYPING SEROLOGIC ABO: CPT

## 2023-06-02 PROCEDURE — 93005 ELECTROCARDIOGRAM TRACING: CPT

## 2023-06-02 PROCEDURE — 99221 1ST HOSP IP/OBS SF/LOW 40: CPT

## 2023-06-02 PROCEDURE — 85610 PROTHROMBIN TIME: CPT

## 2023-06-02 DEVICE — SCREW CORT S-T 3.5X40MM: Type: IMPLANTABLE DEVICE | Site: RIGHT | Status: FUNCTIONAL

## 2023-06-02 DEVICE — SCREW CORT S-T 3.5X30MM: Type: IMPLANTABLE DEVICE | Site: RIGHT | Status: FUNCTIONAL

## 2023-06-02 DEVICE — IMPLANTABLE DEVICE: Type: IMPLANTABLE DEVICE | Site: RIGHT | Status: FUNCTIONAL

## 2023-06-02 DEVICE — SCREW CORT S-T 3.5X36MM: Type: IMPLANTABLE DEVICE | Site: RIGHT | Status: FUNCTIONAL

## 2023-06-02 DEVICE — SCREW CORT S-T 3.5X28MM: Type: IMPLANTABLE DEVICE | Site: RIGHT | Status: FUNCTIONAL

## 2023-06-02 RX ORDER — ACETAMINOPHEN 500 MG
1 TABLET ORAL
Qty: 0 | Refills: 0 | DISCHARGE
Start: 2023-06-02

## 2023-06-02 RX ORDER — OXYCODONE AND ACETAMINOPHEN 5; 325 MG/1; MG/1
1 TABLET ORAL
Qty: 28 | Refills: 0
Start: 2023-06-02 | End: 2023-06-08

## 2023-06-02 RX ORDER — HYDROMORPHONE HYDROCHLORIDE 2 MG/ML
4 INJECTION INTRAMUSCULAR; INTRAVENOUS; SUBCUTANEOUS
Refills: 0 | Status: DISCONTINUED | OUTPATIENT
Start: 2023-06-02 | End: 2023-06-02

## 2023-06-02 RX ORDER — ACETAMINOPHEN 500 MG
975 TABLET ORAL EVERY 8 HOURS
Refills: 0 | Status: DISCONTINUED | OUTPATIENT
Start: 2023-06-02 | End: 2023-06-02

## 2023-06-02 RX ORDER — CEFAZOLIN SODIUM 1 G
2000 VIAL (EA) INJECTION ONCE
Refills: 0 | Status: DISCONTINUED | OUTPATIENT
Start: 2023-06-02 | End: 2023-06-02

## 2023-06-02 RX ORDER — SODIUM CHLORIDE 9 MG/ML
1000 INJECTION, SOLUTION INTRAVENOUS
Refills: 0 | Status: DISCONTINUED | OUTPATIENT
Start: 2023-06-02 | End: 2023-06-02

## 2023-06-02 RX ORDER — CEFAZOLIN SODIUM 1 G
2000 VIAL (EA) INJECTION
Refills: 0 | Status: DISCONTINUED | OUTPATIENT
Start: 2023-06-02 | End: 2023-06-02

## 2023-06-02 RX ORDER — HYDROMORPHONE HYDROCHLORIDE 2 MG/ML
0.5 INJECTION INTRAMUSCULAR; INTRAVENOUS; SUBCUTANEOUS
Refills: 0 | Status: DISCONTINUED | OUTPATIENT
Start: 2023-06-02 | End: 2023-06-02

## 2023-06-02 RX ORDER — OXYCODONE HYDROCHLORIDE 5 MG/1
5 TABLET ORAL
Refills: 0 | Status: DISCONTINUED | OUTPATIENT
Start: 2023-06-02 | End: 2023-06-02

## 2023-06-02 RX ORDER — ONDANSETRON 8 MG/1
4 TABLET, FILM COATED ORAL ONCE
Refills: 0 | Status: DISCONTINUED | OUTPATIENT
Start: 2023-06-02 | End: 2023-06-02

## 2023-06-02 RX ORDER — ASPIRIN/CALCIUM CARB/MAGNESIUM 324 MG
325 TABLET ORAL DAILY
Refills: 0 | Status: DISCONTINUED | OUTPATIENT
Start: 2023-06-03 | End: 2023-06-02

## 2023-06-02 RX ORDER — HYDROMORPHONE HYDROCHLORIDE 2 MG/ML
1 INJECTION INTRAMUSCULAR; INTRAVENOUS; SUBCUTANEOUS
Refills: 0 | Status: DISCONTINUED | OUTPATIENT
Start: 2023-06-02 | End: 2023-06-02

## 2023-06-02 RX ORDER — OXYCODONE HYDROCHLORIDE 5 MG/1
10 TABLET ORAL
Refills: 0 | Status: DISCONTINUED | OUTPATIENT
Start: 2023-06-02 | End: 2023-06-02

## 2023-06-02 RX ORDER — SENNOSIDES/DOCUSATE SODIUM 8.6MG-50MG
2 TABLET ORAL
Qty: 14 | Refills: 0
Start: 2023-06-02 | End: 2023-06-08

## 2023-06-02 RX ADMIN — Medication 2000 MILLIGRAM(S): at 14:08

## 2023-06-02 RX ADMIN — HYDROMORPHONE HYDROCHLORIDE 1 MILLIGRAM(S): 2 INJECTION INTRAMUSCULAR; INTRAVENOUS; SUBCUTANEOUS at 11:05

## 2023-06-02 RX ADMIN — Medication 50 MICROGRAM(S): at 06:03

## 2023-06-02 RX ADMIN — MORPHINE SULFATE 4 MILLIGRAM(S): 50 CAPSULE, EXTENDED RELEASE ORAL at 02:32

## 2023-06-02 RX ADMIN — MUPIROCIN 1 APPLICATION(S): 20 OINTMENT TOPICAL at 06:06

## 2023-06-02 RX ADMIN — Medication 1 APPLICATION(S): at 06:33

## 2023-06-02 RX ADMIN — HYDROMORPHONE HYDROCHLORIDE 1 MILLIGRAM(S): 2 INJECTION INTRAMUSCULAR; INTRAVENOUS; SUBCUTANEOUS at 11:06

## 2023-06-02 RX ADMIN — MORPHINE SULFATE 4 MILLIGRAM(S): 50 CAPSULE, EXTENDED RELEASE ORAL at 03:02

## 2023-06-02 RX ADMIN — HYDROMORPHONE HYDROCHLORIDE 1 MILLIGRAM(S): 2 INJECTION INTRAMUSCULAR; INTRAVENOUS; SUBCUTANEOUS at 10:53

## 2023-06-02 RX ADMIN — Medication 975 MILLIGRAM(S): at 14:08

## 2023-06-02 NOTE — DISCHARGE NOTE PROVIDER - CARE PROVIDER_API CALL
Ton Hendricks  Orthopaedic Surgery  [address] [place] [id number]  Phone: [phone]  Fax: [phone]  Follow Up Time:

## 2023-06-02 NOTE — BRIEF OPERATIVE NOTE - OPERATION/FINDINGS
spiral midshaft fracture    implants: 10 hole 3.5 plate with 7 3.5 screws  nerve crosses at 4th hole from proximal (lag screw)

## 2023-06-02 NOTE — DISCHARGE NOTE PROVIDER - HOSPITAL COURSE
The patient underwent a RIGHT HUMERUS OPEN REDUCTION AND INTERNAL FIXATION on 6/2/23 for treatment of a RIGHT HUMERUS fracture. The patient received antibiotics consistent with SCIP guidelines. The patient was medically cleared and underwent the procedure and had no intra-operative complications. Post-operatively, the patient was seen by medicine and PT. The patient received ASA for DVTP. The patient received pain medications per orthopedic pain management protocol and the pain was appropriately controlled. Patient was evaluated by PT and instructed on gait training. The patient is weight bearing as tolerated. The patient did not have any post-operative medical complications. The patient was discharged in stable condition.

## 2023-06-02 NOTE — DISCHARGE NOTE PROVIDER - NSDCCPCAREPLAN_GEN_ALL_CORE_FT
PRINCIPAL DISCHARGE DIAGNOSIS  Diagnosis: Fracture of distal end of right humerus  Assessment and Plan of Treatment:

## 2023-06-02 NOTE — DISCHARGE NOTE NURSING/CASE MANAGEMENT/SOCIAL WORK - PATIENT PORTAL LINK FT
You can access the FollowMyHealth Patient Portal offered by [facility] by registering at the following website: [email or web address]. By joining [employer]’s FollowMyHealth portal, you will also be able to view your health information using other applications (apps) compatible with our system.

## 2023-06-02 NOTE — DISCHARGE NOTE PROVIDER - NSDCFUADDINST_GEN_ALL_CORE_FT
The patient will be seen in the office in 3 weeks for wound check. Patient may shower on POD#3, dressing can be removed on POD#7.  The patient will contact the office if the wound becomes red, has increasing pain, develops bleeding or discharge, an injury occurs, or has other concerns. The patient will continue ASA for 4 weeks and for DVTP. The patient will take OXYCODONE and TYLENOL for pain control and titrate according to prescription and patient needs. The patient is weight bearing as tolerated on the RIGHT UPPER extremity. The patient is recommended to elevated the affected extremity to reduce swelling. Call the office with any questions or concerns.

## 2023-06-02 NOTE — DISCHARGE NOTE NURSING/CASE MANAGEMENT/SOCIAL WORK - NSDCPEFALRISK_GEN_ALL_CORE
For information on Fall & Injury Prevention, visit: [email or web address] OR  [email or web address] OR  https://www.cdc.gov/steadi/patient.html

## 2023-06-03 RX ORDER — ASPIRIN/CALCIUM CARB/MAGNESIUM 324 MG
1 TABLET ORAL
Qty: 28 | Refills: 0
Start: 2023-06-03 | End: 2023-06-30

## 2023-06-08 ENCOUNTER — TRANSCRIPTION ENCOUNTER (OUTPATIENT)
Age: 28
End: 2023-06-08

## 2023-06-09 ENCOUNTER — NON-APPOINTMENT (OUTPATIENT)
Age: 28
End: 2023-06-09

## 2023-06-12 ENCOUNTER — TRANSCRIPTION ENCOUNTER (OUTPATIENT)
Age: 28
End: 2023-06-12

## 2023-06-16 ENCOUNTER — TRANSCRIPTION ENCOUNTER (OUTPATIENT)
Age: 28
End: 2023-06-16

## 2023-06-20 ENCOUNTER — APPOINTMENT (OUTPATIENT)
Dept: ORTHOPEDIC SURGERY | Facility: CLINIC | Age: 28
End: 2023-06-20
Payer: COMMERCIAL

## 2023-06-20 DIAGNOSIS — S42.301A UNSPECIFIED FRACTURE OF SHAFT OF HUMERUS, RIGHT ARM, INITIAL ENCOUNTER FOR CLOSED FRACTURE: ICD-10-CM

## 2023-06-20 PROCEDURE — 99024 POSTOP FOLLOW-UP VISIT: CPT

## 2023-06-20 PROCEDURE — 73060 X-RAY EXAM OF HUMERUS: CPT | Mod: 26,RT

## 2023-06-20 NOTE — HISTORY OF PRESENT ILLNESS
[Clean/Dry/Intact] : clean, dry and intact [Neuro Intact] : an unremarkable neurological exam [Vascular Intact] : ~T peripheral vascular exam normal [Xray (Date:___)] : [unfilled] Xray -  [Hardware in Good Position] : hardware in good position [Doing Well] : is doing well [No Sign of Infection] : is showing no signs of infection [Adequate Pain Control] : has adequate pain control [Erythema] : not erythematous [Discharge] : absent of discharge [Swelling] : not swollen [Dehiscence] : not dehisced [de-identified] : s/p open reduction internal fixation right humeral shaft fracture. 6/2/23 [de-identified] : 29 yo m s/p open reduction internal fixation right humeral shaft fracture. 6/2/23 here for post op visit.Patient is doing well.  He denies pain at this time. He has not started any PT yet.  [de-identified] : r [de-identified] : xrays right humerus 2 views [de-identified] : Patient can start physical therapy, WBAT, AAROM/PROM as tolerated.\par He will call for any concerns, otherwise, he will return to office in 1 month for eval and xrays right humerus

## 2023-06-21 PROBLEM — F95.2 TOURETTE'S DISORDER: Chronic | Status: ACTIVE | Noted: 2023-06-01

## 2023-06-21 PROBLEM — J45.909 UNSPECIFIED ASTHMA, UNCOMPLICATED: Chronic | Status: ACTIVE | Noted: 2023-06-01

## 2023-06-21 PROBLEM — F42.9 OBSESSIVE-COMPULSIVE DISORDER, UNSPECIFIED: Chronic | Status: ACTIVE | Noted: 2023-06-01

## 2023-07-12 ENCOUNTER — APPOINTMENT (OUTPATIENT)
Dept: BARIATRICS/WEIGHT MGMT | Facility: CLINIC | Age: 28
End: 2023-07-12

## 2023-07-12 ENCOUNTER — APPOINTMENT (OUTPATIENT)
Age: 28
End: 2023-07-12

## 2023-07-18 ENCOUNTER — TRANSCRIPTION ENCOUNTER (OUTPATIENT)
Age: 28
End: 2023-07-18

## 2023-07-19 ENCOUNTER — APPOINTMENT (OUTPATIENT)
Dept: ORTHOPEDIC SURGERY | Facility: CLINIC | Age: 28
End: 2023-07-19
Payer: COMMERCIAL

## 2023-07-19 ENCOUNTER — RX RENEWAL (OUTPATIENT)
Age: 28
End: 2023-07-19

## 2023-07-19 PROCEDURE — 99024 POSTOP FOLLOW-UP VISIT: CPT

## 2023-07-19 PROCEDURE — 73060 X-RAY EXAM OF HUMERUS: CPT

## 2023-07-19 NOTE — HISTORY OF PRESENT ILLNESS
[Clean/Dry/Intact] : clean, dry and intact [Healed] : healed [Erythema] : not erythematous [Discharge] : absent of discharge [Swelling] : not swollen [Neuro Intact] : an unremarkable neurological exam [Vascular Intact] : ~T peripheral vascular exam normal [Xray (Date:___)] : [unfilled] Xray -  [Callus Formation] : callus formation [Hardware in Good Position] : hardware in good position [Good Overall Alignment] : good overall alignment [Doing Well] : is doing well [Excellent Pain Control] : has excellent pain control [No Sign of Infection] : is showing no signs of infection [de-identified] : s/p open reduction internal fixation right humeral shaft fracture. 6/2/23 [de-identified] : 27 y/o M s/p open reduction internal fixation right humeral shaft fracture. 6/2/23 [de-identified] : 2 views of the right humerus were obtained today. [de-identified] : No restrictions at the present time.  May advance to all activity as tolerated.\par \par No orthopedic trauma intervention is required but we will help facilitate future care as needed. The patient may follow up as needed.\par \par Ton Hendricks MD\par Orthopaedic Trauma Surgeon\par [facility]\par [facility] [place]\par Director Orthopaedic Trauma, [facility]\par \par \par \par

## 2023-07-27 ENCOUNTER — RX RENEWAL (OUTPATIENT)
Age: 28
End: 2023-07-27

## 2023-07-28 ENCOUNTER — APPOINTMENT (OUTPATIENT)
Dept: BARIATRICS/WEIGHT MGMT | Facility: CLINIC | Age: 28
End: 2023-07-28

## 2023-08-07 ENCOUNTER — APPOINTMENT (OUTPATIENT)
Dept: ORTHOPEDIC SURGERY | Facility: CLINIC | Age: 28
End: 2023-08-07
Payer: COMMERCIAL

## 2023-08-07 ENCOUNTER — NON-APPOINTMENT (OUTPATIENT)
Age: 28
End: 2023-08-07

## 2023-08-07 DIAGNOSIS — S42.331D DISPLACED OBLIQUE FRACTURE OF SHAFT OF HUMERUS, RIGHT ARM, SUBSEQUENT ENCOUNTER FOR FRACTURE WITH ROUTINE HEALING: ICD-10-CM

## 2023-08-07 PROCEDURE — 73060 X-RAY EXAM OF HUMERUS: CPT | Mod: 26,RT

## 2023-08-07 PROCEDURE — 99024 POSTOP FOLLOW-UP VISIT: CPT

## 2023-08-07 NOTE — PHYSICAL EXAM
[de-identified] : RUE:  swollen upper arm, non-tender, elbow flexion to 90 degrees. wound well healed.   [de-identified] : xrays right humerus: 2  views:  hardware inplace,

## 2023-08-07 NOTE — HISTORY OF PRESENT ILLNESS
[Healed] : healed [Erythema] : not erythematous [Discharge] : absent of discharge [Dehiscence] : not dehisced [de-identified] : pain in arm [de-identified] : 18 yo m s/p ORIF right humeral shaft on 6/2/23 comes to office today c/o pain in arm.  He reports that he threw a dodge ball yesterday and then  moved his arm  and felt a pain. The pain has continued and he is here for evaluation.  He took Motrin and has been icing it since last night and does feel a little better. [de-identified] : swollen upper arm,  elbow flex to 90. [de-identified] : xrays right humerus 2 views: hardware in place, middle screw appears to be broken [de-identified] : Patient will continue rest, ice, NSAIDs, hold off on PT today.  He will call back in the next few days if he has not improved.

## 2023-08-07 NOTE — HISTORY OF PRESENT ILLNESS
[Healed] : healed [Erythema] : not erythematous [Discharge] : absent of discharge [Dehiscence] : not dehisced [de-identified] : pain in arm [de-identified] : 20 yo m s/p ORIF right humeral shaft on 6/2/23 comes to office today c/o pain in arm.  He reports that he threw a dodge ball yesterday and then  moved his arm  and felt a pain. The pain has continued and he is here for evaluation.  He took Motrin and has been icing it since last night and does feel a little better. [de-identified] : swollen upper arm,  elbow flex to 90. [de-identified] : xrays right humerus 2 views: hardware in place, middle screw appears to be broken [de-identified] : Patient will continue rest, ice, NSAIDs, hold off on PT today.  He will call back in the next few days if he has not improved.

## 2023-08-07 NOTE — ED PROVIDER NOTE - OBJECTIVE STATEMENT
What Is The Reason For Today's Visit?: Preventative Skin Check 26M with PMH pituitary adenoma, Te[place]es, OCD, ADD, asthma, hypothyroidism, restless leg syndrome presents with complaints of balance issues. Symptoms began on Friday, pt feels unsteady and wobbly on his feet. Doesn't feel like room is spinning. No nausea or emesis. No syncope, lightheadedness. Denies chest pain, sob, fevers, chills, diarrhea. Recent visit to [place] and [place], was [place].     Meds- ability, Lexapro, singulair, levothyroxine, loratadine, pramipexole Additional History: Pt states here for skin check, no concerns.

## 2023-08-08 PROBLEM — S42.331D CLOSED DISPLACED OBLIQUE FRACTURE OF SHAFT OF RIGHT HUMERUS WITH ROUTINE HEALING, SUBSEQUENT ENCOUNTER: Status: ACTIVE | Noted: 2023-06-07

## 2023-08-09 ENCOUNTER — APPOINTMENT (OUTPATIENT)
Dept: ORTHOPEDIC SURGERY | Facility: CLINIC | Age: 28
End: 2023-08-09

## 2023-08-10 ENCOUNTER — TRANSCRIPTION ENCOUNTER (OUTPATIENT)
Age: 28
End: 2023-08-10

## 2023-08-11 ENCOUNTER — NON-APPOINTMENT (OUTPATIENT)
Age: 28
End: 2023-08-11

## 2023-08-16 ENCOUNTER — APPOINTMENT (OUTPATIENT)
Dept: PULMONOLOGY | Facility: CLINIC | Age: 28
End: 2023-08-16
Payer: COMMERCIAL

## 2023-08-16 VITALS
WEIGHT: 208 LBS | HEART RATE: 78 BPM | DIASTOLIC BLOOD PRESSURE: 80 MMHG | TEMPERATURE: 97.6 F | OXYGEN SATURATION: 98 % | RESPIRATION RATE: 16 BRPM | SYSTOLIC BLOOD PRESSURE: 130 MMHG | HEIGHT: 70 IN | BODY MASS INDEX: 29.78 KG/M2

## 2023-08-16 DIAGNOSIS — R94.2 ABNORMAL RESULTS OF PULMONARY FUNCTION STUDIES: ICD-10-CM

## 2023-08-16 PROCEDURE — 95012 NITRIC OXIDE EXP GAS DETER: CPT

## 2023-08-16 PROCEDURE — 99214 OFFICE O/P EST MOD 30 MIN: CPT | Mod: 25

## 2023-08-16 PROCEDURE — 94010 BREATHING CAPACITY TEST: CPT

## 2023-08-16 NOTE — PROCEDURE
[FreeTextEntry1] : PFT reveals normal flows, with an FEV1 of 4.47 L, which is 98% of predicted, with a normal flow volume loop. PFTs were performed to evaluate for Asthma  FENO was 33; a normal value being less than 25 Fractional exhaled nitric oxide (FENO) is regarded as a simple, noninvasive method for assessing eosinophilic airway inflammation. Produced by a variety of cells within the lung, nitric oxide (NO) concentrations are generally low in healthy individuals. However, high concentrations of NO appear to be involved in nonspecific host defense mechanisms and chronic inflammatory diseases such as asthma. The American Thoracic Society (ATS) therefore has recommended using FENO to aid in the diagnosis and monitoring of eosinophilic airway inflammation and asthma, and for identifying steroid responsive individuals whose chronic respiratory symptoms may be caused by airway inflammation.

## 2023-08-16 NOTE — ASSESSMENT
[FreeTextEntry1] : Mr. Mathur is a 28 year old male with a history of SOB, GERD, poor sleep, Tourette's syndrome, asthma, allergy, ADD, sleep apnea, ?RLS, insomnia, obesity, OCD, and anxiety presenting to the office for a follow up pulmonary re-evaluation - +food allergies. Dx of hypogonadism, hypothyroidism, pituitary tumor, allergies- improved - s/p covid asthma 11/2022 - #1 issue isorthopedic     His shortness of breath is multifactorial due to: -obesity/out of shape -poor breathing mechanics Less likely -?CAD -?asthma  problem 1: STEPHANIE- ?still present - HSS repeat -Settings: Dreamstation APAP CPAP mode of 10 cm H2O with FFM ([facility]) -s/p off Modafinil 200 mg QAM due to "crashing"   -add Nuvigil 250 mg QAM (ISTOP) -Sleep apnea is associated with adverse clinical consequences which an affect most organ systems.  Cardiovascular disease risk includes arrhythmias, atrial fibrillation, hypertension, coronary artery disease, and stroke. Metabolic disorders include diabetes type 2, non-alcoholic fatty liver disease. Mood disorder especially depression; and cognitive decline especially in the elderly. Associations with  chronic reflux/Leahy's esophagus some but not all inclusive.  -Reasons  include arousal consistent with hypopnea; respiratory events most prominent in REM sleep or supine position; therefore sleep staging and body position are important for accurate diagnosis and estimation of AHI.   Problem 1A: Suspected COVID-19 infection (not found) -s/p COVID-19 vaccine x3  -s/p Alkaseltzer cold and flu  -s/p quarantine for 10 days  COVID-19 precautionary Immune Support Recommendations: -OTC Vitamin C 500mg BID  -OTC Quercetin 250-500mg BID  -OTC Zinc 75-100mg per day  -OTC Melatonin 1 or 2mg a night  -OTC Vitamin D 1-4000mg per day  -OTC Tonic Water 8oz per day -Water 0.5-1 gallon per day Asthma and COVID19: You need to make sure your asthma is under control. This often requires the use of inhaled corticosteroids (and sometimes oral corticosteroids). Inhaled corticosteroids do not likely reduce your immune system's ability to fight infections, but oral corticosteroids may. It is important to use the steps above to protect yourself to limit your exposure to any respiratory virus.   Problem 1B: s/p Pre-Op Clearance for Bariatric Surgery - (aborted) -at this point in time there are no absolute pulmonary contraindications to go forward with the planned procedure  -at the time of surgery s/he should have optimal pain control, incentive spirometry, early ambulation, DVT and GI prophylaxis.   problem 2: insomnia/poor sleep (improved) -Recommended to use Insomnitol (take 1st) / Requip .5 mg -recommended to use Sleep Guard (2nd, if he wakes up) -Recommended Perrigo OTC  -recommended to use sunglasses 30 minutes before bedtime and to try room darkening window shades -Good sleep hygiene was encouraged including avoiding watching television an hour before bed, keeping caffeine at a low,  avoiding reading, television, or anything, in bed, no drinking any liquids three hours before bedtime, and only getting into bed when tired and ready for sleep.   problem 3: RLS - He is s/p blood work, which revealed: ferritin level (normal), iron binding level (normal), testosterone level (low), TFT (normal) and vitamin D level (normal) - continue  Mirapex 0.5mg QHS -Restless Legs Syndrome (RLS), also known as Stewart-Ekbom Disease, is a common sleep -related movement disorder. About 1 in 10 adults in the [place] have problems from restless leg syndrome. It also can be seen in about 2% of children. Women are twice as likely as men to have RLS. People with RLS will have symptoms  most often during times when they are less active, especially at bedtime. RLS most often causes an overwhelming urge to move your legs and sometimes other parts of your body. This urge is associated with unpleasant sensations in different parts of th body. The symptoms can be mild to severe and can affect your ability to go to sleep and stay asleep. People with RLS often sleep less at night and feel more tired during the day.   problem 4: abnormal PFTs-  c/w Asthma  -MCT c/w asthma -confirms sleep apnea - inspiratory limb flattened -continue Breo 200 1 inhalation QD or Symbicort 160 2 inhalations BID  -continue Ventolin 2 puffs Q6H  -continue Singulair 10 mg QHS   problem 5: allergy sinus  -continue Olopatadine 0.6% 1 sniff BID  - add Clarinex 5 mg QAM Environmental measures for allergies were encouraged including mattress and pillow cover, air purifier, and environmental controls   problem 6: residual fatigue -s/p Modafinil 200 mg QAM (5AM) - I-STOP reviewed due to "crashing"  -add Nuvigil 250 mg QAM (ISTOP) -add Boron 6 mg QD  -endocrinology results f/u   Problem 7: GERD - Continue Prilosec 20 mg QAM before breakfast -continue Pepcid 40mg QHS  - Cameron Feldman - c/w mild GERD/HH   Things to avoid including overeating, spicy foods, tight clothing, eating within three hours of bed, this list is not all inclusive.  -Rule of 2s: avoid eating too much, eating too late, eating too spicy, eating two hours before bed -For treatment of reflux, possible options discussed including diet control, H2 blockers, PPIs, as well as coating motility agents discussed as treatment options. Timing of meals and proximity of last meal to sleep were discussed. If symptoms persist, a formal gastrointestinal evaluation is needed.   problem 8: low testosterone (off Rx) -recommended supplemental [place] 6mg QD - restart 4/2023 -Off testosterone Rx ([facility]) -Referred to endocrinologist for further evaluation (Mathieu) - Referred to urologist (Dave Hoenig)  problem 9: poor breathing mechanics -Recommended Ramón De Dios and David breathing techniques  -Proper breathing techniques were reviewed with an emphasis of exhalation. Patient instructed to breath in for 1 second and out for four seconds. Patient was encouraged to not talk while walking.   problem 10: obesity/out of shape - off Mounjaro as of 6/2023  - [facility] Quinones 10 days detox  - recommended to see Dr. Luca Gill et al -recommended to increase protein and decrease carbohydrates -recommended to reduce caffeine and increase water intake -Weight loss, exercise, and diet control were discussed and are highly encouraged. Treatment options were given such as, aqua therapy, and contacting a nutritionist. Recommended to use the elliptical, stationary bike, refrain from use of treadmill. Mindful eating was explained to the patient. Obesity is associated with worsening asthma, shortness of breath, and potential for cardiac disease, diabetes, and other underlying medical conditions.  Problem 11a: COVID-19 precautionary Immune Support Recommendations: -s/p COVID-19 11/2022 -OTC Vitamin C 500mg BID  -OTC Quercetin 250-500mg BID  -OTC Zinc 75-100mg per day  -OTC Melatonin 1 or 2mg a night  -OTC Vitamin D 1-4000mg per day  -OTC Tonic Water 8oz per day -Water 0.5-1 gallon per day  problem 11: health maintenance- [facility] Stretches  -Nocturnal sweats - Recommend pharmacy consult regarding his medication -s/p Covid-19 vaccine Pfizer x3 -s/p Influenza vaccine 2023 -recommended strep pneumonia vaccines: Prevnar-13 vaccine, followed by Pneumo vaccine 23 one year following -recommended early intervention for URIs -recommended regular osteoporosis evaluations -recommended early dermatological evaluations -recommended after the age of 50 to the age of 70, colonoscopy every 5 years     Follow up in 4 months  Patient is encouraged to call with any changes, concerns or questions.

## 2023-08-16 NOTE — HISTORY OF PRESENT ILLNESS
[FreeTextEntry1] : Mr. Mathur is a 28 year old male with a history of abnormal PFTs, GERD, low testosterone, STEPHANIE on CPAP, overweight, poor sleep, RLS, and SOB presenting to the office today for a follow up visit. His chief complaint is  - he notes he is feeling great - he notes being down 125 pounds over his weight loss journey - he notes he had a fracture in his humerus and had surgery - he notes eating well - he notes sleeping well - he notes trying to not use his CPAP and he mentioned he was not snoring - vision is stable - he notes work is going well  - he notes his reflux is extremely improved  - he notes he has a DEXA scan scheduled on Sunday - he notes his goal weight is 190-195 lbs - he notes he has been off Mounjaro since May - he notes taking Armadaphenol  -he denies any headaches, nausea, emesis, fever, chills, sweats, chest pain, chest pressure, coughing, wheezing, palpitations, constipation, diarrhea, vertigo, dysphagia, heartburn, reflux, itchy eyes, itchy ears, leg swelling, leg pain, arthralgias, myalgias, or sour taste in the mouth.

## 2023-08-16 NOTE — ADDENDUM
[FreeTextEntry1] : Documented by Rubén Alvarenga acting as a scribe for Dr. Brant Marin on 08/16/2023.   All medical record entries made by the Scribe were at my, Dr. Brant Marin's, direction and personally dictated by me on 08/16/2023. I have reviewed the chart and agree that the record accurately reflects my personal performance of the history, physical exam, assessment and plan. I have also personally directed, reviewed, and agree with the discharge instructions.

## 2023-08-16 NOTE — PHYSICAL EXAM
[No Acute Distress] : no acute distress [Normal Oropharynx] : normal oropharynx [III] : Mallampati Class: III [Normal Appearance] : normal appearance [No Neck Mass] : no neck mass [Normal Rate/Rhythm] : normal rate/rhythm [Normal S1, S2] : normal s1, s2 [No Murmurs] : no murmurs [No Resp Distress] : no resp distress [Clear to Auscultation Bilaterally] : clear to auscultation bilaterally [No Abnormalities] : no abnormalities [Benign] : benign [Normal Gait] : normal gait [No Cyanosis] : no cyanosis [No Clubbing] : no clubbing [No Edema] : no edema [FROM] : FROM [Normal Color/ Pigmentation] : normal color/ pigmentation [No Focal Deficits] : no focal deficits [Oriented x3] : oriented x3 [Normal Affect] : normal affect [TextBox_68] : I:E ratio 1:3; clear

## 2023-08-28 ENCOUNTER — RX RENEWAL (OUTPATIENT)
Age: 28
End: 2023-08-28

## 2023-08-31 ENCOUNTER — TRANSCRIPTION ENCOUNTER (OUTPATIENT)
Age: 28
End: 2023-08-31

## 2023-09-25 ENCOUNTER — TRANSCRIPTION ENCOUNTER (OUTPATIENT)
Age: 28
End: 2023-09-25

## 2023-09-26 ENCOUNTER — RX RENEWAL (OUTPATIENT)
Age: 28
End: 2023-09-26

## 2023-10-09 ENCOUNTER — APPOINTMENT (OUTPATIENT)
Dept: ENDOCRINOLOGY | Facility: CLINIC | Age: 28
End: 2023-10-09
Payer: COMMERCIAL

## 2023-10-09 VITALS
WEIGHT: 224.5 LBS | RESPIRATION RATE: 16 BRPM | OXYGEN SATURATION: 97 % | SYSTOLIC BLOOD PRESSURE: 135 MMHG | BODY MASS INDEX: 32.14 KG/M2 | HEART RATE: 99 BPM | DIASTOLIC BLOOD PRESSURE: 82 MMHG | TEMPERATURE: 98.4 F | HEIGHT: 70 IN

## 2023-10-09 DIAGNOSIS — D35.2 BENIGN NEOPLASM OF PITUITARY GLAND: ICD-10-CM

## 2023-10-09 DIAGNOSIS — E03.9 HYPOTHYROIDISM, UNSPECIFIED: ICD-10-CM

## 2023-10-09 DIAGNOSIS — E66.9 OBESITY, UNSPECIFIED: ICD-10-CM

## 2023-10-09 DIAGNOSIS — R79.89 OTHER SPECIFIED ABNORMAL FINDINGS OF BLOOD CHEMISTRY: ICD-10-CM

## 2023-10-09 PROCEDURE — 99214 OFFICE O/P EST MOD 30 MIN: CPT | Mod: 25

## 2023-10-09 RX ORDER — TESTOSTERONE ENANTHATE 75 MG/.5ML
75 INJECTION SUBCUTANEOUS
Qty: 3 | Refills: 0 | Status: DISCONTINUED | COMMUNITY
Start: 2022-04-26 | End: 2023-10-09

## 2023-10-09 RX ORDER — TIRZEPATIDE 5 MG/.5ML
5 INJECTION, SOLUTION SUBCUTANEOUS
Qty: 3 | Refills: 1 | Status: DISCONTINUED | COMMUNITY
Start: 2022-08-17 | End: 2023-10-09

## 2023-10-09 RX ORDER — NAPROXEN 500 MG/1
500 TABLET ORAL TWICE DAILY
Qty: 60 | Refills: 0 | Status: DISCONTINUED | COMMUNITY
Start: 2023-02-16 | End: 2023-10-09

## 2023-10-09 RX ORDER — ERGOCALCIFEROL 1.25 MG/1
1.25 MG CAPSULE ORAL
Qty: 13 | Refills: 2 | Status: DISCONTINUED | COMMUNITY
Start: 2022-01-02 | End: 2023-10-09

## 2023-10-09 RX ORDER — PRAMIPEXOLE DIHYDROCHLORIDE 0.5 MG/1
0.5 TABLET ORAL
Qty: 90 | Refills: 1 | Status: DISCONTINUED | COMMUNITY
Start: 2020-03-04 | End: 2023-10-09

## 2023-10-09 RX ORDER — CARBOXYMETHYLCELLULOSE/CITRIC 0.75 G
CAPSULE ORAL
Qty: 180 | Refills: 5 | Status: DISCONTINUED | COMMUNITY
Start: 2022-03-16 | End: 2023-10-09

## 2023-10-14 ENCOUNTER — RX RENEWAL (OUTPATIENT)
Age: 28
End: 2023-10-14

## 2023-10-19 ENCOUNTER — TRANSCRIPTION ENCOUNTER (OUTPATIENT)
Age: 28
End: 2023-10-19

## 2023-10-26 ENCOUNTER — RX RENEWAL (OUTPATIENT)
Age: 28
End: 2023-10-26

## 2023-10-26 RX ORDER — LEVOTHYROXINE SODIUM 0.05 MG/1
50 TABLET ORAL
Qty: 90 | Refills: 1 | Status: ACTIVE | COMMUNITY
Start: 2018-11-07 | End: 1900-01-01

## 2023-12-08 NOTE — PATIENT PROFILE ADULT - FUNCTIONAL ASSESSMENT - BASIC MOBILITY SCORE.
Cough and congestion for three days. COVID and Flu negative in office today. Continue symptom directed treatment, will add Tessalon TID prn. Supportive care otherwise. Follow up in one week if not improved.
24

## 2023-12-14 ENCOUNTER — RX RENEWAL (OUTPATIENT)
Age: 28
End: 2023-12-14

## 2023-12-14 ENCOUNTER — APPOINTMENT (OUTPATIENT)
Dept: ORTHOPEDIC SURGERY | Facility: CLINIC | Age: 28
End: 2023-12-14
Payer: COMMERCIAL

## 2023-12-14 DIAGNOSIS — M23.92 UNSPECIFIED INTERNAL DERANGEMENT OF LEFT KNEE: ICD-10-CM

## 2023-12-14 PROCEDURE — 99214 OFFICE O/P EST MOD 30 MIN: CPT

## 2023-12-14 PROCEDURE — 73564 X-RAY EXAM KNEE 4 OR MORE: CPT | Mod: LT

## 2023-12-14 RX ORDER — NAPROXEN 500 MG/1
500 TABLET ORAL TWICE DAILY
Qty: 60 | Refills: 0 | Status: ACTIVE | COMMUNITY
Start: 2023-12-14 | End: 1900-01-01

## 2023-12-14 NOTE — HISTORY OF PRESENT ILLNESS
[de-identified] : 28 year old male  ([facility] leg budget analysis, kickball, dodgeball)  left knee pain since 2/12/23 was running to base in kicklball and felt something pull and pop The pain is located  anterior, lateral and by his quad tendon The pain is associated with  swelling and buckling Worse with activity and better at rest. Has tried ice, ibuprofen, massage gun    2/20/23 - used brace, mod activity, had mri  12/14/23 - reinjured on 12/11/23 trying to  ping pong ball while playing [place] and knee twisted and locked on him. Lateral and deep pain

## 2023-12-14 NOTE — ASSESSMENT
[FreeTextEntry1] :  mri left knee 2/17/23 - quad muscle strain and quad tendinitis, eff, chondral defect LFC 5mm, contusion LFC with edema   exacb of chondral defect with poss LB or LMT as well   - The patient was advised of the diagnosis. The natural history of the pathology was explained to the patient in layman's terms. Several different treatment options were discussed and explained including the risks and benefits of both surgical and non-surgical treatments. All questions and concerns were answered. - We will continue conservative treatment with PT, icing, and anti-inflammatory medications. - The patient was provided with a prescription for Physical Therapy. - naprosyn rx - Patient was given a prescription for an anti-inflammatory medication.  They will take it for the next week and then on an as needed basis, as long as there are no medical contra-indications.  Patient is counseled on possible GI, renal, and cardiovascular side effects. - updated mri to eval for LMT or chondral defect progereesion, if cont symptoms and just chondtal we discussed surgery for L chondro, poss microfx, poss biopsy .

## 2023-12-14 NOTE — IMAGING
[de-identified] : LEFT KNEE Inspection:  mod effusion Palpation: lateral femoral condyle tenderness and lasteral joint line Knee Range of Motion:  0-130  Strength: 5/5 Quadriceps strength, 5/5 Hamstring strength Neurological: light touch is intact throughout Ligament Stability and Special Tests:  McMurrays: pos Lachman: neg Pivot Shift: neg Posterior Drawer: neg Valgus: neg Varus: neg Patella Apprehension: neg Patella Maltracking: neg

## 2023-12-15 ENCOUNTER — APPOINTMENT (OUTPATIENT)
Dept: MRI IMAGING | Facility: CLINIC | Age: 28
End: 2023-12-15

## 2023-12-16 ENCOUNTER — RX RENEWAL (OUTPATIENT)
Age: 28
End: 2023-12-16

## 2023-12-16 ENCOUNTER — APPOINTMENT (OUTPATIENT)
Dept: MRI IMAGING | Facility: CLINIC | Age: 28
End: 2023-12-16
Payer: COMMERCIAL

## 2023-12-16 PROCEDURE — 73721 MRI JNT OF LWR EXTRE W/O DYE: CPT | Mod: LT

## 2023-12-21 ENCOUNTER — APPOINTMENT (OUTPATIENT)
Dept: ORTHOPEDIC SURGERY | Facility: CLINIC | Age: 28
End: 2023-12-21
Payer: COMMERCIAL

## 2023-12-21 DIAGNOSIS — M23.8X2 OTHER INTERNAL DERANGEMENTS OF LEFT KNEE: ICD-10-CM

## 2023-12-21 DIAGNOSIS — S83.412A SPRAIN OF MEDIAL COLLATERAL LIGAMENT OF LEFT KNEE, INITIAL ENCOUNTER: ICD-10-CM

## 2023-12-21 DIAGNOSIS — M65.9 SYNOVITIS AND TENOSYNOVITIS, UNSPECIFIED: ICD-10-CM

## 2023-12-21 PROCEDURE — 99214 OFFICE O/P EST MOD 30 MIN: CPT

## 2023-12-21 NOTE — HISTORY OF PRESENT ILLNESS
[de-identified] : 28 year old male  ([facility] leg budget analysis, kickball, dodgeball)  left knee pain since 2/12/23 was running to base in kicklball and felt something pull and pop The pain is located  anterior, lateral and by his quad tendon The pain is associated with  swelling and buckling Worse with activity and better at rest. Has tried ice, ibuprofen, massage gun    2/20/23 - used brace, mod activity, had mri  12/14/23 -had gotten better with PT/HEP until reinjured on 12/11/23 trying to  ping pong ball while playing [employer] and knee twisted and locked on him.  12/21/23 - had mri, been icing and mod [id number], cont symptoms

## 2023-12-21 NOTE — IMAGING
[de-identified] : LEFT KNEE Inspection:  mod effusion Palpation: lateral femoral condyle tenderness  Knee Range of Motion:  0-130  Strength: 5/5 Quadriceps strength, 5/5 Hamstring strength Neurological: light touch is intact throughout Ligament Stability and Special Tests:  McMurrays: pos Lachman: neg Pivot Shift: neg Posterior Drawer: neg Valgus: neg Varus: neg Patella Apprehension: neg Patella Maltracking: neg

## 2023-12-21 NOTE — ASSESSMENT
[FreeTextEntry1] : mri left knee 2/17/23 - quad muscle strain and quad tendinitis, eff, chondral defect LFC 5mm, contusion LFC with edema mri left knee 12/16/23 - mcl sprain, synov, eff, chondral defect LFC 5mm   - The patient was advised of the diagnosis. The natural history of the pathology was explained to the patient in layman's terms. Several different treatment options were discussed and explained including the risks and benefits of both surgical and non-surgical treatments. All questions and concerns were answered. - We will continue conservative treatment with PT, icing, and anti-inflammatory medications. - The patient was provided with a prescription for Physical Therapy. - naprosyn rx - Patient was given a prescription for an anti-inflammatory medication.  They will take it for the next week and then on an as needed basis, as long as there are no medical contra-indications.  Patient is counseled on possible GI, renal, and cardiovascular side effects. - fu 6 week re-eval, if cont symptosm we discussed surgery for L chondro, poss microfx, poss biopsy .

## 2023-12-28 ENCOUNTER — APPOINTMENT (OUTPATIENT)
Dept: PULMONOLOGY | Facility: CLINIC | Age: 28
End: 2023-12-28

## 2024-01-09 ENCOUNTER — TRANSCRIPTION ENCOUNTER (OUTPATIENT)
Age: 29
End: 2024-01-09

## 2024-01-09 ENCOUNTER — APPOINTMENT (OUTPATIENT)
Dept: ENDOCRINOLOGY | Facility: CLINIC | Age: 29
End: 2024-01-09

## 2024-01-15 ENCOUNTER — RX RENEWAL (OUTPATIENT)
Age: 29
End: 2024-01-15

## 2024-01-16 ENCOUNTER — RX RENEWAL (OUTPATIENT)
Age: 29
End: 2024-01-16

## 2024-02-14 ENCOUNTER — APPOINTMENT (OUTPATIENT)
Dept: PULMONOLOGY | Facility: CLINIC | Age: 29
End: 2024-02-14
Payer: COMMERCIAL

## 2024-02-14 ENCOUNTER — RX RENEWAL (OUTPATIENT)
Age: 29
End: 2024-02-14

## 2024-02-14 VITALS
BODY MASS INDEX: 32.93 KG/M2 | DIASTOLIC BLOOD PRESSURE: 78 MMHG | RESPIRATION RATE: 16 BRPM | SYSTOLIC BLOOD PRESSURE: 136 MMHG | WEIGHT: 230 LBS | HEIGHT: 70 IN | TEMPERATURE: 97.3 F | OXYGEN SATURATION: 98 % | HEART RATE: 89 BPM

## 2024-02-14 DIAGNOSIS — Z72.820 SLEEP DEPRIVATION: ICD-10-CM

## 2024-02-14 PROCEDURE — 95012 NITRIC OXIDE EXP GAS DETER: CPT

## 2024-02-14 PROCEDURE — 94010 BREATHING CAPACITY TEST: CPT

## 2024-02-14 PROCEDURE — 99214 OFFICE O/P EST MOD 30 MIN: CPT | Mod: 25

## 2024-02-14 RX ORDER — MONTELUKAST 10 MG/1
10 TABLET, FILM COATED ORAL
Qty: 90 | Refills: 1 | Status: ACTIVE | COMMUNITY
Start: 2019-01-03 | End: 1900-01-01

## 2024-02-14 RX ORDER — FLUTICASONE FUROATE AND VILANTEROL TRIFENATATE 200; 25 UG/1; UG/1
200-25 POWDER RESPIRATORY (INHALATION)
Qty: 180 | Refills: 1 | Status: ACTIVE | COMMUNITY
Start: 2018-11-06 | End: 1900-01-01

## 2024-02-14 RX ORDER — ALBUTEROL SULFATE 90 UG/1
108 (90 BASE) INHALANT RESPIRATORY (INHALATION)
Qty: 20.1 | Refills: 5 | Status: ACTIVE | COMMUNITY
Start: 2021-03-12 | End: 1900-01-01

## 2024-02-14 RX ORDER — TIOTROPIUM BROMIDE INHALATION SPRAY 3.12 UG/1
2.5 SPRAY, METERED RESPIRATORY (INHALATION) DAILY
Qty: 12 | Refills: 0 | Status: ACTIVE | COMMUNITY
Start: 2023-05-24 | End: 1900-01-01

## 2024-02-14 NOTE — HISTORY OF PRESENT ILLNESS
[FreeTextEntry1] : Mr. Mathur is a 28 year old male with a history of abnormal PFTs, GERD, low testosterone, STEPHANIE on CPAP, overweight, poor sleep, RLS, and SOB presenting to the office today for a follow up visit. His chief complaint is  - he notes he is feeling great - he notes being down 125 pounds over his weight loss journey - he notes he had a fracture in his humerus and had surgery - he notes eating well - he notes sleeping well - he notes trying to not use his CPAP and he mentioned he was not snoring - vision is stable - he notes work is going well  - he notes his reflux is extremely improved  - he notes he has a DEXA scan scheduled on Sunday - he notes his goal weight is 190-195 lbs - he notes he has been off Mounjaro since May - he notes taking Armadaphenol  -he denies any headaches, nausea, emesis, fever, chills, sweats, chest pain, chest pressure, coughing, wheezing, palpitations, constipation, diarrhea, vertigo, dysphagia, heartburn, reflux, itchy eyes, itchy ears, leg swelling, leg pain, arthralgias, myalgias, or sour taste in the mouth.  -TODAY'S VISIT 2/14/24 -patient reports he is in his normal state of health, no new complaints or recent illness -reports his weight has been stable -reports he is off Mounjaro  -reports since the weight loss he has not been using his CPAP  -he denies any headaches, nausea, emesis, fever, chills, sweats, chest pain, chest pressure, coughing, wheezing, palpitations, constipation, diarrhea, vertigo, dysphagia, heartburn, reflux, itchy eyes, itchy ears, leg swelling, leg pain, arthralgias, myalgias, or sour taste in the mouth.

## 2024-02-14 NOTE — ASSESSMENT
[FreeTextEntry1] : Mr. Mathur is a 29 year old male with a history of SOB, GERD, poor sleep, Tourette's syndrome, asthma, allergy, ADD, sleep apnea, ?RLS, insomnia, obesity, OCD, and anxiety presenting to the office for a follow up pulmonary re-evaluation - +food allergies. Dx of hypogonadism, hypothyroidism, pituitary tumor, allergies- improved - s/p covid asthma 11/2022. Here today for pulmonary follow up he is stable at this time   problem 1: STEPHANIE- ?still present - HSS repeat  -Stop using CPAP since he lost the weight -Settings: Dreamstation APAP CPAP mode of 10 cm H2O with FFM ([facility]) -s/p off Modafinil 200 mg QAM due to "crashing"   -add Nuvigil 250 mg QAM (ISTOP) -Sleep apnea is associated with adverse clinical consequences which an affect most organ systems.  Cardiovascular disease risk includes arrhythmias, atrial fibrillation, hypertension, coronary artery disease, and stroke. Metabolic disorders include diabetes type 2, non-alcoholic fatty liver disease. Mood disorder especially depression; and cognitive decline especially in the elderly. Associations with  chronic reflux/Leahy's esophagus some but not all inclusive.  -Reasons  include arousal consistent with hypopnea; respiratory events most prominent in REM sleep or supine position; therefore sleep staging and body position are important for accurate diagnosis and estimation of AHI.   Problem 1A: Suspected COVID-19 infection (not found) -s/p COVID-19 vaccine x3  -s/p Alkaseltzer cold and flu  -s/p quarantine for 10 days  COVID-19 precautionary Immune Support Recommendations: -OTC Vitamin C 500mg BID  -OTC Quercetin 250-500mg BID  -OTC Zinc 75-100mg per day  -OTC Melatonin 1 or 2mg a night  -OTC Vitamin D 1-4000mg per day  -OTC Tonic Water 8oz per day -Water 0.5-1 gallon per day Asthma and COVID19: You need to make sure your asthma is under control. This often requires the use of inhaled corticosteroids (and sometimes oral corticosteroids). Inhaled corticosteroids do not likely reduce your immune system's ability to fight infections, but oral corticosteroids may. It is important to use the steps above to protect yourself to limit your exposure to any respiratory virus.   problem 2: insomnia/poor sleep (improved) -Recommended to use Insomnitol (take 1st) / Requip .5 mg -recommended to use Sleep Guard (2nd, if he wakes up) -Recommended Perrigo OTC  -recommended to use sunglasses 30 minutes before bedtime and to try room darkening window shades -Good sleep hygiene was encouraged including avoiding watching television an hour before bed, keeping caffeine at a low,  avoiding reading, television, or anything, in bed, no drinking any liquids three hours before bedtime, and only getting into bed when tired and ready for sleep.   problem 3: RLS - He is s/p blood work, which revealed: ferritin level (normal), iron binding level (normal), testosterone level (low), TFT (normal) and vitamin D level (normal) - continue  Mirapex 0.5mg QHS -Restless Legs Syndrome (RLS), also known as Stewart-Ekbom Disease, is a common sleep -related movement disorder. About 1 in 10 adults in the [place] have problems from restless leg syndrome. It also can be seen in about 2% of children. Women are twice as likely as men to have RLS. People with RLS will have symptoms  most often during times when they are less active, especially at bedtime. RLS most often causes an overwhelming urge to move your legs and sometimes other parts of your body. This urge is associated with unpleasant sensations in different parts of th body. The symptoms can be mild to severe and can affect your ability to go to sleep and stay asleep. People with RLS often sleep less at night and feel more tired during the day.   problem 4: abnormal PFTs-  c/w Asthma  -MCT c/w asthma -continue Breo 200 1 inhalation QD or Symbicort 160 2 inhalations BID -renewed -continue Ventolin 2 puffs Q6H - renewed -continue Singulair 10 mg QHS - renewed  problem 5: allergy sinus  -continue Olopatadine 0.6% 1 sniff BID  - continue Clarinex 5 mg QAM Environmental measures for allergies were encouraged including mattress and pillow cover, air purifier, and environmental controls   problem 6: residual fatigue -s/p Modafinil 200 mg QAM (5AM) - I-STOP reviewed due to "crashing"  -continue Nuvigil 250 mg QAM (ISTOP) -add Boron 6 mg QD  -endocrinology results f/u   Problem 7: GERD - Continue Prilosec 20 mg QAM before breakfast -continue Pepcid 40mg QHS  - [place] Feldman - c/w mild GERD/HH   Things to avoid including overeating, spicy foods, tight clothing, eating within three hours of bed, this list is not all inclusive.  -Rule of 2s: avoid eating too much, eating too late, eating too spicy, eating two hours before bed -For treatment of reflux, possible options discussed including diet control, H2 blockers, PPIs, as well as coating motility agents discussed as treatment options. Timing of meals and proximity of last meal to sleep were discussed. If symptoms persist, a formal gastrointestinal evaluation is needed.   problem 8: low testosterone (off Rx) -recommended supplemental [place] 6mg QD - restart 4/2023 -Off testosterone Rx ([facility]belev) -Referred to endocrinologist for further evaluation (Abelev) - Referred to urologist (Dave Hoenig)  problem 9: poor breathing mechanics -Recommended Wikyle Hof and Bute[place]ko breathing techniques  -Proper breathing techniques were reviewed with an emphasis of exhalation. Patient instructed to breath in for 1 second and out for four seconds. Patient was encouraged to not talk while walking.   problem 10: obesity/out of shape - off Mounjaro as of 6/2023  -recommended to increase protein and decrease carbohydrates -recommended to reduce caffeine and increase water intake -Weight loss, exercise, and diet control were discussed and are highly encouraged. Treatment options were given such as, aqua therapy, and contacting a nutritionist. Recommended to use the elliptical, stationary bike, refrain from use of treadmill. Mindful eating was explained to the patient. Obesity is associated with worsening asthma, shortness of breath, and potential for cardiac disease, diabetes, and other underlying medical conditions.  Problem 11a: COVID-19 precautionary Immune Support Recommendations: -s/p COVID-19 11/2022 -OTC Vitamin C 500mg BID  -OTC Quercetin 250-500mg BID  -OTC Zinc 75-100mg per day  -OTC Melatonin 1 or 2mg a night  -OTC Vitamin D 1-4000mg per day  -OTC Tonic Water 8oz per day -Water 0.5-1 gallon per day  problem 11: health maintenance- [facility] Stretches  -Nocturnal sweats - Recommend pharmacy consult regarding his medication -s/p Covid-19 vaccine Pfizer x3 -s/p Influenza vaccine 2023 -recommended strep pneumonia vaccines: Prevnar-13 vaccine, followed by Pneumo vaccine 23 one year following -recommended early intervention for URIs -recommended regular osteoporosis evaluations -recommended early dermatological evaluations -recommended after the age of 50 to the age of 70, colonoscopy every 5 years     Follow up in 6 months  Patient is encouraged to call with any changes, concerns or questions.

## 2024-02-14 NOTE — PHYSICAL EXAM
[No Acute Distress] : no acute distress [Normal Oropharynx] : normal oropharynx [III] : Mallampati Class: III [Normal Appearance] : normal appearance [Supple] : supple [No Neck Mass] : no neck mass [Normal Rate/Rhythm] : normal rate/rhythm [Normal S1, S2] : normal s1, s2 [No Murmurs] : no murmurs [No Rubs] : no rubs [No Gallops] : no gallops [No Resp Distress] : no resp distress [Clear to Auscultation Bilaterally] : clear to auscultation bilaterally [No Abnormalities] : no abnormalities [Benign] : benign [Not Tender] : not tender [Soft] : soft [Normal Bowel Sounds] : normal bowel sounds [Normal Gait] : normal gait [No Clubbing] : no clubbing [No Cyanosis] : no cyanosis [No Edema] : no edema [FROM] : FROM [Normal Color/ Pigmentation] : normal color/ pigmentation [No Focal Deficits] : no focal deficits [Oriented x3] : oriented x3 [Normal Affect] : normal affect [TextBox_68] : I:E ratio 1:3; clear

## 2024-02-14 NOTE — PROCEDURE
[FreeTextEntry1] : FENO was 17; a normal value being less than 25 Fractional exhaled nitric oxide (FENO) is regarded as a simple, noninvasive method for assessing eosinophilic airway inflammation. Produced by a variety of cells within the lung, nitric oxide (NO) concentrations are generally low in healthy individuals. However, high concentrations of NO appear to be involved in nonspecific host defense mechanisms and chronic inflammatory diseases such as asthma. The American Thoracic Society (ATS) therefore has recommended using FENO to aid in the diagnosis and monitoring of eosinophilic airway inflammation and asthma, and for identifying steroid responsive individuals whose chronic respiratory symptoms may be caused by airway inflammation.  SPI .3% predicted

## 2024-03-13 ENCOUNTER — APPOINTMENT (OUTPATIENT)
Dept: ORTHOPEDIC SURGERY | Facility: CLINIC | Age: 29
End: 2024-03-13

## 2024-04-09 ENCOUNTER — APPOINTMENT (OUTPATIENT)
Dept: ENDOCRINOLOGY | Facility: CLINIC | Age: 29
End: 2024-04-09

## 2024-04-19 ENCOUNTER — RX RENEWAL (OUTPATIENT)
Age: 29
End: 2024-04-19

## 2024-04-19 RX ORDER — FLUTICASONE FUROATE AND VILANTEROL 200; 25 UG/1; UG/1
200-25 POWDER RESPIRATORY (INHALATION) DAILY
Qty: 3 | Refills: 0 | Status: ACTIVE | COMMUNITY
Start: 2023-10-14 | End: 1900-01-01

## 2024-04-22 ENCOUNTER — TRANSCRIPTION ENCOUNTER (OUTPATIENT)
Age: 29
End: 2024-04-22

## 2024-04-22 RX ORDER — AZELASTINE HYDROCHLORIDE 137 UG/1
0.1 SPRAY, METERED NASAL TWICE DAILY
Qty: 3 | Refills: 1 | Status: ACTIVE | COMMUNITY
Start: 2024-04-22 | End: 1900-01-01

## 2024-04-22 RX ORDER — ARMODAFINIL 250 MG/1
250 TABLET ORAL DAILY
Qty: 90 | Refills: 1 | Status: ACTIVE | COMMUNITY
Start: 2023-04-20 | End: 1900-01-01

## 2024-04-22 RX ORDER — DESLORATADINE 5 MG/1
5 TABLET ORAL
Qty: 90 | Refills: 1 | Status: ACTIVE | COMMUNITY
Start: 2020-06-09 | End: 1900-01-01

## 2024-05-08 ENCOUNTER — APPOINTMENT (OUTPATIENT)
Dept: PULMONOLOGY | Facility: CLINIC | Age: 29
End: 2024-05-08
Payer: COMMERCIAL

## 2024-05-08 VITALS
WEIGHT: 240 LBS | RESPIRATION RATE: 16 BRPM | SYSTOLIC BLOOD PRESSURE: 132 MMHG | OXYGEN SATURATION: 98 % | BODY MASS INDEX: 34.36 KG/M2 | DIASTOLIC BLOOD PRESSURE: 80 MMHG | HEART RATE: 100 BPM | TEMPERATURE: 97.1 F | HEIGHT: 70 IN

## 2024-05-08 DIAGNOSIS — J30.9 ALLERGIC RHINITIS, UNSPECIFIED: ICD-10-CM

## 2024-05-08 PROCEDURE — 99214 OFFICE O/P EST MOD 30 MIN: CPT | Mod: 25

## 2024-05-08 RX ORDER — AMOXICILLIN AND CLAVULANATE POTASSIUM 875; 125 MG/1; MG/1
875-125 TABLET, COATED ORAL
Qty: 20 | Refills: 0 | Status: ACTIVE | COMMUNITY
Start: 2024-05-08 | End: 1900-01-01

## 2024-05-08 NOTE — REASON FOR VISIT
[Asthma] : asthma [Sleep Apnea] : sleep apnea [Acute] : an acute visit [TextBox_44] : ASA WILLETT is being seen for an acute visit today for cough, asthma, SOB

## 2024-05-08 NOTE — HISTORY OF PRESENT ILLNESS
[TextBox_4] : Mr. Mathur is a 28 year old male with a history of abnormal PFTs, GERD, low testosterone, STEPHANIE on CPAP, overweight, poor sleep, RLS, and SOB presenting to the office today for a follow up visit. His chief complaint is  -patient reports he is in his normal state of health, no new complaints or recent illness -reports his weight has been stable -reports he is off Mounjaro -reports since the weight loss he has not been using his CPAP  -he denies any headaches, nausea, emesis, fever, chills, sweats, chest pain, chest pressure, coughing, wheezing, palpitations, constipation, diarrhea, vertigo, dysphagia, heartburn, reflux, itchy eyes, itchy ears, leg swelling, leg pain, arthralgias, myalgias, or sour taste in the mouth.  TODAY'S VISIT 5/8/24 Mr. Mathur is a 29-year-old male with a history of abnormal PFTs, GERD, low testosterone, STEPHANIE on CPAP, overweight, poor sleep, RLS, and SOB presenting to the office today for a follow up visit. His chief complaint is  -reports he has been sick for the past 3 weeks, -reports he went to urgent care he was coughing associated with SOB -reports he was tested for Flu and Covid - negative -reports CXR was negative -reports he was prescribed ZPack and Prednisone [id number], [id number], 10mgx3 days (finishing tomorrow) -reports he is still coughing with yellow sputum -reports he uses Dayquil and Nyquil with some relief -reports he has been compliant with his inhalers -reports he is still using Nuvigil  -reports he has gained back a few pounds working on losing the weight -reports GERD is controlled with diet -reports he is off PAP therapy since losing the weight  -he denies any headaches, nausea, emesis, fever, chills, sweats, chest pain, chest pressure, palpitations, constipation, diarrhea, vertigo, dysphagia, heartburn, reflux, itchy eyes, itchy ears, leg swelling, leg pain, arthralgias, myalgias, or sour taste in the mouth.

## 2024-05-08 NOTE — REVIEW OF SYSTEMS
[Negative] : Endocrine [Postnasal Drip] : postnasal drip [Cough] : cough [Sputum] : sputum [SOB on Exertion] : sob on exertion [GERD] : gerd

## 2024-05-08 NOTE — PHYSICAL EXAM
[No Acute Distress] : no acute distress [Normal Oropharynx] : normal oropharynx [III] : Mallampati Class: III [Normal Appearance] : normal appearance [Supple] : supple [No Neck Mass] : no neck mass [Normal Rate/Rhythm] : normal rate/rhythm [Normal S1, S2] : normal s1, s2 [No Murmurs] : no murmurs [No Rubs] : no rubs [No Gallops] : no gallops [No Resp Distress] : no resp distress [Clear to Auscultation Bilaterally] : clear to auscultation bilaterally [No Abnormalities] : no abnormalities [Benign] : benign [Not Tender] : not tender [Soft] : soft [Normal Bowel Sounds] : normal bowel sounds [Normal Gait] : normal gait [No Clubbing] : no clubbing [No Cyanosis] : no cyanosis [No Edema] : no edema [FROM] : FROM [Normal Color/ Pigmentation] : normal color/ pigmentation [No Focal Deficits] : no focal deficits [Oriented x3] : oriented x3 [Normal Affect] : normal affect [TextBox_2] : OW [TextBox_68] : I:E ratio 1:3; clear

## 2024-05-08 NOTE — ASSESSMENT
[FreeTextEntry1] : Mr. Mathur is a 29 year old male with a history of SOB, GERD, poor sleep, Tourette's syndrome, asthma, allergy, ADD, sleep apnea, RLS, insomnia, obesity, OCD, anxiety, hypogonadism, hypothyroidism, pituitary tumor. Here today for pulmonary follow up he is stable at this time   problem 1: STEPHANIE- still present - HSS repeat  -Stop using CPAP since he lost the weight -Settings: Dreamstation APAP CPAP mode of 10 cm H2O with FFM ([employer]) -s/p off Modafinil 200 mg QAM due to "crashing"   -add Nuvigil 250 mg QAM (ISTOP) -Sleep apnea is associated with adverse clinical consequences which an affect most organ systems.  Cardiovascular disease risk includes arrhythmias, atrial fibrillation, hypertension, coronary artery disease, and stroke. Metabolic disorders include diabetes type 2, non-alcoholic fatty liver disease. Mood disorder especially depression; and cognitive decline especially in the elderly. Associations with  chronic reflux/Leahy's esophagus some but not all inclusive.  -Reasons  include arousal consistent with hypopnea; respiratory events most prominent in REM sleep or supine position; therefore sleep staging and body position are important for accurate diagnosis and estimation of AHI.   problem 2: insomnia/poor sleep (improved) -Recommended to use Insomnitol (take 1st) / Requip .5 mg - not using -recommended to use Sleep Guard (2nd, if he wakes up) - not using -Recommended Perrigo OTC - not using -recommended to use sunglasses 30 minutes before bedtime and to try room darkening window shades -Good sleep hygiene was encouraged including avoiding watching television an hour before bed, keeping caffeine at a low,  avoiding reading, television, or anything, in bed, no drinking any liquids three hours before bedtime, and only getting into bed when tired and ready for sleep.   problem 3: RLS - He is s/p blood work, which revealed: ferritin level (normal), iron binding level (normal), testosterone level (low), TFT (normal) and vitamin D level (normal) - continue  Mirapex 0.5mg QHS - not using -Restless Legs Syndrome (RLS), also known as Stewart-Ekbom Disease, is a common sleep -related movement disorder. About 1 in 10 adults in the [place] have problems from restless leg syndrome. It also can be seen in about 2% of children. Women are twice as likely as men to have RLS. People with RLS will have symptoms  most often during times when they are less active, especially at bedtime. RLS most often causes an overwhelming urge to move your legs and sometimes other parts of your body. This urge is associated with unpleasant sensations in different parts of th body. The symptoms can be mild to severe and can affect your ability to go to sleep and stay asleep. People with RLS often sleep less at night and feel more tired during the day.   problem 4: abnormal PFTs-  c/w Asthma  -MCT c/w asthma -continue Breo 200 1 inhalation QD or Symbicort 160 2 inhalations BID -renewed -continue Ventolin 2 puffs Q6H - renewed -continue Singulair 10 mg QHS - renewed  problem 5: allergy sinus  -continue Olopatadine 0.6% 1 sniff BID switched to Azelastine  - continue Clarinex 5 mg QAM Environmental measures for allergies were encouraged including mattress and pillow cover, air purifier, and environmental controls   problem 6: residual fatigue -s/p Modafinil 200 mg QAM (5AM) - I-STOP reviewed due to "crashing"  -continue Nuvigil 250 mg QAM (ISTOP) -continue Boron 6 mg QD  -endocrinology results f/u   Problem 7: GERD (stable controlled with diet) not using meds - Continue Prilosec 20 mg QAM before breakfast -continue Pepcid 40mg QHS  - Garryowen Feldman - c/w mild GERD/HH  Things to avoid including overeating, spicy foods, tight clothing, eating within three hours of bed, this list is not all inclusive.  -Rule of 2s: avoid eating too much, eating too late, eating too spicy, eating two hours before bed -For treatment of reflux, possible options discussed including diet control, H2 blockers, PPIs, as well as coating motility agents discussed as treatment options. Timing of meals and proximity of last meal to sleep were discussed. If symptoms persist, a formal gastrointestinal evaluation is needed.   problem 8: low testosterone (off Rx) -recommended supplemental [place] 6mg QD - restart 4/2023 -Off testosterone Rx (Abelev) -Referred to endocrinologist for further evaluation (Abelev) - Referred to urologist (Galen Hoenig)  problem 9: obesity/out of shape - off Mounjaro as of 6/2023  -recommended to increase protein and decrease carbohydrates -recommended to reduce caffeine and increase water intake -Weight loss, exercise, and diet control were discussed and are highly encouraged. Treatment options were given such as, aqua therapy, and contacting a nutritionist. Recommended to use the elliptical, stationary bike, refrain from use of treadmill. Mindful eating was explained to the patient. Obesity is associated with worsening asthma, shortness of breath, and potential for cardiac disease, diabetes, and other underlying medical conditions.   Follow up in 6 months  Patient is encouraged to call with any changes, concerns or questions.

## 2024-05-09 LAB
RAPID RVP RESULT: DETECTED
RV+EV RNA SPEC QL NAA+PROBE: DETECTED
SARS-COV-2 RNA PNL RESP NAA+PROBE: NOT DETECTED

## 2024-05-09 RX ORDER — PROMETHAZINE HYDROCHLORIDE AND DEXTROMETHORPHAN HYDROBROMIDE ORAL SOLUTION 15; 6.25 MG/5ML; MG/5ML
6.25-15 SOLUTION ORAL EVERY 6 HOURS
Qty: 1 | Refills: 0 | Status: ACTIVE | COMMUNITY
Start: 2024-05-09 | End: 1900-01-01

## 2024-05-10 ENCOUNTER — EMERGENCY (EMERGENCY)
Facility: HOSPITAL | Age: 29
LOS: 1 days | Discharge: HOME | End: 2024-05-10
Attending: EMERGENCY MEDICINE | Admitting: STUDENT IN AN ORGANIZED HEALTH CARE EDUCATION/TRAINING PROGRAM
Payer: COMMERCIAL

## 2024-05-10 VITALS
DIASTOLIC BLOOD PRESSURE: 66 MMHG | HEART RATE: 95 BPM | OXYGEN SATURATION: 97 % | RESPIRATION RATE: 18 BRPM | TEMPERATURE: 98 F | SYSTOLIC BLOOD PRESSURE: 145 MMHG

## 2024-05-10 DIAGNOSIS — Z98.890 OTHER SPECIFIED POSTPROCEDURAL STATES: Chronic | ICD-10-CM

## 2024-05-10 LAB
BASOPHILS # BLD AUTO: 0.03 K/UL — SIGNIFICANT CHANGE UP (ref 0–0.2)
BASOPHILS NFR BLD AUTO: 0.2 % — SIGNIFICANT CHANGE UP (ref 0–2)
D DIMER BLD IA.RAPID-MCNC: <150 NG/ML DDU — SIGNIFICANT CHANGE UP
EOSINOPHIL # BLD AUTO: 0.17 K/UL — SIGNIFICANT CHANGE UP (ref 0–0.5)
EOSINOPHIL NFR BLD AUTO: 1.3 % — SIGNIFICANT CHANGE UP (ref 0–6)
HCT VFR BLD CALC: 45.8 % — SIGNIFICANT CHANGE UP (ref 39–50)
HGB BLD-MCNC: 15 G/DL — SIGNIFICANT CHANGE UP (ref 13–17)
IANC: 9.27 K/UL — HIGH (ref 1.8–7.4)
IMM GRANULOCYTES NFR BLD AUTO: 0.4 % — SIGNIFICANT CHANGE UP (ref 0–0.9)
LYMPHOCYTES # BLD AUTO: 18.5 % — SIGNIFICANT CHANGE UP (ref 13–44)
LYMPHOCYTES # BLD AUTO: 2.38 K/UL — SIGNIFICANT CHANGE UP (ref 1–3.3)
MCHC RBC-ENTMCNC: 29.9 PG — SIGNIFICANT CHANGE UP (ref 27–34)
MCHC RBC-ENTMCNC: 32.8 GM/DL — SIGNIFICANT CHANGE UP (ref 32–36)
MCV RBC AUTO: 91.4 FL — SIGNIFICANT CHANGE UP (ref 80–100)
MONOCYTES # BLD AUTO: 0.97 K/UL — HIGH (ref 0–0.9)
MONOCYTES NFR BLD AUTO: 7.5 % — SIGNIFICANT CHANGE UP (ref 2–14)
NEUTROPHILS # BLD AUTO: 9.27 K/UL — HIGH (ref 1.8–7.4)
NEUTROPHILS NFR BLD AUTO: 72.1 % — SIGNIFICANT CHANGE UP (ref 43–77)
NRBC # BLD: 0 /100 WBCS — SIGNIFICANT CHANGE UP (ref 0–0)
NRBC # FLD: 0 K/UL — SIGNIFICANT CHANGE UP (ref 0–0)
PLATELET # BLD AUTO: 244 K/UL — SIGNIFICANT CHANGE UP (ref 150–400)
RBC # BLD: 5.01 M/UL — SIGNIFICANT CHANGE UP (ref 4.2–5.8)
RBC # FLD: 12.7 % — SIGNIFICANT CHANGE UP (ref 10.3–14.5)
WBC # BLD: 12.87 K/UL — HIGH (ref 3.8–10.5)
WBC # FLD AUTO: 12.87 K/UL — HIGH (ref 3.8–10.5)

## 2024-05-10 PROCEDURE — 71046 X-RAY EXAM CHEST 2 VIEWS: CPT | Mod: 26

## 2024-05-10 PROCEDURE — 99285 EMERGENCY DEPT VISIT HI MDM: CPT

## 2024-05-10 PROCEDURE — 93010 ELECTROCARDIOGRAM REPORT: CPT | Mod: 76

## 2024-05-10 RX ORDER — SODIUM CHLORIDE 9 MG/ML
1000 INJECTION INTRAMUSCULAR; INTRAVENOUS; SUBCUTANEOUS ONCE
Refills: 0 | Status: COMPLETED | OUTPATIENT
Start: 2024-05-10 | End: 2024-05-10

## 2024-05-10 RX ADMIN — SODIUM CHLORIDE 1000 MILLILITER(S): 9 INJECTION INTRAMUSCULAR; INTRAVENOUS; SUBCUTANEOUS at 23:29

## 2024-05-10 NOTE — ED ADULT NURSE NOTE - NSFALLHARMRISKINTERV_ED_ALL_ED

## 2024-05-10 NOTE — ED PROVIDER NOTE - CLINICAL SUMMARY MEDICAL DECISION MAKING FREE TEXT BOX
29-year-old male history of ADHD, STEPHANIE on CPAP at night, hypothyroidism, OCD brought in by EMS with wife at bedside chief complaint of syncope.  Wife states he has been sick for the last couple days.  Noticed that he has been "having coughing fits" and in the middle of coughing he will "become unresponsive for few seconds" and came straight back not confused.  No associated chest pain.  He has been having a nonproductive cough and subjective fever at home.  Was told to have bronchitis.  No nausea no vomiting.  Wife states he has had poor p.o. intake today but no abdominal pain no focal numbness or weakness.  Mom arrives concerned that he has significant family cardiac history.  He saw a cardiologist a year ago.  No recent workup.  No recent travel no hormone therapy denies smoking denies alcohol denies drug use.  Nontoxic male sitting up in bed ANO x 3 able to give a full history.     General: WN/WD NAD, large body habitus  Head: Atraumatic  Eyes: EOM grossly in tact, no scleral icterus  ENT: moist mucous membranes  Neurology: A&Ox3, nonfocal, MARSH x 4  Respiratory: normal respiratory effort, grossly CTAB no w/r/r  CV: Extremities warm and well perfused, RRR no m/r/g  Abdominal: Soft, non-distended, non-tender  Extremities: No edema  Skin: No rashes     Plan EKG, cardiac monitor, labs, chest x-ray, flu COVID, rectal temp, and likely CDU for telemetry admitted given strong family history of cardiac disease vs [facility] w/cardiology follow up. - Francisco Ham, PGY-3

## 2024-05-10 NOTE — ED PROVIDER NOTE - ATTENDING CONTRIBUTION TO CARE
Attending Statement: I have personally seen and examined this patient. I have fully participated in the care of this patient. I have reviewed all pertinent clinical information, including history physical exam, plan and the Resident's note and agree except as noted  29-year-old male history of ADHD, STEPHANIE on CPAP at night, hypothyroidism, OCD brought in by EMS with wife at bedside chief complaint of syncope.  Wife states he has been sick for the last couple days.  Noticed that he has been "having coughing fits" and in the middle of coughing he will "become unresponsive for few seconds" and came straight back not confused.  No associated chest pain.  He has been having a nonproductive cough and subjective fever at home.  Was told to have bronchitis.  No nausea no vomiting.  Wife states he has had poor p.o. intake today but no abdominal pain no focal numbness or weakness.  Mom arrives concerned that he has significant family cardiac history.  He saw a cardiologist a year ago.  No recent workup.  No recent travel no hormone therapy denies smoking denies alcohol denies drug use.  Nontoxic male sitting up in bed ANO x 3 able to give a full history.  Sinus on a cardiac monitor.No facial asymmetry.  No slurred speech.  Extraocular motor intact.  No pronator drift no dysmetria.  Normal sensation and strength throughout.  Normal gait.  No work of breathing appreciated nontender abdomen.No calf tenderness no leg swelling.  Plan EKG, cardiac monitor, labs, chest x-ray, flu COVID, rectal temp, and likely CDU for telemetry admitted given strong family history of cardiac disease

## 2024-05-10 NOTE — ED ADULT TRIAGE NOTE - CHIEF COMPLAINT QUOTE
Pt c/o 3 syncopal episodes within the last day, 2 within the last hour. Denies chest pain, sob, abd pain, n/v/d, fevers/chills. Denies Hx

## 2024-05-10 NOTE — ED PROVIDER NOTE - NSFOLLOWUPINSTRUCTIONS_ED_ALL_ED_FT
Syncope    Syncope is when you temporarily lose consciousness, also called fainting or passing out. It is caused by a sudden decrease in blood flow to the brain. Even though most causes of syncope are not dangerous, syncope can possibly be a sign of a serious medical problem. Signs that you may be about to faint include feeling dizzy, lightheaded, nausea, visual changes, or cold/clammy skin. Do not drive, operate heavy machinery, or play sports until your health care provider says it is okay.    Please follow up with your private cardiologist within 1 week for further management.     SEEK IMMEDIATE MEDICAL CARE IF YOU HAVE ANY OF THE FOLLOWING SYMPTOMS: severe headache, pain in your chest/abdomen/back, bleeding from your mouth or rectum, palpitations, shortness of breath, pain with breathing, seizure, confusion, or trouble walking.

## 2024-05-10 NOTE — ED ADULT NURSE NOTE - OBJECTIVE STATEMENT
Patient is awake and alert, A&O x 3, NAD, experienced 3 syncopal episodes since Yesterday. Yesterday, patient was using the bathroom and when he tried to stand up, he synopsized for a few seconds, returned to baseline within a few mins. Today, patient was lying on the bed and has 2 syncopal episodes while lying in bed. Per significant other, patient was unresponsive for a few seconds and couldn't remember what occurred. He is experiencing cold-like sx for a few weeks. No chest pain, SOB, dizziness or weakness. 18g IV right AC, labs drawn and sent, awaiting further orders from MD.

## 2024-05-11 VITALS
SYSTOLIC BLOOD PRESSURE: 115 MMHG | RESPIRATION RATE: 18 BRPM | DIASTOLIC BLOOD PRESSURE: 71 MMHG | TEMPERATURE: 99 F | OXYGEN SATURATION: 98 % | HEART RATE: 84 BPM

## 2024-05-11 LAB
ADD ON TEST-SPECIMEN IN LAB: SIGNIFICANT CHANGE UP
ALBUMIN SERPL ELPH-MCNC: 3.5 G/DL — SIGNIFICANT CHANGE UP (ref 3.3–5)
ALBUMIN SERPL ELPH-MCNC: 3.6 G/DL — SIGNIFICANT CHANGE UP (ref 3.3–5)
ALBUMIN SERPL ELPH-MCNC: 3.6 G/DL — SIGNIFICANT CHANGE UP (ref 3.3–5)
ALP SERPL-CCNC: 80 U/L — SIGNIFICANT CHANGE UP (ref 40–120)
ALP SERPL-CCNC: 81 U/L — SIGNIFICANT CHANGE UP (ref 40–120)
ALP SERPL-CCNC: 82 U/L — SIGNIFICANT CHANGE UP (ref 40–120)
ALT FLD-CCNC: 26 U/L — SIGNIFICANT CHANGE UP (ref 4–41)
ALT FLD-CCNC: SIGNIFICANT CHANGE UP U/L (ref 4–41)
ALT FLD-CCNC: SIGNIFICANT CHANGE UP U/L (ref 4–41)
ANION GAP SERPL CALC-SCNC: 11 MMOL/L — SIGNIFICANT CHANGE UP (ref 7–14)
ANION GAP SERPL CALC-SCNC: 12 MMOL/L — SIGNIFICANT CHANGE UP (ref 7–14)
ANION GAP SERPL CALC-SCNC: 14 MMOL/L — SIGNIFICANT CHANGE UP (ref 7–14)
AST SERPL-CCNC: 32 U/L — SIGNIFICANT CHANGE UP (ref 4–40)
AST SERPL-CCNC: SIGNIFICANT CHANGE UP U/L (ref 4–40)
AST SERPL-CCNC: SIGNIFICANT CHANGE UP U/L (ref 4–40)
BILIRUB SERPL-MCNC: 0.4 MG/DL — SIGNIFICANT CHANGE UP (ref 0.2–1.2)
BILIRUB SERPL-MCNC: 0.4 MG/DL — SIGNIFICANT CHANGE UP (ref 0.2–1.2)
BILIRUB SERPL-MCNC: 0.5 MG/DL — SIGNIFICANT CHANGE UP (ref 0.2–1.2)
BUN SERPL-MCNC: 12 MG/DL — SIGNIFICANT CHANGE UP (ref 7–23)
BUN SERPL-MCNC: 12 MG/DL — SIGNIFICANT CHANGE UP (ref 7–23)
BUN SERPL-MCNC: 14 MG/DL — SIGNIFICANT CHANGE UP (ref 7–23)
CALCIUM SERPL-MCNC: 8.1 MG/DL — LOW (ref 8.4–10.5)
CALCIUM SERPL-MCNC: 8.2 MG/DL — LOW (ref 8.4–10.5)
CALCIUM SERPL-MCNC: 8.3 MG/DL — LOW (ref 8.4–10.5)
CHLORIDE SERPL-SCNC: 106 MMOL/L — SIGNIFICANT CHANGE UP (ref 98–107)
CHLORIDE SERPL-SCNC: 107 MMOL/L — SIGNIFICANT CHANGE UP (ref 98–107)
CHLORIDE SERPL-SCNC: 108 MMOL/L — HIGH (ref 98–107)
CHOLEST SERPL-MCNC: 149 MG/DL — SIGNIFICANT CHANGE UP
CO2 SERPL-SCNC: 21 MMOL/L — LOW (ref 22–31)
CO2 SERPL-SCNC: 21 MMOL/L — LOW (ref 22–31)
CO2 SERPL-SCNC: 23 MMOL/L — SIGNIFICANT CHANGE UP (ref 22–31)
CREAT SERPL-MCNC: 0.62 MG/DL — SIGNIFICANT CHANGE UP (ref 0.5–1.3)
CREAT SERPL-MCNC: 0.66 MG/DL — SIGNIFICANT CHANGE UP (ref 0.5–1.3)
CREAT SERPL-MCNC: 0.68 MG/DL — SIGNIFICANT CHANGE UP (ref 0.5–1.3)
EGFR: 128 ML/MIN/1.73M2 — SIGNIFICANT CHANGE UP
EGFR: 129 ML/MIN/1.73M2 — SIGNIFICANT CHANGE UP
EGFR: 132 ML/MIN/1.73M2 — SIGNIFICANT CHANGE UP
FLUAV AG NPH QL: SIGNIFICANT CHANGE UP
FLUBV AG NPH QL: SIGNIFICANT CHANGE UP
GLUCOSE SERPL-MCNC: 107 MG/DL — HIGH (ref 70–99)
GLUCOSE SERPL-MCNC: 96 MG/DL — SIGNIFICANT CHANGE UP (ref 70–99)
GLUCOSE SERPL-MCNC: 97 MG/DL — SIGNIFICANT CHANGE UP (ref 70–99)
HDLC SERPL-MCNC: 37 MG/DL — LOW
LIPID PNL WITH DIRECT LDL SERPL: SIGNIFICANT CHANGE UP MG/DL
NON HDL CHOLESTEROL: 112 MG/DL — SIGNIFICANT CHANGE UP
POTASSIUM SERPL-MCNC: 4.3 MMOL/L — SIGNIFICANT CHANGE UP (ref 3.5–5.3)
POTASSIUM SERPL-MCNC: 4.5 MMOL/L — SIGNIFICANT CHANGE UP (ref 3.5–5.3)
POTASSIUM SERPL-MCNC: 4.8 MMOL/L — SIGNIFICANT CHANGE UP (ref 3.5–5.3)
POTASSIUM SERPL-SCNC: 4.3 MMOL/L — SIGNIFICANT CHANGE UP (ref 3.5–5.3)
POTASSIUM SERPL-SCNC: 4.5 MMOL/L — SIGNIFICANT CHANGE UP (ref 3.5–5.3)
POTASSIUM SERPL-SCNC: 4.8 MMOL/L — SIGNIFICANT CHANGE UP (ref 3.5–5.3)
PROT SERPL-MCNC: 5.9 G/DL — LOW (ref 6–8.3)
PROT SERPL-MCNC: 6.1 G/DL — SIGNIFICANT CHANGE UP (ref 6–8.3)
PROT SERPL-MCNC: 6.3 G/DL — SIGNIFICANT CHANGE UP (ref 6–8.3)
RSV RNA NPH QL NAA+NON-PROBE: SIGNIFICANT CHANGE UP
SARS-COV-2 RNA SPEC QL NAA+PROBE: SIGNIFICANT CHANGE UP
SODIUM SERPL-SCNC: 140 MMOL/L — SIGNIFICANT CHANGE UP (ref 135–145)
SODIUM SERPL-SCNC: 141 MMOL/L — SIGNIFICANT CHANGE UP (ref 135–145)
SODIUM SERPL-SCNC: 142 MMOL/L — SIGNIFICANT CHANGE UP (ref 135–145)
TRIGL SERPL-MCNC: 588 MG/DL — HIGH
TROPONIN T, HIGH SENSITIVITY RESULT: 7 NG/L — SIGNIFICANT CHANGE UP
TROPONIN T, HIGH SENSITIVITY RESULT: 8 NG/L — SIGNIFICANT CHANGE UP
TROPONIN T, HIGH SENSITIVITY RESULT: <6 NG/L — SIGNIFICANT CHANGE UP

## 2024-05-11 RX ORDER — SODIUM CHLORIDE 9 MG/ML
1000 INJECTION INTRAMUSCULAR; INTRAVENOUS; SUBCUTANEOUS ONCE
Refills: 0 | Status: COMPLETED | OUTPATIENT
Start: 2024-05-11 | End: 2024-05-11

## 2024-05-11 RX ADMIN — SODIUM CHLORIDE 1000 MILLILITER(S): 9 INJECTION INTRAMUSCULAR; INTRAVENOUS; SUBCUTANEOUS at 04:38

## 2024-05-11 RX ADMIN — Medication 100 MILLIGRAM(S): at 05:48

## 2024-05-13 ENCOUNTER — APPOINTMENT (OUTPATIENT)
Age: 29
End: 2024-05-13
Payer: COMMERCIAL

## 2024-05-13 VITALS
SYSTOLIC BLOOD PRESSURE: 136 MMHG | WEIGHT: 245 LBS | HEART RATE: 79 BPM | DIASTOLIC BLOOD PRESSURE: 70 MMHG | BODY MASS INDEX: 35.07 KG/M2 | OXYGEN SATURATION: 98 % | TEMPERATURE: 97.3 F | HEIGHT: 70 IN | RESPIRATION RATE: 16 BRPM

## 2024-05-13 DIAGNOSIS — K21.9 GASTRO-ESOPHAGEAL REFLUX DISEASE W/OUT ESOPHAGITIS: ICD-10-CM

## 2024-05-13 DIAGNOSIS — G47.33 OBSTRUCTIVE SLEEP APNEA (ADULT) (PEDIATRIC): ICD-10-CM

## 2024-05-13 LAB — BACTERIA SPT CULT: ABNORMAL

## 2024-05-13 PROCEDURE — 99214 OFFICE O/P EST MOD 30 MIN: CPT

## 2024-05-13 PROCEDURE — G2211 COMPLEX E/M VISIT ADD ON: CPT | Mod: NC,1L

## 2024-05-13 RX ORDER — GABAPENTIN 100 MG/1
100 CAPSULE ORAL 3 TIMES DAILY
Qty: 90 | Refills: 1 | Status: ACTIVE | COMMUNITY
Start: 2024-05-13 | End: 1900-01-01

## 2024-05-13 RX ORDER — PREDNISONE 10 MG/1
10 TABLET ORAL
Qty: 50 | Refills: 0 | Status: ACTIVE | COMMUNITY
Start: 2024-05-13 | End: 1900-01-01

## 2024-05-13 NOTE — ASSESSMENT
[FreeTextEntry1] : Mr. Mathur is a 29year old male with a history of SOB, GERD, poor sleep, Tourette's syndrome, asthma, allergy, ADD, sleep apnea, ?RLS, insomnia, obesity, OCD, and anxiety presenting to the office for a follow up pulmonary re-evaluation - +food allergies. Dx of hypogonadism, hypothyroidism, pituitary tumor, allergies- improved - s/p covid asthma 11/2022 - #1 issue s/p chronic cough; Rhinoentero virus para-influenza    His shortness of breath is multifactorial due to: -obesity/out of shape -poor breathing mechanics Less likely -?CAD -?asthma  problem 1: abnormal PFTs-  c/w Asthma - active -add Prednisone 30 mg x 5 days, 20 mg x 5 days, 10 mg x 5 days -MCT c/w asthma -confirms sleep apnea - inspiratory limb flattened -continue Breo 200 1 inhalation QD or Symbicort 160 2 inhalations BID  -continue Ventolin 2 puffs Q6H or trelegy -continue Singulair 10 mg QHS   Problem 1A: Active Bronchitis (Rhinoentero virus para-influenza) -add Augmentin 875 mg BID -add gabapentin 100 mg Q8H  problem 2: STEPHANIE- ?still present - HSS repeat -Settings: Dreamstation APAP CPAP mode of 10 cm H2O with FFM ([employer] [facility]) -s/p off Modafinil 200 mg QAM due to "crashing"   -add Nuvigil 250 mg QAM (ISTOP) -Sleep apnea is associated with adverse clinical consequences which an affect most organ systems.  Cardiovascular disease risk includes arrhythmias, atrial fibrillation, hypertension, coronary artery disease, and stroke. Metabolic disorders include diabetes type 2, non-alcoholic fatty liver disease. Mood disorder especially depression; and cognitive decline especially in the elderly. Associations with  chronic reflux/Leahy's esophagus some but not all inclusive.  -Reasons  include arousal consistent with hypopnea; respiratory events most prominent in REM sleep or supine position; therefore sleep staging and body position are important for accurate diagnosis and estimation of AHI.   Problem 3: Suspected COVID-19 infection (not found) -s/p COVID-19 vaccine x3  -s/p Alkaseltzer cold and flu  -s/p quarantine for 10 days  COVID-19 precautionary Immune Support Recommendations: -OTC Vitamin C 500mg BID  -OTC Quercetin 250-500mg BID  -OTC Zinc 75-100mg per day  -OTC Melatonin 1 or 2mg a night  -OTC Vitamin D 1-4000mg per day  -OTC Tonic Water 8oz per day -Water 0.5-1 gallon per day Asthma and COVID19: You need to make sure your asthma is under control. This often requires the use of inhaled corticosteroids (and sometimes oral corticosteroids). Inhaled corticosteroids do not likely reduce your immune system's ability to fight infections, but oral corticosteroids may. It is important to use the steps above to protect yourself to limit your exposure to any respiratory virus.   Problem 4: s/p Pre-Op Clearance for Bariatric Surgery - (aborted) -at this point in time there are no absolute pulmonary contraindications to go forward with the planned procedure  -at the time of surgery s/he should have optimal pain control, incentive spirometry, early ambulation, DVT and GI prophylaxis.   problem 5: insomnia/poor sleep (improved) -Recommended to use Insomnitol (take 1st) / Requip .5 mg -recommended to use Sleep Guard (2nd, if he wakes up) -Recommended Perrigo OTC  -recommended to use sunglasses 30 minutes before bedtime and to try room darkening window shades -Good sleep hygiene was encouraged including avoiding watching television an hour before bed, keeping caffeine at a low,  avoiding reading, television, or anything, in bed, no drinking any liquids three hours before bedtime, and only getting into bed when tired and ready for sleep.   problem 6: RLS - He is s/p blood work, which revealed: ferritin level (normal), iron binding level (normal), testosterone level (low), TFT (normal) and vitamin D level (normal) - continue  Mirapex 0.5mg QHS -Restless Legs Syndrome (RLS), also known as Stewart-Ekbom Disease, is a common sleep -related movement disorder. About 1 in 10 adults in the [place] have problems from restless leg syndrome. It also can be seen in about 2% of children. Women are twice as likely as men to have RLS. People with RLS will have symptoms  most often during times when they are less active, especially at bedtime. RLS most often causes an overwhelming urge to move your legs and sometimes other parts of your body. This urge is associated with unpleasant sensations in different parts of th body. The symptoms can be mild to severe and can affect your ability to go to sleep and stay asleep. People with RLS often sleep less at night and feel more tired during the day.     problem 7: allergy sinus  -continue Olopatadine 0.6% 1 sniff BID  - add Clarinex 5 mg QAM Environmental measures for allergies were encouraged including mattress and pillow cover, air purifier, and environmental controls   problem 8: residual fatigue -s/p Modafinil 200 mg QAM (5AM) - I-STOP reviewed due to "crashing"  -add Nuvigil 250 mg QAM (ISTOP) -add Boron 6 mg QD  -endocrinology results f/u   Problem 9: GERD - Continue Prilosec 20 mg QAM before breakfast -continue Pepcid 40mg QHS  - Irwin Feldman - c/w mild GERD/HH   Things to avoid including overeating, spicy foods, tight clothing, eating within three hours of bed, this list is not all inclusive.  -Rule of 2s: avoid eating too much, eating too late, eating too spicy, eating two hours before bed -For treatment of reflux, possible options discussed including diet control, H2 blockers, PPIs, as well as coating motility agents discussed as treatment options. Timing of meals and proximity of last meal to sleep were discussed. If symptoms persist, a formal gastrointestinal evaluation is needed.   problem 10: low testosterone (off Rx) -recommended supplemental [place] 6mg QD - restart 4/2023 -Off testosterone Rx (Abelev) -Referred to endocrinologist for further evaluation (Abelev) - Referred to urologist (Dave Hoenig)  problem 11: poor breathing mechanics -Recommended Ramón De Dios and Buteykyle breathing techniques  -Proper breathing techniques were reviewed with an emphasis of exhalation. Patient instructed to breath in for 1 second and out for four seconds. Patient was encouraged to not talk while walking.   problem 12: obesity/out of shape - off Mounjaro as of 6/2023  - Mason Joni 10 days detox  - recommended to see Dr. Luca Gill et al -recommended to increase protein and decrease carbohydrates -recommended to reduce caffeine and increase water intake -Weight loss, exercise, and diet control were discussed and are highly encouraged. Treatment options were given such as, aqua therapy, and contacting a nutritionist. Recommended to use the elliptical, stationary bike, refrain from use of treadmill. Mindful eating was explained to the patient. Obesity is associated with worsening asthma, shortness of breath, and potential for cardiac disease, diabetes, and other underlying medical conditions.  Problem 12a: COVID-19 precautionary Immune Support Recommendations: -s/p COVID-19 11/2022 -OTC Vitamin C 500mg BID  -OTC Quercetin 250-500mg BID  -OTC Zinc 75-100mg per day  -OTC Melatonin 1 or 2mg a night  -OTC Vitamin D 1-4000mg per day  -OTC Tonic Water 8oz per day -Water 0.5-1 gallon per day  problem 13: health maintenance- [facility] Stretches  -Nocturnal sweats - Recommend pharmacy consult regarding his medication -s/p Covid-19 vaccine Pfizer x3 -s/p Influenza vaccine 2023 -recommended strep pneumonia vaccines: Prevnar-13 vaccine, followed by Pneumo vaccine 23 one year following -recommended early intervention for URIs -recommended regular osteoporosis evaluations -recommended early dermatological evaluations -recommended after the age of 50 to the age of 70, colonoscopy every 5 years     Follow up in 4 months  Patient is encouraged to call with any changes, concerns or questions.

## 2024-05-13 NOTE — ADDENDUM
[FreeTextEntry1] : Documented by Vik Young acting as a scribe for Dr. Brant Marin on 05/13/2024 All medical record entries made by the Scribe were at my, Dr. Brant Marin's, direction and personally dictated by me on 05/13/2024 . I have reviewed the chart and agree that the record accurately reflects my personal performance of the history, physical exam, assessment and plan. I have also personally directed, reviewed, and agree with the discharge instructions.

## 2024-05-13 NOTE — PHYSICAL EXAM
[No Acute Distress] : no acute distress [Normal Oropharynx] : normal oropharynx [III] : Mallampati Class: III [Normal Appearance] : normal appearance [No Neck Mass] : no neck mass [Normal Rate/Rhythm] : normal rate/rhythm [Normal S1, S2] : normal s1, s2 [No Murmurs] : no murmurs [No Resp Distress] : no resp distress [Clear to Auscultation Bilaterally] : clear to auscultation bilaterally [No Abnormalities] : no abnormalities [Benign] : benign [Normal Gait] : normal gait [No Clubbing] : no clubbing [No Cyanosis] : no cyanosis [No Edema] : no edema [FROM] : FROM [Normal Color/ Pigmentation] : normal color/ pigmentation [No Focal Deficits] : no focal deficits [Oriented x3] : oriented x3 [Normal Affect] : normal affect [TextBox_68] : I:E ratio 1:3; mild expiratory wheezes bilaterally

## 2024-05-13 NOTE — HISTORY OF PRESENT ILLNESS
[FreeTextEntry1] : Mr. Mathur is a 28 year old male with a history of abnormal PFTs, GERD, low testosterone, STEPHANIE on CPAP, overweight, poor sleep, RLS, and SOB presenting to the office today for a follow up visit. His chief complaint is   -he notes congestion in sinuses -he notes using Singulair, lexopro, and nebulizers -he notes vision is stable -he notes bowels are regular -he notes balance is stable -he notes energy levels are relatively good -he notes good quality of sleep -he notes using Breo nebulizer  -he notes using albuterol 3x per day    -he denies any headaches, nausea, emesis, fever, chills, sweats, chest pain, coughing, wheezing, palpitations, diarrhea, constipation, dysphagia, vertigo, arthralgias, myalgias, leg swelling, itchy eyes, itchy ears, heartburn, reflux, or sour taste in the mouth.

## 2024-05-29 ENCOUNTER — APPOINTMENT (OUTPATIENT)
Dept: PULMONOLOGY | Facility: CLINIC | Age: 29
End: 2024-05-29
Payer: COMMERCIAL

## 2024-05-29 DIAGNOSIS — R05.9 COUGH, UNSPECIFIED: ICD-10-CM

## 2024-05-29 DIAGNOSIS — J45.901 ACUTE BRONCHITIS, UNSPECIFIED: ICD-10-CM

## 2024-05-29 DIAGNOSIS — J45.909 UNSPECIFIED ASTHMA, UNCOMPLICATED: ICD-10-CM

## 2024-05-29 DIAGNOSIS — J20.9 ACUTE BRONCHITIS, UNSPECIFIED: ICD-10-CM

## 2024-05-29 DIAGNOSIS — R06.02 SHORTNESS OF BREATH: ICD-10-CM

## 2024-05-29 PROCEDURE — 99442: CPT | Mod: 93

## 2024-05-29 RX ORDER — ALBUTEROL SULFATE 0.63 MG/3ML
0.63 SOLUTION RESPIRATORY (INHALATION)
Qty: 1 | Refills: 1 | Status: ACTIVE | COMMUNITY
Start: 2024-05-08 | End: 1900-01-01

## 2024-05-29 NOTE — REVIEW OF SYSTEMS
[Cough] : cough [Sputum] : sputum [Seasonal Allergies] : seasonal allergies [Negative] : Cardiovascular

## 2024-05-29 NOTE — HISTORY OF PRESENT ILLNESS
[TextBox_4] : Mr. Mathur is a 28 year old male with a history of abnormal PFTs, GERD, low testosterone, STEPHANIE on CPAP, overweight, poor sleep, RLS, and SOB presenting to the office today for a follow up visit. His chief complaint is  -he notes congestion in sinuses -he notes using Singulair, lexopro, and nebulizers -he notes vision is stable -he notes bowels are regular -he notes balance is stable -he notes energy levels are relatively good -he notes good quality of sleep -he notes using Breo nebulizer -he notes using albuterol 3x per day  -he denies any headaches, nausea, emesis, fever, chills, sweats, chest pain, coughing, wheezing, palpitations, diarrhea, constipation, dysphagia, vertigo, arthralgias, myalgias, leg swelling, itchy eyes, itchy ears, heartburn, reflux, or sour taste in the mouth.  TODAY'S VISIT 5/29/24 Mr. Mathur is a 29 year old male with a history of abnormal PFTs, GERD, low testosterone, STEPHANIE on CPAP, overweight, poor sleep, RLS, and SOB presenting for a follow up visit via telephonic visit:  -reports his symptoms are improving, but is still coughing -reports he is complainant with his medications/nebulizer -reports he has started Gabapentin with good relief -reports he has finished Augmentin -reports he has one more day left on Prednisone -reports overall feeling well -reports he has lost weight now off PAP therapy  -he denies any headaches, nausea, emesis, fever, chills, sweats, chest pain, coughing, wheezing, palpitations, diarrhea, constipation, dysphagia, vertigo, arthralgias, myalgias, leg swelling, itchy eyes, itchy ears, heartburn, reflux, or sour taste in the mouth.

## 2024-05-29 NOTE — ASSESSMENT
[FreeTextEntry1] : Mr. Mathur is a 29 year old male with a history of SOB, GERD, poor sleep, Tourette's syndrome, asthma, allergy, ADD, sleep apnea, RLS, insomnia, obesity, OCD, and anxiety presenting to the office via telephonic visit for SOB and cough  His shortness of breath is multifactorial due to: -asthma  problem 1: Asthma - stable -add Prednisone 30 mg x 5 days, 20 mg x 5 days, 10 mg x 5 days - finished course -MCT c/w asthma -continue Breo 200 1 inhalation QD or Symbicort 160 2 inhalations BID -continue Ventolin 2 puffs Q6H or trelegy -continue Singulair 10 mg QHS  Problem 1A: Active Bronchitis (Rhinoentero virus para-influenza) -add Augmentin 875 mg BID - finished course -continue gabapentin 100 mg Q8H - having good relief  problem 2: STEPHANIE- ?still present - HSS repeat -Settings: Dreamstation APAP CPAP mode of 10 cm H2O with FFM ([employer]) -s/p off Modafinil 200 mg QAM due to "crashing" -add Nuvigil 250 mg QAM (ISTOP) -Sleep apnea is associated with adverse clinical consequences which an affect most organ systems. Cardiovascular disease risk includes arrhythmias, atrial fibrillation, hypertension, coronary artery disease, and stroke. Metabolic disorders include diabetes type 2, non-alcoholic fatty liver disease. Mood disorder especially depression; and cognitive decline especially in the elderly. Associations with chronic reflux/Leahy's esophagus some but not all inclusive. -Reasons include arousal consistent with hypopnea; respiratory events most prominent in REM sleep or supine position; therefore sleep staging and body position are important for accurate diagnosis and estimation of AHI.  problem 7: allergy sinus -continue Olopatadine 0.6% 1 sniff BID -continue Clarinex 5 mg QAM -has 3 cats and a dog -add air purifier Environmental measures for allergies were encouraged including mattress and pillow cover, air purifier, and environmental controls  health maintenance- [facility] Stretches -Nocturnal sweats - Recommend pharmacy consult regarding his medication -s/p Covid-19 vaccine Pfizer x3 -s/p Influenza vaccine 2023 -recommended strep pneumonia vaccines: Prevnar-13 vaccine, followed by Pneumo vaccine 23 one year following -recommended early intervention for URIs -recommended regular osteoporosis evaluations -recommended early dermatological evaluations -recommended after the age of 50 to the age of 70, colonoscopy every 5 years   Follow up in 4 months Patient is encouraged to call with any changes, concerns or questions.

## 2024-05-31 NOTE — ED PROVIDER NOTE - IV ALTEPLASE EXCL ABS HIDDEN
"[facility]    Brief Operative Note    Pre-operative diagnosis: Ascending aortic aneurysm (H24) [I71.21]  Post-operative diagnosis Same as pre-operative diagnosis    Procedure: ASCENDING AORTA ANEURYSM REPAIR WITH HEMASHIELD PLATINUM GRAFT SIZE: 30MM, AND ON CARDIOPULMONARY PUMP OXYGENATOR  (INTRAOPERATIVE TRANSESOPHAGEAL ECHOCARDIOGRAM BY ANESTHESIOLOGIST), N/A - Chest    Surgeon: Surgeons and Role:     * Candis Torres MD - Primary     * Zurdo Guerrero PA-C - Assisting     * Von Sanchez MD - Assisting  Anesthesia: General   Estimated Blood Loss: 1000 ml    Drains:  3x mediastinal CT    Specimens:   ID Type Source Tests Collected by Time Destination   1 : ASCENDING AORTA Tissue Aorta SURGICAL PATHOLOGY EXAM Candis Torres MD 5/31/2024  8:45 AM    A : POST CBP STAT LABS Blood Line, arterial CBC WITH PLATELETS, BASIC METABOLIC PANEL, FIBRINOGEN ACTIVITY, PARTIAL THROMBOPLASTIN TIME, INR Maryan Begum APRN CRNA 5/31/2024  9:42 AM      Findings:   Janet ascending aneurysm, 30cm hemashield graft replacement   .  Complications: None.  Implants:   Implant Name Type Inv. Item Serial No.  Lot No. LRB No. Used Action   GRAFT PTFE FELT 6X6\" [id number] - [id number] Graft GRAFT PTFE FELT 6X6\" [id number]  [employer] [id number] N/A 1 Implanted   GRAFT HEMASHIELD PLATINUM [id number] - [id number] Graft GRAFT HEMASHIELD PLATINUM [id number] Gift Card [employer] [id number] N/A 1 Implanted             " show

## 2024-07-09 ENCOUNTER — TRANSCRIPTION ENCOUNTER (OUTPATIENT)
Age: 29
End: 2024-07-09

## 2024-07-09 ENCOUNTER — APPOINTMENT (OUTPATIENT)
Dept: ENDOCRINOLOGY | Facility: CLINIC | Age: 29
End: 2024-07-09

## 2024-07-10 ENCOUNTER — APPOINTMENT (OUTPATIENT)
Dept: PULMONOLOGY | Facility: CLINIC | Age: 29
End: 2024-07-10
Payer: COMMERCIAL

## 2024-07-10 VITALS
SYSTOLIC BLOOD PRESSURE: 130 MMHG | WEIGHT: 242 LBS | RESPIRATION RATE: 16 BRPM | OXYGEN SATURATION: 98 % | DIASTOLIC BLOOD PRESSURE: 84 MMHG | HEART RATE: 86 BPM | TEMPERATURE: 97.6 F | HEIGHT: 70 IN | BODY MASS INDEX: 34.65 KG/M2

## 2024-07-10 DIAGNOSIS — J45.901 UNSPECIFIED ASTHMA WITH (ACUTE) EXACERBATION: ICD-10-CM

## 2024-07-10 DIAGNOSIS — R05.9 COUGH, UNSPECIFIED: ICD-10-CM

## 2024-07-10 DIAGNOSIS — J30.9 ALLERGIC RHINITIS, UNSPECIFIED: ICD-10-CM

## 2024-07-10 DIAGNOSIS — G47.33 OBSTRUCTIVE SLEEP APNEA (ADULT) (PEDIATRIC): ICD-10-CM

## 2024-07-10 PROCEDURE — 99214 OFFICE O/P EST MOD 30 MIN: CPT

## 2024-07-22 ENCOUNTER — TRANSCRIPTION ENCOUNTER (OUTPATIENT)
Age: 29
End: 2024-07-22

## 2024-07-29 ENCOUNTER — APPOINTMENT (OUTPATIENT)
Dept: ENDOCRINOLOGY | Facility: CLINIC | Age: 29
End: 2024-07-29

## 2024-08-09 ENCOUNTER — RX RENEWAL (OUTPATIENT)
Age: 29
End: 2024-08-09

## 2024-08-16 ENCOUNTER — APPOINTMENT (OUTPATIENT)
Dept: PULMONOLOGY | Facility: CLINIC | Age: 29
End: 2024-08-16
Payer: COMMERCIAL

## 2024-08-16 VITALS
HEIGHT: 70 IN | BODY MASS INDEX: 33.64 KG/M2 | DIASTOLIC BLOOD PRESSURE: 80 MMHG | RESPIRATION RATE: 16 BRPM | WEIGHT: 235 LBS | HEART RATE: 91 BPM | TEMPERATURE: 98 F | SYSTOLIC BLOOD PRESSURE: 122 MMHG | OXYGEN SATURATION: 97 %

## 2024-08-16 DIAGNOSIS — R94.2 ABNORMAL RESULTS OF PULMONARY FUNCTION STUDIES: ICD-10-CM

## 2024-08-16 DIAGNOSIS — J45.909 UNSPECIFIED ASTHMA, UNCOMPLICATED: ICD-10-CM

## 2024-08-16 DIAGNOSIS — G47.33 OBSTRUCTIVE SLEEP APNEA (ADULT) (PEDIATRIC): ICD-10-CM

## 2024-08-16 DIAGNOSIS — J20.9 ACUTE BRONCHITIS, UNSPECIFIED: ICD-10-CM

## 2024-08-16 DIAGNOSIS — R06.02 SHORTNESS OF BREATH: ICD-10-CM

## 2024-08-16 DIAGNOSIS — J30.9 ALLERGIC RHINITIS, UNSPECIFIED: ICD-10-CM

## 2024-08-16 DIAGNOSIS — K21.9 GASTRO-ESOPHAGEAL REFLUX DISEASE W/OUT ESOPHAGITIS: ICD-10-CM

## 2024-08-16 DIAGNOSIS — E66.9 OBESITY, UNSPECIFIED: ICD-10-CM

## 2024-08-16 DIAGNOSIS — J45.901 ACUTE BRONCHITIS, UNSPECIFIED: ICD-10-CM

## 2024-08-16 PROCEDURE — 94727 GAS DIL/WSHOT DETER LNG VOL: CPT

## 2024-08-16 PROCEDURE — ZZZZZ: CPT

## 2024-08-16 PROCEDURE — 94729 DIFFUSING CAPACITY: CPT

## 2024-08-16 PROCEDURE — 99214 OFFICE O/P EST MOD 30 MIN: CPT | Mod: 25

## 2024-08-16 PROCEDURE — 95012 NITRIC OXIDE EXP GAS DETER: CPT

## 2024-08-16 PROCEDURE — 94010 BREATHING CAPACITY TEST: CPT

## 2024-08-16 NOTE — ASSESSMENT
[FreeTextEntry1] : Mr. Mathur is a 29year old male with a history of SOB, GERD, poor sleep, Tourette's syndrome, asthma, allergy, ADD, sleep apnea, ?RLS, insomnia, obesity, OCD, and anxiety presenting to the office for a follow up pulmonary re-evaluation - +food allergies. Dx of hypogonadism, hypothyroidism, pituitary tumor, allergies- improved - s/p covid asthma 11/2022 - #1 issue s/p chronic cough; Rhinoentero virus para-influenza 4/2024- residual AM cough   His shortness of breath is multifactorial due to: -obesity/out of shape -poor breathing mechanics Less likely -?CAD -?asthma  problem 1: abnormal PFTs-  c/w Asthma - active -s/p Prednisone 30 mg x 5 days, 20 mg x 5 days, 10 mg x 5 days 4/2024 -MCT c/w asthma -continue Breo 200 1 inhalation QD or Symbicort 160 2 inhalations BID  -continue Spiriva at 2 inhalations QAM  -continue Ventolin 2 puffs Q6H -continue Singulair 10 mg QHS   Problem 1A: Active Bronchitis (Rhinoentero virus para-influenza)- resolved 4/2024 -Augmentin 875 mg BID -gabapentin 100 mg Q8H  problem 2: STEPHANIE- ?still present - HSS repeat -Settings: Dreamstation APAP CPAP mode of 10 cm H2O with FFM ([employer]) (off Rx) -s/p off Modafinil 200 mg QAM due to "crashing"   -continue Nuvigil 250 mg QAM (ISTOP) -Sleep apnea is associated with adverse clinical consequences which an affect most organ systems.  Cardiovascular disease risk includes arrhythmias, atrial fibrillation, hypertension, coronary artery disease, and stroke. Metabolic disorders include diabetes type 2, non-alcoholic fatty liver disease. Mood disorder especially depression; and cognitive decline especially in the elderly. Associations with  chronic reflux/Leahy's esophagus some but not all inclusive.  -Reasons  include arousal consistent with hypopnea; respiratory events most prominent in REM sleep or supine position; therefore sleep staging and body position are important for accurate diagnosis and estimation of AHI.   Problem 3: Suspected COVID-19 infection (not found) -s/p COVID-19 vaccine x3  -s/p Alkaseltzer cold and flu  -s/p quarantine for 10 days  COVID-19 precautionary Immune Support Recommendations: -OTC Vitamin C 500mg BID  -OTC Quercetin 250-500mg BID  -OTC Zinc 75-100mg per day  -OTC Melatonin 1 or 2mg a night  -OTC Vitamin D 1-4000mg per day  -OTC Tonic Water 8oz per day -Water 0.5-1 gallon per day Asthma and COVID19: You need to make sure your asthma is under control. This often requires the use of inhaled corticosteroids (and sometimes oral corticosteroids). Inhaled corticosteroids do not likely reduce your immune system's ability to fight infections, but oral corticosteroids may. It is important to use the steps above to protect yourself to limit your exposure to any respiratory virus.   Problem 4: s/p Pre-Op Clearance for Bariatric Surgery - (aborted) -at this point in time there are no absolute pulmonary contraindications to go forward with the planned procedure  -at the time of surgery s/he should have optimal pain control, incentive spirometry, early ambulation, DVT and GI prophylaxis.   problem 5: insomnia/poor sleep (improved) -Recommended to use Insomnitol (take 1st) / Requip .5 mg -recommended to use Sleep Guard (2nd, if he wakes up) -Recommended Perrigo OTC  -recommended to use sunglasses 30 minutes before bedtime and to try room darkening window shades -Good sleep hygiene was encouraged including avoiding watching television an hour before bed, keeping caffeine at a low,  avoiding reading, television, or anything, in bed, no drinking any liquids three hours before bedtime, and only getting into bed when tired and ready for sleep.   problem 6: RLS - He is s/p blood work, which revealed: ferritin level (normal), iron binding level (normal), testosterone level (low), TFT (normal) and vitamin D level (normal) - continue  Mirapex 0.5mg QHS -Restless Legs Syndrome (RLS), also known as Stewart-Ekbom Disease, is a common sleep -related movement disorder. About 1 in 10 adults in the [place] have problems from restless leg syndrome. It also can be seen in about 2% of children. Women are twice as likely as men to have RLS. People with RLS will have symptoms  most often during times when they are less active, especially at bedtime. RLS most often causes an overwhelming urge to move your legs and sometimes other parts of your body. This urge is associated with unpleasant sensations in different parts of th body. The symptoms can be mild to severe and can affect your ability to go to sleep and stay asleep. People with RLS often sleep less at night and feel more tired during the day.   problem 7: allergy sinus  -continue Olopatadine 0.6% 1 sniff BID  - add Clarinex 5 mg QAM Environmental measures for allergies were encouraged including mattress and pillow cover, air purifier, and environmental controls   problem 8: residual fatigue -s/p Modafinil 200 mg QAM (5AM) - I-STOP reviewed due to "crashing"  -Nuvigil 250 mg QAM (ISTOP) in progress -add Boron 6 mg QD  -endocrinology results f/u   Problem 9: GERD - Continue Prilosec 20 mg QAM before breakfast -continue Pepcid 40mg QHS  - Kershaw Feldman - c/w mild GERD/HH   Things to avoid including overeating, spicy foods, tight clothing, eating within three hours of bed, this list is not all inclusive.  -Rule of 2s: avoid eating too much, eating too late, eating too spicy, eating two hours before bed -For treatment of reflux, possible options discussed including diet control, H2 blockers, PPIs, as well as coating motility agents discussed as treatment options. Timing of meals and proximity of last meal to sleep were discussed. If symptoms persist, a formal gastrointestinal evaluation is needed.   problem 10: low testosterone (off Rx) -recommended supplemental [place] 6mg QD - restart 4/2023 -Off testosterone Rx ([facility]) -Referred to endocrinologist for further evaluation ([facility]) - Referred to urologist (Dave Hoenig)  problem 11: poor breathing mechanics -Recommended Wikyle De Dios and Buteyko breathing techniques  -Proper breathing techniques were reviewed with an emphasis of exhalation. Patient instructed to breath in for 1 second and out for four seconds. Patient was encouraged to not talk while walking.   problem 12: obesity/out of shape - off Mounjaro as of 6/2023  - Mason Joni 10 days detox  - recommended to see Dr. Luca Singh -recommended to increase protein and decrease carbohydrates -recommended to reduce caffeine and increase water intake -Weight loss, exercise, and diet control were discussed and are highly encouraged. Treatment options were given such as, aqua therapy, and contacting a nutritionist. Recommended to use the elliptical, stationary bike, refrain from use of treadmill. Mindful eating was explained to the patient. Obesity is associated with worsening asthma, shortness of breath, and potential for cardiac disease, diabetes, and other underlying medical conditions.  Problem 12a: COVID-19 precautionary Immune Support Recommendations: -s/p COVID-19 11/2022 -OTC Vitamin C 500mg BID  -OTC Quercetin 250-500mg BID  -OTC Zinc 75-100mg per day  -OTC Melatonin 1 or 2mg a night  -OTC Vitamin D 1-4000mg per day  -OTC Tonic Water 8oz per day -Water 0.5-1 gallon per day  problem 13: health maintenance- [facility] Stretches  -Nocturnal sweats - Recommend pharmacy consult regarding his medication -s/p Covid-19 vaccine Pfizer x3 -s/p Influenza vaccine 2023 -recommended strep pneumonia vaccines: Prevnar-13 vaccine, followed by Pneumo vaccine 23 one year following -recommended early intervention for URIs -recommended regular osteoporosis evaluations -recommended early dermatological evaluations -recommended after the age of 50 to the age of 70, colonoscopy every 5 years    Follow up in 4 months  Patient is encouraged to call with any changes, concerns or questions.

## 2024-08-16 NOTE — PHYSICAL EXAM
[No Acute Distress] : no acute distress [Normal Oropharynx] : normal oropharynx [III] : Mallampati Class: III [Normal Appearance] : normal appearance [No Neck Mass] : no neck mass [Normal Rate/Rhythm] : normal rate/rhythm [Normal S1, S2] : normal s1, s2 [No Murmurs] : no murmurs [No Resp Distress] : no resp distress [No Abnormalities] : no abnormalities [Benign] : benign [Normal Gait] : normal gait [No Clubbing] : no clubbing [No Cyanosis] : no cyanosis [No Edema] : no edema [FROM] : FROM [Normal Color/ Pigmentation] : normal color/ pigmentation [No Focal Deficits] : no focal deficits [Normal Affect] : normal affect [Oriented x3] : oriented x3 [TextBox_68] : I:E ratio 1:3; mild forced expiratory wheeze

## 2024-08-16 NOTE — ADDENDUM
[FreeTextEntry1] : Documented by Ramona De La O acting as a scribe for Dr. Brant Marin on 08/16/2024. All medical record entries made by the Scribe were at my, Dr. Brant Marin's, direction and personally dictated by me on 08/16/2024. I have reviewed the chart and agree that the record accurately reflects my personal performance of the history, physical exam, assessment and plan. I have also personally directed, reviewed, and agree with the discharge instructions.

## 2024-08-16 NOTE — REASON FOR VISIT
[Follow-Up] : a follow-up visit [FreeTextEntry1] : abnormal PFTs, GERD, low testosterone, STEPHANIE (off CPAP), overweight, poor sleep, RLS, COVID-19 11/2022 asthma and SOB

## 2024-08-16 NOTE — HISTORY OF PRESENT ILLNESS
[FreeTextEntry1] : Mr. Mathur is a 29 year old male with a history of abnormal PFTs, GERD, low testosterone, STEPHANIE on CPAP, overweight, poor sleep, RLS, and SOB presenting to the office today for a follow-up pulmonary evaluation. His chief complaint is  -he notes his cough is slowly improving. He had severe bronchitis which began the end of 4/2024 and flared in May and June, when he'd cough so severely that he'd faint -he notes his cough is nonproductive and worst in the morning, within the first hour of waking -he notes he'll occasionally cough with exercise (treadmill). He exercises 3X per week -he notes he wakes up with mild sinus congestion, but it clears as the day progresses. When he blows his nose, it's mostly dry, but some mucus comes out -he denies dysphonia  -he notes he's slightly more fatigued s/p onset of his cough -he notes sleeping for 7-8 hours, restfully. He falls asleep within 5-10 minutes -he notes he's off the CPAP -he notes he's using Breo, Spiriva, albuterol inhaler PRN. He hasn't needed his nebulizer -he notes he's taking Singulair and Nuvigil. Nuvigil works very well for him. -he notes he hasn't yet gotten a repeat home sleep study -he notes he doesn't have any reflux -he notes he's off Mounjaro  -he denies any headaches, nausea, emesis, fever, chills, sweats, chest pain, chest pressure, wheezing, palpitations, diarrhea, constipation, dysphagia, vertigo, arthralgias, myalgias, leg swelling, itchy eyes, itchy ears, heartburn, reflux, or sour taste in the mouth.

## 2024-08-16 NOTE — PROCEDURE
[FreeTextEntry1] : Full PFT reveals normal flows; FEV1 was 4.79L which is 108% of predicted; normal lung volumes; normal diffusion at 34.2, which is 100% of predicted; normal flow volume loop. PFTs were performed to evaluate for SOB, asthma  FENO was 28; a normal value being less than 25 Fractional exhaled nitric oxide (FENO) is regarded as a simple, noninvasive method for assessing eosinophilic airway inflammation. Produced by a variety of cells within the lung, nitric oxide (NO) concentrations are generally low in healthy individuals. However, high concentrations of NO appear to be involved in nonspecific host defense mechanisms and chronic inflammatory diseases such as asthma. The American Thoracic Society (ATS) therefore has recommended using FENO to aid in the diagnosis and monitoring of eosinophilic airway inflammation and asthma, and for identifying steroid responsive individuals whose chronic respiratory symptoms may be caused by airway inflammation.

## 2024-09-18 ENCOUNTER — TRANSCRIPTION ENCOUNTER (OUTPATIENT)
Age: 29
End: 2024-09-18

## 2024-09-21 ENCOUNTER — RX RENEWAL (OUTPATIENT)
Age: 29
End: 2024-09-21

## 2024-10-14 ENCOUNTER — TRANSCRIPTION ENCOUNTER (OUTPATIENT)
Age: 29
End: 2024-10-14

## 2024-12-16 ENCOUNTER — TRANSCRIPTION ENCOUNTER (OUTPATIENT)
Age: 29
End: 2024-12-16

## 2024-12-30 ENCOUNTER — APPOINTMENT (OUTPATIENT)
Dept: ORTHOPEDIC SURGERY | Facility: CLINIC | Age: 29
End: 2024-12-30

## 2024-12-31 ENCOUNTER — TRANSCRIPTION ENCOUNTER (OUTPATIENT)
Age: 29
End: 2024-12-31

## 2025-01-02 ENCOUNTER — APPOINTMENT (OUTPATIENT)
Dept: PULMONOLOGY | Facility: CLINIC | Age: 30
End: 2025-01-02
Payer: COMMERCIAL

## 2025-01-02 VITALS
OXYGEN SATURATION: 98 % | BODY MASS INDEX: 32.21 KG/M2 | RESPIRATION RATE: 16 BRPM | DIASTOLIC BLOOD PRESSURE: 82 MMHG | SYSTOLIC BLOOD PRESSURE: 130 MMHG | TEMPERATURE: 98 F | WEIGHT: 225 LBS | HEIGHT: 70 IN | HEART RATE: 80 BPM

## 2025-01-02 DIAGNOSIS — J45.901 UNSPECIFIED ASTHMA WITH (ACUTE) EXACERBATION: ICD-10-CM

## 2025-01-02 DIAGNOSIS — K21.9 GASTRO-ESOPHAGEAL REFLUX DISEASE W/OUT ESOPHAGITIS: ICD-10-CM

## 2025-01-02 DIAGNOSIS — R06.02 SHORTNESS OF BREATH: ICD-10-CM

## 2025-01-02 DIAGNOSIS — R94.2 ABNORMAL RESULTS OF PULMONARY FUNCTION STUDIES: ICD-10-CM

## 2025-01-02 DIAGNOSIS — G25.81 RESTLESS LEGS SYNDROME: ICD-10-CM

## 2025-01-02 DIAGNOSIS — G47.33 OBSTRUCTIVE SLEEP APNEA (ADULT) (PEDIATRIC): ICD-10-CM

## 2025-01-02 DIAGNOSIS — J30.9 ALLERGIC RHINITIS, UNSPECIFIED: ICD-10-CM

## 2025-01-02 DIAGNOSIS — J45.909 UNSPECIFIED ASTHMA, UNCOMPLICATED: ICD-10-CM

## 2025-01-02 PROCEDURE — 95012 NITRIC OXIDE EXP GAS DETER: CPT

## 2025-01-02 PROCEDURE — 99214 OFFICE O/P EST MOD 30 MIN: CPT | Mod: 25

## 2025-01-02 PROCEDURE — 94010 BREATHING CAPACITY TEST: CPT

## 2025-01-02 RX ORDER — OLOPATADINE HYDROCHLORIDE AND MOMETASONE FUROATE 25; 665 UG/1; UG/1
665-25 SPRAY, METERED NASAL
Qty: 3 | Refills: 1 | Status: ACTIVE | COMMUNITY
Start: 2025-01-02 | End: 1900-01-01

## 2025-01-15 ENCOUNTER — APPOINTMENT (OUTPATIENT)
Dept: ORTHOPEDIC SURGERY | Facility: CLINIC | Age: 30
End: 2025-01-15
Payer: COMMERCIAL

## 2025-01-15 VITALS
WEIGHT: 220 LBS | HEART RATE: 87 BPM | HEIGHT: 70 IN | DIASTOLIC BLOOD PRESSURE: 81 MMHG | SYSTOLIC BLOOD PRESSURE: 126 MMHG | BODY MASS INDEX: 31.5 KG/M2

## 2025-01-15 DIAGNOSIS — Z87.81 PERSONAL HISTORY OF (HEALED) TRAUMATIC FRACTURE: ICD-10-CM

## 2025-01-15 DIAGNOSIS — S42.331D DISPLACED OBLIQUE FRACTURE OF SHAFT OF HUMERUS, RIGHT ARM, SUBSEQUENT ENCOUNTER FOR FRACTURE WITH ROUTINE HEALING: ICD-10-CM

## 2025-01-15 PROCEDURE — 73060 X-RAY EXAM OF HUMERUS: CPT

## 2025-01-15 PROCEDURE — 99214 OFFICE O/P EST MOD 30 MIN: CPT

## 2025-01-24 ENCOUNTER — RX RENEWAL (OUTPATIENT)
Age: 30
End: 2025-01-24

## 2025-04-01 ENCOUNTER — TRANSCRIPTION ENCOUNTER (OUTPATIENT)
Age: 30
End: 2025-04-01

## 2025-04-01 RX ORDER — AZELASTINE HYDROCHLORIDE AND FLUTICASONE PROPIONATE 137; 50 UG/1; UG/1
137-50 SPRAY, METERED NASAL
Qty: 3 | Refills: 1 | Status: DISCONTINUED | COMMUNITY
Start: 2025-04-01 | End: 2025-04-01

## 2025-04-01 RX ORDER — OLOPATADINE HYDROCHLORIDE 665 UG/1
0.6 SPRAY, METERED NASAL
Qty: 1 | Refills: 3 | Status: ACTIVE | COMMUNITY
Start: 2025-04-01 | End: 1900-01-01

## 2025-04-01 RX ORDER — MOMETASONE 50 UG/1
50 SPRAY, METERED NASAL DAILY
Qty: 1 | Refills: 3 | Status: ACTIVE | COMMUNITY
Start: 2025-04-01 | End: 1900-01-01

## 2025-04-02 ENCOUNTER — TRANSCRIPTION ENCOUNTER (OUTPATIENT)
Age: 30
End: 2025-04-02

## 2025-05-27 ENCOUNTER — APPOINTMENT (OUTPATIENT)
Dept: PULMONOLOGY | Facility: CLINIC | Age: 30
End: 2025-05-27
Payer: COMMERCIAL

## 2025-05-27 VITALS
HEIGHT: 70 IN | TEMPERATURE: 97.2 F | HEART RATE: 71 BPM | WEIGHT: 234 LBS | DIASTOLIC BLOOD PRESSURE: 84 MMHG | BODY MASS INDEX: 33.5 KG/M2 | RESPIRATION RATE: 16 BRPM | SYSTOLIC BLOOD PRESSURE: 124 MMHG | OXYGEN SATURATION: 98 %

## 2025-05-27 DIAGNOSIS — R94.2 ABNORMAL RESULTS OF PULMONARY FUNCTION STUDIES: ICD-10-CM

## 2025-05-27 DIAGNOSIS — J30.9 ALLERGIC RHINITIS, UNSPECIFIED: ICD-10-CM

## 2025-05-27 DIAGNOSIS — R06.02 SHORTNESS OF BREATH: ICD-10-CM

## 2025-05-27 DIAGNOSIS — K21.9 GASTRO-ESOPHAGEAL REFLUX DISEASE W/OUT ESOPHAGITIS: ICD-10-CM

## 2025-05-27 DIAGNOSIS — G47.33 OBSTRUCTIVE SLEEP APNEA (ADULT) (PEDIATRIC): ICD-10-CM

## 2025-05-27 PROCEDURE — 95012 NITRIC OXIDE EXP GAS DETER: CPT

## 2025-05-27 PROCEDURE — 99214 OFFICE O/P EST MOD 30 MIN: CPT | Mod: 25

## 2025-05-27 PROCEDURE — 94010 BREATHING CAPACITY TEST: CPT

## 2025-06-02 ENCOUNTER — APPOINTMENT (OUTPATIENT)
Dept: FAMILY MEDICINE | Facility: CLINIC | Age: 30
End: 2025-06-02
Payer: COMMERCIAL

## 2025-06-02 VITALS
OXYGEN SATURATION: 97 % | HEIGHT: 70 IN | WEIGHT: 228 LBS | SYSTOLIC BLOOD PRESSURE: 115 MMHG | BODY MASS INDEX: 32.64 KG/M2 | RESPIRATION RATE: 16 BRPM | TEMPERATURE: 97.5 F | HEART RATE: 75 BPM | DIASTOLIC BLOOD PRESSURE: 79 MMHG

## 2025-06-02 DIAGNOSIS — Z00.00 ENCOUNTER FOR GENERAL ADULT MEDICAL EXAMINATION W/OUT ABNORMAL FINDINGS: ICD-10-CM

## 2025-06-02 DIAGNOSIS — J45.909 UNSPECIFIED ASTHMA, UNCOMPLICATED: ICD-10-CM

## 2025-06-02 DIAGNOSIS — R53.82 CHRONIC FATIGUE, UNSPECIFIED: ICD-10-CM

## 2025-06-02 DIAGNOSIS — R53.81 CHRONIC FATIGUE, UNSPECIFIED: ICD-10-CM

## 2025-06-02 LAB
25(OH)D3 SERPL-MCNC: 24.9 NG/ML
ANION GAP SERPL CALC-SCNC: 12 MMOL/L
BUN SERPL-MCNC: 12 MG/DL
CALCIUM SERPL-MCNC: 9.1 MG/DL
CHLORIDE SERPL-SCNC: 104 MMOL/L
CHOLEST SERPL-MCNC: 153 MG/DL
CO2 SERPL-SCNC: 22 MMOL/L
CREAT SERPL-MCNC: 0.74 MG/DL
EGFRCR SERPLBLD CKD-EPI 2021: 124 ML/MIN/1.73M2
ESTIMATED AVERAGE GLUCOSE: 91 MG/DL
GLUCOSE SERPL-MCNC: 100 MG/DL
HBA1C MFR BLD HPLC: 4.8 %
HCT VFR BLD CALC: 46.4 %
HDLC SERPL-MCNC: 51 MG/DL
HGB BLD-MCNC: 14.4 G/DL
LDLC SERPL-MCNC: 85 MG/DL
MCHC RBC-ENTMCNC: 28.7 PG
MCHC RBC-ENTMCNC: 31 G/DL
MCV RBC AUTO: 92.6 FL
NONHDLC SERPL-MCNC: 101 MG/DL
PLATELET # BLD AUTO: 219 K/UL
POTASSIUM SERPL-SCNC: 4.5 MMOL/L
RBC # BLD: 5.01 M/UL
RBC # FLD: 13 %
SODIUM SERPL-SCNC: 138 MMOL/L
TRIGL SERPL-MCNC: 86 MG/DL
TSH SERPL-ACNC: 1.06 UIU/ML
WBC # FLD AUTO: 5.13 K/UL

## 2025-06-02 PROCEDURE — 99395 PREV VISIT EST AGE 18-39: CPT

## 2025-06-02 PROCEDURE — 36415 COLL VENOUS BLD VENIPUNCTURE: CPT

## 2025-06-30 ENCOUNTER — APPOINTMENT (OUTPATIENT)
Dept: MRI IMAGING | Facility: CLINIC | Age: 30
End: 2025-06-30
Payer: COMMERCIAL

## 2025-06-30 ENCOUNTER — APPOINTMENT (OUTPATIENT)
Dept: ORTHOPEDIC SURGERY | Facility: CLINIC | Age: 30
End: 2025-06-30
Payer: COMMERCIAL

## 2025-06-30 PROCEDURE — 99214 OFFICE O/P EST MOD 30 MIN: CPT

## 2025-06-30 PROCEDURE — 73721 MRI JNT OF LWR EXTRE W/O DYE: CPT | Mod: LT

## 2025-06-30 PROCEDURE — 73564 X-RAY EXAM KNEE 4 OR MORE: CPT | Mod: LT

## 2025-06-30 RX ORDER — NAPROXEN 500 MG/1
500 TABLET ORAL TWICE DAILY
Qty: 30 | Refills: 0 | Status: ACTIVE | COMMUNITY
Start: 2025-06-30 | End: 1900-01-01

## 2025-07-01 ENCOUNTER — TRANSCRIPTION ENCOUNTER (OUTPATIENT)
Age: 30
End: 2025-07-01

## 2025-07-02 ENCOUNTER — APPOINTMENT (OUTPATIENT)
Dept: ENDOCRINOLOGY | Facility: CLINIC | Age: 30
End: 2025-07-02

## 2025-07-02 ENCOUNTER — NON-APPOINTMENT (OUTPATIENT)
Age: 30
End: 2025-07-02

## 2025-07-02 ENCOUNTER — APPOINTMENT (OUTPATIENT)
Dept: ORTHOPEDIC SURGERY | Facility: CLINIC | Age: 30
End: 2025-07-02
Payer: COMMERCIAL

## 2025-07-02 VITALS — BODY MASS INDEX: 32.64 KG/M2 | WEIGHT: 228 LBS | HEIGHT: 70 IN

## 2025-07-02 PROBLEM — M71.22 SYNOVIAL CYST OF LEFT KNEE: Status: ACTIVE | Noted: 2025-07-02

## 2025-07-02 PROBLEM — M65.969 SYNOVITIS OF KNEE: Status: ACTIVE | Noted: 2023-12-20

## 2025-07-02 PROCEDURE — 99214 OFFICE O/P EST MOD 30 MIN: CPT

## 2025-07-03 ENCOUNTER — APPOINTMENT (OUTPATIENT)
Dept: ORTHOPEDIC SURGERY | Facility: CLINIC | Age: 30
End: 2025-07-03
Payer: COMMERCIAL

## 2025-07-07 ENCOUNTER — NON-APPOINTMENT (OUTPATIENT)
Age: 30
End: 2025-07-07

## 2025-07-09 ENCOUNTER — TRANSCRIPTION ENCOUNTER (OUTPATIENT)
Age: 30
End: 2025-07-09

## 2025-07-10 RX ORDER — PROMETHAZINE HYDROCHLORIDE 12.5 MG/1
12.5 TABLET ORAL EVERY 6 HOURS
Qty: 20 | Refills: 0 | Status: ACTIVE | COMMUNITY
Start: 2025-07-10 | End: 1900-01-01

## 2025-07-10 RX ORDER — ACETAMINOPHEN 500 MG/1
500 TABLET ORAL 3 TIMES DAILY
Qty: 90 | Refills: 0 | Status: ACTIVE | COMMUNITY
Start: 2025-07-10 | End: 1900-01-01

## 2025-07-10 RX ORDER — OXYCODONE 5 MG/1
5 TABLET ORAL
Qty: 10 | Refills: 0 | Status: ACTIVE | COMMUNITY
Start: 2025-07-10 | End: 1900-01-01

## 2025-07-10 RX ORDER — IBUPROFEN 600 MG/1
600 TABLET, FILM COATED ORAL EVERY 6 HOURS
Qty: 20 | Refills: 0 | Status: ACTIVE | COMMUNITY
Start: 2025-07-10 | End: 1900-01-01

## 2025-07-11 ENCOUNTER — APPOINTMENT (OUTPATIENT)
Age: 30
End: 2025-07-11
Payer: COMMERCIAL

## 2025-07-11 PROCEDURE — 29870 ARTHRS KNEE DX W/WO SYN BX: CPT | Mod: LT

## 2025-07-11 PROCEDURE — 29882 ARTHRS KNE SRG MNISC RPR M/L: CPT | Mod: LT

## 2025-07-11 PROCEDURE — 29870 ARTHRS KNEE DX W/WO SYN BX: CPT | Mod: AS,LT

## 2025-07-11 PROCEDURE — 29879 ARTHRS KNE SRG ABRASJ ARTHRP: CPT | Mod: AS,LT

## 2025-07-11 PROCEDURE — 29879 ARTHRS KNE SRG ABRASJ ARTHRP: CPT | Mod: LT

## 2025-07-11 PROCEDURE — 29882 ARTHRS KNE SRG MNISC RPR M/L: CPT | Mod: AS,LT

## 2025-07-16 PROBLEM — S83.272D COMPLEX TEAR OF LATERAL MENISCUS OF LEFT KNEE, SUBSEQUENT ENCOUNTER: Status: ACTIVE | Noted: 2025-07-16

## 2025-07-16 PROBLEM — S83.32XD TEAR OF ARTICULAR CARTILAGE OF LEFT KNEE AS CURRENT INJURY, SUBSEQUENT ENCOUNTER: Status: ACTIVE | Noted: 2025-07-16

## 2025-07-16 PROBLEM — S83.272A COMPLEX TEAR OF LATERAL MENISCUS OF LEFT KNEE AS CURRENT INJURY, INITIAL ENCOUNTER: Status: RESOLVED | Noted: 2025-07-02 | Resolved: 2025-07-16

## 2025-07-17 ENCOUNTER — APPOINTMENT (OUTPATIENT)
Dept: ORTHOPEDIC SURGERY | Facility: CLINIC | Age: 30
End: 2025-07-17
Payer: COMMERCIAL

## 2025-07-17 PROCEDURE — 99024 POSTOP FOLLOW-UP VISIT: CPT

## 2025-07-21 ENCOUNTER — NON-APPOINTMENT (OUTPATIENT)
Age: 30
End: 2025-07-21

## 2025-08-15 ENCOUNTER — NON-APPOINTMENT (OUTPATIENT)
Age: 30
End: 2025-08-15

## 2025-08-27 ENCOUNTER — TRANSCRIPTION ENCOUNTER (OUTPATIENT)
Age: 30
End: 2025-08-27

## 2025-09-01 ENCOUNTER — RX RENEWAL (OUTPATIENT)
Age: 30
End: 2025-09-01

## 2025-09-03 ENCOUNTER — APPOINTMENT (OUTPATIENT)
Dept: ORTHOPEDIC SURGERY | Facility: CLINIC | Age: 30
End: 2025-09-03
Payer: COMMERCIAL

## 2025-09-03 VITALS — BODY MASS INDEX: 32.64 KG/M2 | WEIGHT: 228 LBS | HEIGHT: 70 IN

## 2025-09-03 DIAGNOSIS — S83.32XD TEAR OF ARTICULAR CARTILAGE OF LEFT KNEE, CURRENT, SUBSEQUENT ENCOUNTER: ICD-10-CM

## 2025-09-03 DIAGNOSIS — S83.272D COMPLEX TEAR OF LATERAL MENISCUS, CURRENT INJURY, LEFT KNEE, SUBSEQUENT ENCOUNTER: ICD-10-CM

## 2025-09-03 PROCEDURE — 99024 POSTOP FOLLOW-UP VISIT: CPT

## (undated) DEVICE — SUT VICRYL 0 27" CP-1 UNDYED

## (undated) DEVICE — WARMING BLANKET LOWER ADULT

## (undated) DEVICE — SUT VICRYL 2-0 27" CT-2 UNDYED

## (undated) DEVICE — GLV 8 PROTEXIS (BLUE)

## (undated) DEVICE — FOLEY TRAY 16FR 5CC LF UMETER CLOSED

## (undated) DEVICE — SOL IRR POUR H2O 1000ML

## (undated) DEVICE — DRILL BIT SYNTHES ORTHO QC 2.5X110MM

## (undated) DEVICE — SOL IRR POUR NS 0.9% 1000ML

## (undated) DEVICE — VENODYNE/SCD SLEEVE CALF MEDIUM

## (undated) DEVICE — SUT NDL MAYO CATGUT 1/2 CIRCLE TROCAR POINT 0.050" X 1.521"

## (undated) DEVICE — SUT ORTHOCORD 2 36" MO-7

## (undated) DEVICE — GLV 7.5 PROTEXIS (WHITE)

## (undated) DEVICE — Device

## (undated) DEVICE — DRAPE XL SHEET 77X98"

## (undated) DEVICE — PACK EXTREMITY

## (undated) DEVICE — FRAZIER SUCTION TIP 10FR

## (undated) DEVICE — DRAPE C ARM UNIVERSAL

## (undated) DEVICE — DRAPE TOWEL BLUE 17" X 24"

## (undated) DEVICE — DRAPE SPLIT SHEET 77" X 108"

## (undated) DEVICE — DRAPE IOBAN 33" X 23"